# Patient Record
Sex: MALE | Race: BLACK OR AFRICAN AMERICAN | NOT HISPANIC OR LATINO | Employment: FULL TIME | URBAN - METROPOLITAN AREA
[De-identification: names, ages, dates, MRNs, and addresses within clinical notes are randomized per-mention and may not be internally consistent; named-entity substitution may affect disease eponyms.]

---

## 2017-03-13 ENCOUNTER — ALLSCRIPTS OFFICE VISIT (OUTPATIENT)
Dept: OTHER | Facility: OTHER | Age: 52
End: 2017-03-13

## 2017-05-22 ENCOUNTER — ALLSCRIPTS OFFICE VISIT (OUTPATIENT)
Dept: OTHER | Facility: OTHER | Age: 52
End: 2017-05-22

## 2017-05-23 ENCOUNTER — LAB CONVERSION - ENCOUNTER (OUTPATIENT)
Dept: OTHER | Facility: OTHER | Age: 52
End: 2017-05-23

## 2017-05-23 ENCOUNTER — GENERIC CONVERSION - ENCOUNTER (OUTPATIENT)
Dept: OTHER | Facility: OTHER | Age: 52
End: 2017-05-23

## 2017-05-23 LAB
A/G RATIO (HISTORICAL): 1.2 (CALC) (ref 1–2.5)
ALBUMIN SERPL BCP-MCNC: 4.1 G/DL (ref 3.6–5.1)
ALP SERPL-CCNC: 108 U/L (ref 40–115)
ALT SERPL W P-5'-P-CCNC: 23 U/L (ref 9–46)
AST SERPL W P-5'-P-CCNC: 20 U/L (ref 10–35)
BILIRUB SERPL-MCNC: 0.4 MG/DL (ref 0.2–1.2)
BUN SERPL-MCNC: 10 MG/DL (ref 7–25)
BUN/CREA RATIO (HISTORICAL): ABNORMAL (CALC) (ref 6–22)
CALCIUM SERPL-MCNC: 9.4 MG/DL (ref 8.6–10.3)
CHLORIDE SERPL-SCNC: 102 MMOL/L (ref 98–110)
CHOLEST SERPL-MCNC: 206 MG/DL (ref 125–200)
CHOLEST/HDLC SERPL: 5 (CALC)
CO2 SERPL-SCNC: 24 MMOL/L (ref 20–31)
CREAT SERPL-MCNC: 0.91 MG/DL (ref 0.7–1.33)
EGFR AFRICAN AMERICAN (HISTORICAL): 111 ML/MIN/1.73M2
EGFR-AMERICAN CALC (HISTORICAL): 96 ML/MIN/1.73M2
GAMMA GLOBULIN (HISTORICAL): 3.3 G/DL (CALC) (ref 1.9–3.7)
GLUCOSE (HISTORICAL): 261 MG/DL (ref 65–99)
HBA1C MFR BLD HPLC: 12.4 % OF TOTAL HGB
HDLC SERPL-MCNC: 41 MG/DL
LDL CHOLESTEROL (HISTORICAL): 124 MG/DL (CALC)
NON-HDL-CHOL (CHOL-HDL) (HISTORICAL): 165 MG/DL (CALC)
POTASSIUM SERPL-SCNC: 4.2 MMOL/L (ref 3.5–5.3)
PROSTATE SPECIFIC ANTIGEN TOTAL (HISTORICAL): 1 NG/ML
SODIUM SERPL-SCNC: 136 MMOL/L (ref 135–146)
TOTAL PROTEIN (HISTORICAL): 7.4 G/DL (ref 6.1–8.1)
TRIGL SERPL-MCNC: 207 MG/DL
TSH SERPL DL<=0.05 MIU/L-ACNC: 1.36 MIU/L (ref 0.4–4.5)

## 2017-06-12 ENCOUNTER — ALLSCRIPTS OFFICE VISIT (OUTPATIENT)
Dept: OTHER | Facility: OTHER | Age: 52
End: 2017-06-12

## 2017-06-13 LAB — MICROALBUM.,U,RANDOM (HISTORICAL): 10.3 UG/ML

## 2017-08-21 ENCOUNTER — ALLSCRIPTS OFFICE VISIT (OUTPATIENT)
Dept: OTHER | Facility: OTHER | Age: 52
End: 2017-08-21

## 2017-08-21 LAB
BILIRUB UR QL STRIP: NEGATIVE
CLARITY UR: NORMAL
COLOR UR: YELLOW
GLUCOSE (HISTORICAL): >1000
HBA1C MFR BLD HPLC: 9.7 %
HGB UR QL STRIP.AUTO: NEGATIVE
KETONES UR STRIP-MCNC: NEGATIVE MG/DL
LEUKOCYTE ESTERASE UR QL STRIP: NEGATIVE
NITRITE UR QL STRIP: NEGATIVE
PH UR STRIP.AUTO: 7 [PH]
PROT UR STRIP-MCNC: NEGATIVE MG/DL
SP GR UR STRIP.AUTO: 1.01
UROBILINOGEN UR QL STRIP.AUTO: NORMAL

## 2017-08-24 ENCOUNTER — LAB CONVERSION - ENCOUNTER (OUTPATIENT)
Dept: OTHER | Facility: OTHER | Age: 52
End: 2017-08-24

## 2017-08-24 LAB
CREATININE, RANDOM URINE (HISTORICAL): 74 MG/DL (ref 20–370)
MAGNESIUM, UR (HISTORICAL): 1.3 MG/DL
MICROALBUMIN/CREATININE RATIO (HISTORICAL): 18 MCG/MG CREAT

## 2017-09-27 ENCOUNTER — GENERIC CONVERSION - ENCOUNTER (OUTPATIENT)
Dept: OTHER | Facility: OTHER | Age: 52
End: 2017-09-27

## 2017-10-12 ENCOUNTER — GENERIC CONVERSION - ENCOUNTER (OUTPATIENT)
Dept: OTHER | Facility: OTHER | Age: 52
End: 2017-10-12

## 2017-11-17 ENCOUNTER — ALLSCRIPTS OFFICE VISIT (OUTPATIENT)
Dept: OTHER | Facility: OTHER | Age: 52
End: 2017-11-17

## 2017-11-18 NOTE — PROGRESS NOTES
Assessment    1  Type 2 diabetes mellitus without complication, without long-term current use of insulin (250 00) (E11 9)   2  Hypertension (401 9) (I10)   3  Mild intermittent asthma without complication (156 47) (L01 78)    Plan  Hypertension    · A diet low in sodium and high in potassium, magnesium, and calcium can help yourblood pressure ; Status:Complete;   Done: [id number]   · Eat a low fat and low cholesterol diet ; Status:Complete;   Done: 40SZB2864   · Call [phone] if: You become dizzy or lightheaded, especially when you stand upafter sitting for a while ; Status:Complete;   Done: 55VWN8956  Type 2 diabetes mellitus without complication, without long-term current use of insulin    · Follow-up visit in 3 months Evaluation and Treatment  Follow-up  Status: Hold For -Scheduling  Requested for: 11BPW1066   · Begin a limited exercise program ; Status:Complete;   Done: 70XRO1609   · Continue with our present treatment plan ; Status:Complete;   Done: 50MKS4081   · Eat a normal well-balanced diet ; Status:Complete;   Done: 79EMN8440   · Have your eyes examined by an eye doctor every year ; Status:Complete;   Done:17Nov2017   · Inspect your feet daily ; Status:Complete;   Done: 24VEX6843   · It is important to take good care of your feet if you have diabetes ; Status:Complete;  Done: 55DWZ1160   · Restrict the salt in your diet by avoiding highly salted foods ; Status:Complete;   Done:17Nov2017   · We recommend that you bring your body mass index down to 26 ; Status:Complete;  Done: 22IES9531   · You need to quit smoking ; Status:Complete;   Done: 92ECF7785   · *VB - Eye Exam; Status:Active; Requested for:17Nov2017;     Discussion/Summary    CONTINUE CURRENT TREATMENT PLANTO MONITOR CARBOHYDRATE INTAKE AND CONTINUE WEIGHT LOSS - BE MORE AGGRESSIVEMETFORMIN TO 1000 MG DAILY3 M  The treatment plan was reviewed with the patient/guardian   The patient/guardian understands and agrees with the treatment plan Chief Complaint  pt present to follow up on DM  af/rma      History of Present Illness  PATIENT RETURNS FOR RE EVALUATION OF MEDICAL ISSUES  INTAKEINCREASED ACTIVITYA LOT WITH GENA NUMBNESS, TINGLING OR WEAKNESS  [address] [facility]  USE OF INHALER      Review of Systems   Constitutional: no fever,-- no chills-- and-- not feeling tired  Eyes: no eyesight problems  ENT: no nasal discharge  Cardiovascular: no chest pain-- and-- no extremity edema  Respiratory: no shortness of breath,-- no cough,-- no wheezing-- and-- no shortness of breath during exertion  Gastrointestinal: no abdominal pain,-- no nausea,-- no vomiting-- and-- no diarrhea  Genitourinary: no dysuria  Musculoskeletal: no arthralgias-- and-- no joint stiffness  Integumentary: no rashes  Neurological: no headache,-- no numbness,-- no tingling-- and-- no dizziness  Psychiatric: no anxiety  Endocrine: no muscle weakness  Hematologic/Lymphatic: no swollen glands  ROS reviewed  Active Problems    1  Hypertension (401 9) (I10)   2  Mild intermittent asthma without complication (620 85) (O16 61)   3  Trigger ring finger of right hand (727 03) (M65 341)   4  Type 2 diabetes mellitus without complication, without long-term current use of insulin (250 00) (E11 9)    Past Medical History  1  Flu vaccine need (V04 81) (Z23)    The active problems and past medical history were reviewed and updated today  Surgical History    The surgical history was reviewed and updated today  Family History  Mother    1  Family history of malignant neoplasm of breast (V16 3) (Z80 3)    The family history was reviewed and updated today  Social History     · Current some day smoker (305 1) (F17 200)   · Former smoker (V15 82) (B34 543)   · No alcohol use  The social history was reviewed and updated today  Current Meds   1   Albuterol 90 MCG/ACT AERS; FOR RESCUE WHEN OTHER MEDS NOT WORKING,USE 2-3 PUFFS 3-4 TIMES DAILY MAX EVERY 4 HOURS; Therapy: (Recorded:12Jun2017) to Recorded   2  AmLODIPine Besylate 10 MG Oral Tablet; TAKE 1 TABLET DAILY AS DIRECTED  Requested for: 47YXW9489; Last Rx:09Nov2017 Ordered   3  Asmanex  MCG/ACT Inhalation Aerosol; inhale one puff in the morning and one puff at night; Therapy: (Recorded:12Jun2017) to Recorded   4  Farxiga 10 MG Oral Tablet; Take 1 tablet daily; Therapy: 62FLJ6960 to (Last Rx:12Jun2017) Ordered   5  MetFORMIN HCl  MG Oral Tablet Extended Release 24 Hour; Therapy: (Recorded:13Mar2017) to Recorded   6  Vitamin D-3 5000 UNIT Oral Tablet; Therapy: (Recorded:13Mar2017) to Recorded    The medication list was reviewed and updated today  Allergies    1  No Known Drug Allergies    Physical Exam   Constitutional  General appearance: No acute distress, well appearing and well nourished  Eyes  Conjunctiva and lids: No swelling, erythema, or discharge  Pupils and irises: Equal, round and reactive to light  Ears, Nose, Mouth, and Throat  External inspection of ears and nose: Normal    Pulmonary  Respiratory effort: No increased work of breathing or signs of respiratory distress  Auscultation of lungs: Clear to auscultation, equal breath sounds bilaterally, no wheezes, no rales, no rhonci  Cardiovascular  Auscultation of heart: Normal rate and rhythm, normal S1 and S2, without murmurs  Examination of extremities for edema and/or varicosities: Normal    Abdomen  Abdomen: Abnormal  -- MILDLY OBESE  Liver and spleen: No hepatomegaly or splenomegaly  Lymphatic  Palpation of lymph nodes in neck: No lymphadenopathy  Musculoskeletal  Gait and station: Normal    Digits and nails: Normal without clubbing or cyanosis  Inspection/palpation of joints, bones, and muscles: Normal    Skin  Skin and subcutaneous tissue: Normal without rashes or lesions  Neurologic  Reflexes: 2+ and symmetric  Sensation: No sensory loss     Psychiatric  Orientation to person, place and time: Normal  Mood and affect: Normal          Signatures   Electronically signed by : Ashley Benjamin MD; Nov 17 2017  1:09PM EST                       (Author)

## 2017-12-08 ENCOUNTER — GENERIC CONVERSION - ENCOUNTER (OUTPATIENT)
Dept: OTHER | Facility: OTHER | Age: 52
End: 2017-12-08

## 2018-01-13 VITALS
HEART RATE: 82 BPM | WEIGHT: 222 LBS | TEMPERATURE: 98.6 F | RESPIRATION RATE: 20 BRPM | DIASTOLIC BLOOD PRESSURE: 80 MMHG | OXYGEN SATURATION: 95 % | SYSTOLIC BLOOD PRESSURE: 120 MMHG | BODY MASS INDEX: 34.84 KG/M2 | HEIGHT: 67 IN

## 2018-01-13 VITALS
TEMPERATURE: 97.3 F | BODY MASS INDEX: 34.37 KG/M2 | RESPIRATION RATE: 18 BRPM | HEIGHT: 67 IN | HEART RATE: 80 BPM | WEIGHT: 219 LBS | DIASTOLIC BLOOD PRESSURE: 82 MMHG | SYSTOLIC BLOOD PRESSURE: 124 MMHG

## 2018-01-13 VITALS
TEMPERATURE: 98.8 F | WEIGHT: 220 LBS | HEIGHT: 67 IN | HEART RATE: 82 BPM | OXYGEN SATURATION: 96 % | DIASTOLIC BLOOD PRESSURE: 70 MMHG | RESPIRATION RATE: 20 BRPM | SYSTOLIC BLOOD PRESSURE: 124 MMHG | BODY MASS INDEX: 34.53 KG/M2

## 2018-01-14 VITALS
DIASTOLIC BLOOD PRESSURE: 64 MMHG | OXYGEN SATURATION: 98 % | RESPIRATION RATE: 20 BRPM | HEART RATE: 85 BPM | TEMPERATURE: 99 F | SYSTOLIC BLOOD PRESSURE: 128 MMHG

## 2018-01-14 VITALS
TEMPERATURE: 98.1 F | OXYGEN SATURATION: 97 % | SYSTOLIC BLOOD PRESSURE: 132 MMHG | DIASTOLIC BLOOD PRESSURE: 90 MMHG | WEIGHT: 223 LBS | HEART RATE: 80 BPM | BODY MASS INDEX: 35 KG/M2 | HEIGHT: 67 IN

## 2018-01-15 NOTE — RESULT NOTES
Verified Results  (1) COMPREHENSIVE METABOLIC PANEL [id number] 95:28YX Roxine Oscar     Test Name Result Flag Reference   GLUCOSE 261 mg/dL H 65-99   Fasting reference interval     For someone without known diabetes, a glucose  value >125 mg/dL indicates that they may have  diabetes and this should be confirmed with a  follow-up test    UREA NITROGEN (BUN) 10 mg/dL  7-25   CREATININE 0 91 mg/dL  0 70-1 33   For patients >52years of age, the reference limit  for Creatinine is approximately 13% higher for people  identified as -American  eGFR NON-AFR  AMERICAN 96 mL/min/1 73m2  > OR = 60   eGFR AFRICAN AMERICAN 111 mL/min/1 73m2  > OR = 60   BUN/CREATININE RATIO   0-40   NOT APPLICABLE (calc)   SODIUM 136 mmol/L  135-146   POTASSIUM 4 2 mmol/L  3 5-5 3   CHLORIDE 102 mmol/L     CARBON DIOXIDE 24 mmol/L  20-31   CALCIUM 9 4 mg/dL  8 6-10 3   PROTEIN, TOTAL 7 4 g/dL  6 1-8 1   ALBUMIN 4 1 g/dL  3 6-5 1   GLOBULIN 3 3 g/dL (calc)  1 9-3 7   ALBUMIN/GLOBULIN RATIO 1 2 (calc)  1 0-2 5   BILIRUBIN, TOTAL 0 4 mg/dL  0 2-1 2   ALKALINE PHOSPHATASE 108 U/L     AST 20 U/L  10-35   ALT 23 U/L  9-46     (1) LIPID PANEL, FASTING [id number] 12:00AM Roxine Oscar     Test Name Result Flag Reference   CHOLESTEROL, TOTAL 206 mg/dL H 125-200   HDL CHOLESTEROL 41 mg/dL  > OR = 40   TRIGLICERIDES 755 mg/dL H <150   LDL-CHOLESTEROL 124 mg/dL (calc)  <130   Desirable range <100 mg/dL for patients with CHD or  diabetes and <70 mg/dL for diabetic patients with  known heart disease  CHOL/HDLC RATIO 5 0 (calc)  < OR = 5 0   NON HDL CHOLESTEROL 165 mg/dL (calc) H    Target for non-HDL cholesterol is 30 mg/dL higher than   LDL cholesterol target  (1) PSA (SCREEN) (Dx V76 44 Screen for Prostate Cancer) [id number] 12:00AM Roxine Oscar     Test Name Result Flag Reference   PSA, TOTAL 1 0 ng/mL  < OR = 4 0   This test was performed using the Siemens  chemiluminescent method   Values obtained from  different assay methods cannot be used  interchangeably  PSA levels, regardless of  value, should not be interpreted as absolute  evidence of the presence or absence of disease  (Q) CBC (H/H, RBC, INDICES, WBC, PLT) [id number] 12:00AM [employer]     Test Name Result Flag Reference   WHITE BLOOD CELL COUNT 5 1 Thousand/uL  3 8-10 8   RED BLOOD CELL COUNT 6 21 Million/uL H 4 20-5 80   HEMOGLOBIN 15 0 g/dL  13 2-17 1   HEMATOCRIT 46 6 %  38 5-50 0   MCV 75 1 fL L 80 0-100 0   MCH 24 1 pg L 27 0-33 0   MCHC 32 1 g/dL  32 0-36 0   RDW 18 4 % H 11 0-15 0   PLATELET COUNT 655 Thousand/uL  140-400   WE RECEIVED YOUR HANDWRITTEN TEST ORDER AND  PERFORMED A HEMOGRAM WITH A PLATELET WITHOUT  A DIFFERENTIAL  IF THIS IS NOT WHAT YOU INTENDED  TO ORDER, PLEASE CONTACT YOUR LOCAL CLIENT SERVICE  REPRESENTATIVE IMMEDIATELY SO THAT WE CAN   ADJUST OUR BILLING APPROPRIATELY  YOU MAY ALSO   INQUIRE ABOUT ALTERNATIVE OR ADDITIONAL TESTING  MPV 8 7 fL  7 5-12 5           (Q) TSH, 3RD GENERATION [id number] 12:00AM [facility] [facility]     Test Name Result Flag Reference   TSH 1 36 mIU/L  0 40-4 50     (Q) HEMOGLOBIN A1c [id number] 12:00AM [facility] [facility]     Test Name Result Flag Reference   HEMOGLOBIN A1c 12 4 % of total Hgb H <5 7   For someone without known diabetes, a hemoglobin A1c  value of 6 5% or greater indicates that they may have   diabetes and this should be confirmed with a follow-up   test      For someone with known diabetes, a value <7% indicates   that their diabetes is well controlled and a value   greater than or equal to 7% indicates suboptimal   control  A1c targets should be individualized based on   duration of diabetes, age, comorbid conditions, and   other considerations  Currently, no consensus exists regarding use of  hemoglobin A1c for diagnosis of diabetes for children

## 2018-01-22 VITALS
SYSTOLIC BLOOD PRESSURE: 128 MMHG | OXYGEN SATURATION: 96 % | BODY MASS INDEX: 35 KG/M2 | RESPIRATION RATE: 16 BRPM | DIASTOLIC BLOOD PRESSURE: 80 MMHG | TEMPERATURE: 97.9 F | WEIGHT: 223 LBS | HEART RATE: 78 BPM | HEIGHT: 67 IN

## 2018-01-24 VITALS
HEART RATE: 88 BPM | SYSTOLIC BLOOD PRESSURE: 130 MMHG | RESPIRATION RATE: 20 BRPM | BODY MASS INDEX: 35 KG/M2 | WEIGHT: 223 LBS | DIASTOLIC BLOOD PRESSURE: 100 MMHG | OXYGEN SATURATION: 93 % | HEIGHT: 67 IN | TEMPERATURE: 99 F

## 2018-02-23 ENCOUNTER — OFFICE VISIT (OUTPATIENT)
Dept: FAMILY MEDICINE CLINIC | Facility: CLINIC | Age: 53
End: 2018-02-23
Payer: COMMERCIAL

## 2018-02-23 VITALS
BODY MASS INDEX: 34.25 KG/M2 | RESPIRATION RATE: 18 BRPM | WEIGHT: 218.2 LBS | HEIGHT: 67 IN | DIASTOLIC BLOOD PRESSURE: 98 MMHG | TEMPERATURE: 98.5 F | SYSTOLIC BLOOD PRESSURE: 148 MMHG | OXYGEN SATURATION: 95 % | HEART RATE: 97 BPM

## 2018-02-23 DIAGNOSIS — R13.10 DYSPHAGIA, UNSPECIFIED TYPE: ICD-10-CM

## 2018-02-23 DIAGNOSIS — J06.9 UPPER RESPIRATORY TRACT INFECTION, UNSPECIFIED TYPE: Primary | ICD-10-CM

## 2018-02-23 DIAGNOSIS — J02.9 SORE THROAT: ICD-10-CM

## 2018-02-23 PROBLEM — M65.341 TRIGGER RING FINGER OF RIGHT HAND: Status: ACTIVE | Noted: 2017-09-27

## 2018-02-23 PROBLEM — E11.9 TYPE 2 DIABETES MELLITUS WITHOUT COMPLICATION, WITHOUT LONG-TERM CURRENT USE OF INSULIN (HCC): Status: ACTIVE | Noted: 2017-03-13

## 2018-02-23 PROBLEM — J45.20 MILD INTERMITTENT ASTHMA WITHOUT COMPLICATION: Status: ACTIVE | Noted: 2017-03-13

## 2018-02-23 PROBLEM — I10 HYPERTENSION: Status: ACTIVE | Noted: 2017-03-13

## 2018-02-23 LAB — S PYO AG THROAT QL: NEGATIVE

## 2018-02-23 PROCEDURE — 99213 OFFICE O/P EST LOW 20 MIN: CPT | Performed by: NURSE PRACTITIONER

## 2018-02-23 PROCEDURE — 87880 STREP A ASSAY W/OPTIC: CPT | Performed by: NURSE PRACTITIONER

## 2018-02-23 RX ORDER — PANTOPRAZOLE SODIUM 40 MG/1
1 TABLET, DELAYED RELEASE ORAL DAILY
COMMUNITY
Start: 2018-01-03

## 2018-02-23 RX ORDER — ALBUTEROL SULFATE 90 UG/1
3 AEROSOL, METERED RESPIRATORY (INHALATION) 4 TIMES DAILY
COMMUNITY
End: 2019-03-30 | Stop reason: SDUPTHER

## 2018-02-23 RX ORDER — AMLODIPINE BESYLATE 10 MG/1
1 TABLET ORAL DAILY
COMMUNITY
End: 2018-11-19 | Stop reason: SDUPTHER

## 2018-02-23 RX ORDER — METFORMIN HYDROCHLORIDE 500 MG/1
1 TABLET, EXTENDED RELEASE ORAL 2 TIMES DAILY
COMMUNITY
End: 2018-05-15 | Stop reason: SDUPTHER

## 2018-02-23 NOTE — PATIENT INSTRUCTIONS
Fluids  Rest  Symptomatic care for sore throat symptoms   Return to office in one week if symptoms persist or worsen

## 2018-02-23 NOTE — PROGRESS NOTES
Assessment/Plan:    Upper respiratory tract infection  - pt states his daughter was diagnosed with strep throat   - symptoms began this morning      Diagnoses and all orders for this visit:    Upper respiratory tract infection, unspecified type  -     POCT rapid strepA - NEGATIVE  -  REST, FLUIDS AND SYMPTOMATIC CARE    Dysphagia, unspecified type    Other orders  -     albuterol (PROVENTIL HFA,VENTOLIN HFA) 90 mcg/act inhaler; Inhale 3 puffs 4 times daily  -     amLODIPine (NORVASC) 10 mg tablet; Take 1 tablet by mouth daily  -     Mometasone Furoate (ASMANEX HFA) 100 MCG/ACT AERO; Inhale 1 puff 2 (two) times a day  -     Dapagliflozin Propanediol (FARXIGA) 10 MG TABS; Take 1 tablet by mouth daily  -     metFORMIN (GLUCOPHAGE-XR) 500 mg 24 hr tablet; Take 1 tablet by mouth 2 (two) times a day  -     Cholecalciferol (VITAMIN D-3) 5000 units TABS; Take 1 tablet by mouth daily  -     pantoprazole (PROTONIX) 40 mg tablet; Take 1 tablet by mouth daily    Subjective:      Patient ID: Mitul Lane is a 46 y o  male  Sore Throat    This is a new problem  The current episode started today  The problem has been unchanged  There has been no fever  The pain is mild  Pertinent negatives include no abdominal pain, congestion, coughing, diarrhea, ear discharge, ear pain, headaches, shortness of breath or vomiting  He has had exposure to strep  The following portions of the patient's history were reviewed and updated as appropriate: allergies, current medications, past family history, past medical history, past social history, past surgical history and problem list     Review of Systems   Constitutional: Negative for fatigue and fever  HENT: Positive for sore throat  Negative for congestion, ear discharge, ear pain, postnasal drip, rhinorrhea and sinus pressure  Eyes: Negative for discharge  Respiratory: Negative for cough, chest tightness, shortness of breath and wheezing      Cardiovascular: Negative for chest pain  Gastrointestinal: Negative for abdominal pain, diarrhea, nausea and vomiting  Genitourinary: Negative for dysuria  Skin: Negative for rash  Neurological: Negative for headaches  Objective:    /98 (BP Location: Left arm, Patient Position: Sitting, Cuff Size: Standard)   Pulse 97   Temp 98 5 °F (36 9 °C) (Temporal)   Resp 18   Ht 5' 7" (1 702 m)   Wt 99 kg (218 lb 3 2 oz)   SpO2 95%   BMI 34 17 kg/m²      Physical Exam   Constitutional: He is oriented to person, place, and time  He appears well-developed and well-nourished  HENT:   Head: Normocephalic and atraumatic  Right Ear: Hearing normal  No swelling or tenderness  No middle ear effusion  Left Ear: Hearing normal  No swelling or tenderness  No middle ear effusion  Nose: No mucosal edema or rhinorrhea  Mouth/Throat: Posterior oropharyngeal erythema present  No oropharyngeal exudate or posterior oropharyngeal edema  Eyes: Conjunctivae are normal  Pupils are equal, round, and reactive to light  Neck: Normal range of motion  Neck supple  No thyromegaly present  Cardiovascular: Normal rate, regular rhythm and normal heart sounds  Pulmonary/Chest: Effort normal and breath sounds normal  No respiratory distress  He has no wheezes  Abdominal: Soft  Bowel sounds are normal  He exhibits no distension  There is no tenderness  There is no rebound  Lymphadenopathy:     He has no cervical adenopathy  Neurological: He is alert and oriented to person, place, and time  Skin: Skin is warm and dry  No rash noted  Psychiatric: He has a normal mood and affect   His behavior is normal  Thought content normal

## 2018-03-15 ENCOUNTER — OFFICE VISIT (OUTPATIENT)
Dept: FAMILY MEDICINE CLINIC | Facility: CLINIC | Age: 53
End: 2018-03-15
Payer: COMMERCIAL

## 2018-03-15 VITALS
HEIGHT: 67 IN | DIASTOLIC BLOOD PRESSURE: 70 MMHG | SYSTOLIC BLOOD PRESSURE: 122 MMHG | WEIGHT: 216 LBS | BODY MASS INDEX: 33.9 KG/M2 | HEART RATE: 94 BPM | TEMPERATURE: 98.6 F | RESPIRATION RATE: 16 BRPM | OXYGEN SATURATION: 94 %

## 2018-03-15 DIAGNOSIS — I10 ESSENTIAL HYPERTENSION: ICD-10-CM

## 2018-03-15 DIAGNOSIS — J45.20 MILD INTERMITTENT ASTHMA WITHOUT COMPLICATION: ICD-10-CM

## 2018-03-15 DIAGNOSIS — E11.9 TYPE 2 DIABETES MELLITUS WITHOUT COMPLICATION, WITHOUT LONG-TERM CURRENT USE OF INSULIN (HCC): Primary | ICD-10-CM

## 2018-03-15 PROBLEM — J06.9 UPPER RESPIRATORY TRACT INFECTION: Status: RESOLVED | Noted: 2018-02-23 | Resolved: 2018-03-15

## 2018-03-15 LAB
SL AMB  POCT GLUCOSE, UA: >1000
SL AMB LEUKOCYTE ESTERASE,UA: 0
SL AMB POCT BILIRUBIN,UA: 0
SL AMB POCT BLOOD,UA: 0
SL AMB POCT CLARITY,UA: CLEAR
SL AMB POCT COLOR,UA: YELLOW
SL AMB POCT HEMOGLOBIN AIC: 11.9
SL AMB POCT KETONES,UA: 0
SL AMB POCT NITRITE,UA: 0
SL AMB POCT PH,UA: 5
SL AMB POCT SPECIFIC GRAVITY,UA: 1.01
SL AMB POCT URINE PROTEIN: 0
SL AMB POCT UROBILINOGEN: ABNORMAL

## 2018-03-15 PROCEDURE — 83036 HEMOGLOBIN GLYCOSYLATED A1C: CPT | Performed by: FAMILY MEDICINE

## 2018-03-15 PROCEDURE — 99214 OFFICE O/P EST MOD 30 MIN: CPT | Performed by: FAMILY MEDICINE

## 2018-03-15 PROCEDURE — 81003 URINALYSIS AUTO W/O SCOPE: CPT | Performed by: FAMILY MEDICINE

## 2018-03-15 NOTE — PROGRESS NOTES
Patient's shoes and socks removed  Right Foot/Ankle   Right Foot Inspection  Skin Exam: skin normal and skin intact no dry skin, no warmth, no callus, no erythema, no maceration, no abnormal color, no pre-ulcer, no ulcer and no callus                          Toe Exam: ROM and strength within normal limits  Sensory       Monofilament testing: intact  Vascular    The right DP pulse is 2+  The right PT pulse is 2+  Left Foot/Ankle  Left Foot Inspection  Skin Exam: skin normal and skin intactno dry skin, no warmth, no erythema, no maceration, normal color, no pre-ulcer, no ulcer and no callus                         Toe Exam: ROM and strength within normal limits                   Sensory       Monofilament: intact  Vascular    The left DP pulse is 2+  The left PT pulse is 2+  Assign Risk Category:  No deformity present; No loss of protective sensation; No weak pulses       Risk: 0  Assessment/Plan:    Type 2 diabetes mellitus without complication, without long-term current use of insulin (HCC)  DOING WELL  NO CONCERNS   COMPLIANT WITH MEDICATIONS  FOOT CARE    Mild intermittent asthma without complication  STABLE  NO WHEEZING   OCC COUGH      Hypertension  STABLE  DENIES ANY Cp, SOB, PALPITATIONS  COMPLIANT WITH MEDICATIONS  NO SIGNIFICANT EDEMA       Diagnoses and all orders for this visit:    Type 2 diabetes mellitus without complication, without long-term current use of insulin (HCC)  -     POCT hemoglobin A1c  -     POCT urine dip auto non-scope  -     Microalbumin, Random Urine (W/Creatinine)    Essential hypertension  -     POCT urine dip auto non-scope    Mild intermittent asthma without complication          Subjective:      Patient ID: Arabella Rios is a 46 y o  male      HPI    The following portions of the patient's history were reviewed and updated as appropriate: allergies, current medications, past family history, past medical history, past social history, past surgical history and problem list     Review of Systems   Constitutional: Negative for chills, fatigue and fever  HENT: Negative for congestion, ear discharge, ear pain, mouth sores, postnasal drip, sore throat and trouble swallowing  Eyes: Negative for pain, discharge and visual disturbance  Respiratory: Negative for cough, shortness of breath and wheezing  Cardiovascular: Negative for chest pain, palpitations and leg swelling  Gastrointestinal: Negative for abdominal distention, abdominal pain, blood in stool, diarrhea and nausea  Endocrine: Negative for polydipsia, polyphagia and polyuria  Genitourinary: Negative for dysuria, frequency, hematuria and urgency  Musculoskeletal: Negative for arthralgias, gait problem and joint swelling  Skin: Negative for pallor and rash  Neurological: Negative for dizziness, syncope, speech difficulty, weakness, light-headedness, numbness and headaches  Hematological: Negative for adenopathy  Psychiatric/Behavioral: Negative for behavioral problems, confusion and sleep disturbance  The patient is not nervous/anxious  Objective:      /70 (BP Location: Left arm, Patient Position: Sitting, Cuff Size: Standard)   Pulse 94   Temp 98 6 °F (37 °C) (Temporal)   Resp 16   Ht 5' 7" (1 702 m)   Wt 98 kg (216 lb)   SpO2 94%   BMI 33 83 kg/m²          Physical Exam   Constitutional: He is oriented to person, place, and time  He appears well-developed and well-nourished  HENT:   Head: Normocephalic and atraumatic  Eyes: Conjunctivae and EOM are normal  Pupils are equal, round, and reactive to light  Right eye exhibits no discharge  Left eye exhibits no discharge  Neck: Neck supple  No JVD present  No thyromegaly present  Cardiovascular: Normal rate, regular rhythm and normal heart sounds  Pulses are no weak pulses  No murmur heard  Pulses:       Dorsalis pedis pulses are 2+ on the right side, and 2+ on the left side          Posterior tibial pulses are 2+ on the right side, and 2+ on the left side  Pulmonary/Chest: Effort normal and breath sounds normal  He has no wheezes  He has no rales  Abdominal: Soft  Bowel sounds are normal  He exhibits no mass  There is no hepatosplenomegaly  There is no tenderness  There is no rebound, no guarding and no CVA tenderness  MILDLY OBESE   Musculoskeletal: Normal range of motion  He exhibits no edema, tenderness or deformity  Feet:   Right Foot:   Skin Integrity: Negative for ulcer, skin breakdown, erythema, warmth, callus or dry skin  Left Foot:   Skin Integrity: Negative for ulcer, skin breakdown, erythema, warmth, callus or dry skin  Lymphadenopathy:     He has no cervical adenopathy  He has no axillary adenopathy  Neurological: He is alert and oriented to person, place, and time  Skin: Skin is warm and dry  No rash noted  No erythema  Psychiatric: He has a normal mood and affect   His behavior is normal  Judgment and thought content normal

## 2018-03-15 NOTE — PATIENT INSTRUCTIONS
CONTINUE CURRENT TREATMENT PLAN  MONITOR CARBOHYDRATE AND SODIUM INTAKE  MEDICATION AS DIRECTED  FOOT CARE  RV 3 M

## 2018-03-19 LAB
ALBUMIN/CREAT UR: 55 MCG/MG CREAT
CREAT UR-MCNC: 40 MG/DL (ref 20–370)
MICROALBUMIN UR-MCNC: 2.2 MG/DL

## 2018-05-15 DIAGNOSIS — E11.9 TYPE 2 DIABETES MELLITUS WITHOUT COMPLICATION, WITHOUT LONG-TERM CURRENT USE OF INSULIN (HCC): Primary | ICD-10-CM

## 2018-05-15 RX ORDER — METFORMIN HYDROCHLORIDE 500 MG/1
500 TABLET, EXTENDED RELEASE ORAL 2 TIMES DAILY
Qty: 180 TABLET | Refills: 3 | Status: SHIPPED | OUTPATIENT
Start: 2018-05-15 | End: 2019-11-15 | Stop reason: HOSPADM

## 2018-06-08 DIAGNOSIS — E11.9 TYPE 2 DIABETES MELLITUS WITHOUT COMPLICATION, WITHOUT LONG-TERM CURRENT USE OF INSULIN (HCC): Primary | ICD-10-CM

## 2018-06-08 RX ORDER — DAPAGLIFLOZIN 10 MG/1
TABLET, FILM COATED ORAL
Qty: 90 TABLET | Refills: 3 | Status: SHIPPED | OUTPATIENT
Start: 2018-06-08 | End: 2019-06-07 | Stop reason: SDUPTHER

## 2018-06-14 ENCOUNTER — OFFICE VISIT (OUTPATIENT)
Dept: FAMILY MEDICINE CLINIC | Facility: CLINIC | Age: 53
End: 2018-06-14
Payer: COMMERCIAL

## 2018-06-14 VITALS
WEIGHT: 213 LBS | RESPIRATION RATE: 18 BRPM | HEIGHT: 67 IN | DIASTOLIC BLOOD PRESSURE: 92 MMHG | OXYGEN SATURATION: 98 % | TEMPERATURE: 98.2 F | BODY MASS INDEX: 33.43 KG/M2 | SYSTOLIC BLOOD PRESSURE: 140 MMHG | HEART RATE: 105 BPM

## 2018-06-14 DIAGNOSIS — I10 ESSENTIAL HYPERTENSION: ICD-10-CM

## 2018-06-14 DIAGNOSIS — E11.9 TYPE 2 DIABETES MELLITUS WITHOUT COMPLICATION, WITHOUT LONG-TERM CURRENT USE OF INSULIN (HCC): Primary | ICD-10-CM

## 2018-06-14 DIAGNOSIS — J45.20 MILD INTERMITTENT ASTHMA WITHOUT COMPLICATION: ICD-10-CM

## 2018-06-14 LAB — SL AMB POCT HEMOGLOBIN AIC: 13.5

## 2018-06-14 PROCEDURE — 99214 OFFICE O/P EST MOD 30 MIN: CPT | Performed by: FAMILY MEDICINE

## 2018-06-14 PROCEDURE — 83036 HEMOGLOBIN GLYCOSYLATED A1C: CPT | Performed by: FAMILY MEDICINE

## 2018-06-14 RX ORDER — GLIPIZIDE 10 MG/1
10 TABLET, FILM COATED, EXTENDED RELEASE ORAL DAILY
Qty: 30 TABLET | Refills: 1 | Status: SHIPPED | OUTPATIENT
Start: 2018-06-14 | End: 2018-08-10 | Stop reason: SDUPTHER

## 2018-06-14 NOTE — ASSESSMENT & PLAN NOTE
DOING WELL  NO CONCERNS   COULD NOT TOLERATE BID METFORMIN  FOOT CARE    REVIEWED DIET AND MEDICATIONS

## 2018-06-14 NOTE — PROGRESS NOTES
Assessment/Plan:    Problem List Items Addressed This Visit     Hypertension    Mild intermittent asthma without complication    Type 2 diabetes mellitus without complication, without long-term current use of insulin ([facility] [address] ) - Primary     Lab Results   Component Value Date    HGBA1C 11 9 03/15/2018       No results for input(s): POCGLU in the last 72 hours  Blood Sugar Average: Last 72 hrs:           Relevant Orders    POCT hemoglobin A1c          Patient Instructions   CONTINUE CURRENT TREATMENT PLAN  MONITOR CARBOHYDRATE AND SODIUM INTAKE  MEDICATION AS DIRECTED  FOOT CARE  RV 3 M      Return in about 3 months (around 9/14/2018) for Recheck  Subjective:      Patient ID: Gabriele Haley is a 46 y o  male  No chief complaint on file  HPI    The following portions of the patient's history were reviewed and updated as appropriate: allergies, current medications, past family history, past medical history, past social history, past surgical history and problem list     Review of Systems   Constitutional: Negative for chills, fatigue and fever  HENT: Negative for congestion, ear discharge, ear pain, mouth sores, postnasal drip, sore throat and trouble swallowing  Eyes: Negative for pain, discharge and visual disturbance  Respiratory: Negative for cough, shortness of breath and wheezing  Cardiovascular: Negative for chest pain, palpitations and leg swelling  Gastrointestinal: Negative for abdominal distention, abdominal pain, blood in stool, diarrhea and nausea  Endocrine: Negative for polydipsia, polyphagia and polyuria  Genitourinary: Negative for dysuria, frequency, hematuria and urgency  Musculoskeletal: Negative for arthralgias, gait problem and joint swelling  Skin: Negative for pallor and rash  Neurological: Negative for dizziness, syncope, speech difficulty, weakness, light-headedness, numbness and headaches  Hematological: Negative for adenopathy     Psychiatric/Behavioral: Negative for behavioral problems, confusion and sleep disturbance  The patient is not nervous/anxious  Current Outpatient Prescriptions   Medication Sig Dispense Refill    albuterol (PROVENTIL HFA,VENTOLIN HFA) 90 mcg/act inhaler Inhale 3 puffs 4 times daily      amLODIPine (NORVASC) 10 mg tablet Take 1 tablet by mouth daily      Cholecalciferol (VITAMIN D-3) 5000 units TABS Take 1 tablet by mouth daily      FARXIGA 10 MG TABS TAKE 1 TABLET BY MOUTH EVERY DAY 90 tablet 3    metFORMIN (GLUCOPHAGE-XR) 500 mg 24 hr tablet Take 1 tablet (500 mg total) by mouth 2 (two) times a day 180 tablet 3    Mometasone Furoate (ASMANEX HFA) 100 MCG/ACT AERO Inhale 1 puff 2 (two) times a day      pantoprazole (PROTONIX) 40 mg tablet Take 1 tablet by mouth daily       No current facility-administered medications for this visit  Objective: There were no vitals taken for this visit  Physical Exam   Constitutional: He is oriented to person, place, and time  He appears well-developed and well-nourished  HENT:   Head: Normocephalic and atraumatic  Eyes: Conjunctivae and EOM are normal  Pupils are equal, round, and reactive to light  Right eye exhibits no discharge  Left eye exhibits no discharge  Neck: Neck supple  No JVD present  No thyromegaly present  Cardiovascular: Normal rate, regular rhythm and normal heart sounds  No murmur heard  Pulmonary/Chest: Effort normal and breath sounds normal  He has no wheezes  He has no rales  Abdominal: Soft  Bowel sounds are normal  He exhibits no mass  There is no hepatosplenomegaly  There is no tenderness  There is no rebound, no guarding and no CVA tenderness  Musculoskeletal: Normal range of motion  He exhibits no edema, tenderness or deformity  Lymphadenopathy:     He has no cervical adenopathy  He has no axillary adenopathy  Neurological: He is alert and oriented to person, place, and time  Skin: Skin is warm and dry  No rash noted   No erythema  Psychiatric: He has a normal mood and affect   His behavior is normal  Judgment and thought content normal               Wilberto Phan MD

## 2018-06-14 NOTE — PATIENT INSTRUCTIONS
CONTINUE CURRENT TREATMENT PLAN  MONITOR CARBOHYDRATE AND SODIUM INTAKE  ADD GLIPIZIDE  IF HGB A1C CONTINUES TO BE ELEVATED  MEDICATION AS DIRECTED  FOOT CARE  RV 3 M

## 2018-06-20 ENCOUNTER — TELEPHONE (OUTPATIENT)
Dept: FAMILY MEDICINE CLINIC | Facility: CLINIC | Age: 53
End: 2018-06-20

## 2018-07-26 ENCOUNTER — OFFICE VISIT (OUTPATIENT)
Dept: FAMILY MEDICINE CLINIC | Facility: CLINIC | Age: 53
End: 2018-07-26
Payer: COMMERCIAL

## 2018-07-26 VITALS
OXYGEN SATURATION: 97 % | DIASTOLIC BLOOD PRESSURE: 80 MMHG | HEIGHT: 67 IN | TEMPERATURE: 98.4 F | RESPIRATION RATE: 18 BRPM | SYSTOLIC BLOOD PRESSURE: 110 MMHG | HEART RATE: 86 BPM | BODY MASS INDEX: 34.44 KG/M2 | WEIGHT: 219.4 LBS

## 2018-07-26 DIAGNOSIS — I10 ESSENTIAL HYPERTENSION: Primary | ICD-10-CM

## 2018-07-26 DIAGNOSIS — E11.9 TYPE 2 DIABETES MELLITUS WITHOUT COMPLICATION, WITHOUT LONG-TERM CURRENT USE OF INSULIN (HCC): ICD-10-CM

## 2018-07-26 DIAGNOSIS — J45.20 MILD INTERMITTENT ASTHMA WITHOUT COMPLICATION: ICD-10-CM

## 2018-07-26 LAB
SL AMB  POCT GLUCOSE, UA: >1000
SL AMB LEUKOCYTE ESTERASE,UA: ABNORMAL
SL AMB POCT BILIRUBIN,UA: ABNORMAL
SL AMB POCT BLOOD,UA: ABNORMAL
SL AMB POCT CLARITY,UA: CLEAR
SL AMB POCT COLOR,UA: YELLOW
SL AMB POCT HEMOGLOBIN AIC: 9.8
SL AMB POCT KETONES,UA: ABNORMAL
SL AMB POCT NITRITE,UA: ABNORMAL
SL AMB POCT PH,UA: 6
SL AMB POCT SPECIFIC GRAVITY,UA: 1.01
SL AMB POCT URINE PROTEIN: ABNORMAL
SL AMB POCT UROBILINOGEN: ABNORMAL

## 2018-07-26 PROCEDURE — 99214 OFFICE O/P EST MOD 30 MIN: CPT | Performed by: FAMILY MEDICINE

## 2018-07-26 PROCEDURE — 83036 HEMOGLOBIN GLYCOSYLATED A1C: CPT | Performed by: FAMILY MEDICINE

## 2018-07-26 PROCEDURE — 3074F SYST BP LT 130 MM HG: CPT | Performed by: FAMILY MEDICINE

## 2018-07-26 PROCEDURE — 81003 URINALYSIS AUTO W/O SCOPE: CPT | Performed by: FAMILY MEDICINE

## 2018-07-26 PROCEDURE — 3008F BODY MASS INDEX DOCD: CPT | Performed by: FAMILY MEDICINE

## 2018-07-26 PROCEDURE — 3079F DIAST BP 80-89 MM HG: CPT | Performed by: FAMILY MEDICINE

## 2018-07-26 NOTE — ASSESSMENT & PLAN NOTE
STABLE  DENIES ANY CP, SOB, PALPITATIONS, OR HEADACHE  NOTES NO WATER RETENTION  COMPLIANT WITH MEDICATION  NO CONCERNS    - CONTINUE CURRENT TREATMENT PLAN  - MONITOR DIETARY SODIUM INTAKE  - ENCOURAGE PHYSICAL ACTIVITY  - RV 3 MONTHS

## 2018-07-26 NOTE — PATIENT INSTRUCTIONS
CONTINUE CURRENT TREATMENT PLAN  MONITOR DIETARY SODIUM, CHOL, AND CARBOHYDRATE INTAKE  ENCOURAGE PHYSICAL ACTIVITY  FOOT CARE    RV 3 M, SOONER PRN

## 2018-07-26 NOTE — ASSESSMENT & PLAN NOTE
Lab Results   Component Value Date    HGBA1C 13 5 06/14/2018       No results for input(s): POCGLU in the last 72 hours      Blood Sugar Average: Last 72 hrs:       COMPLIANT WITH MEDICATION  DENIES ANY NUMBNESS, TINGLING IN FEET    - DISCUSSED DIETARY MODIFICATION OF CARBOHYDRATES  - HGB A1C AND URINE STUDIES DUE TODAY  - FOOT CARE  - RV 3 MONTHS

## 2018-07-26 NOTE — PROGRESS NOTES
Assessment/Plan:    Problem List Items Addressed This Visit        Endocrine    Type 2 diabetes mellitus without complication, without long-term current use of insulin ([facility] [address] )     Lab Results   Component Value Date    HGBA1C 13 5 06/14/2018       No results for input(s): POCGLU in the last 72 hours  Blood Sugar Average: Last 72 hrs:       COMPLIANT WITH MEDICATION  DENIES ANY NUMBNESS, TINGLING IN FEET    - DISCUSSED DIETARY MODIFICATION OF CARBOHYDRATES  - HGB A1C AND URINE STUDIES DUE TODAY  - FOOT CARE  - RV 3 MONTHS             Relevant Orders    POCT hemoglobin A1c (Completed)    POCT urine dip auto non-scope (Completed)       Respiratory    Mild intermittent asthma without complication     STABLE  DOING WELL  MINIMAL INHALER USE            Cardiovascular and Mediastinum    Hypertension - Primary     STABLE  DENIES ANY CP, SOB, PALPITATIONS, OR HEADACHE  NOTES NO WATER RETENTION  COMPLIANT WITH MEDICATION  NO CONCERNS    - CONTINUE CURRENT TREATMENT PLAN  - MONITOR DIETARY SODIUM INTAKE  - ENCOURAGE PHYSICAL ACTIVITY  - RV 3 MONTHS                 Patient Instructions   CONTINUE CURRENT TREATMENT PLAN  MONITOR DIETARY SODIUM, CHOL, AND CARBOHYDRATE INTAKE  ENCOURAGE PHYSICAL ACTIVITY  FOOT CARE    RV 3 M, SOONER PRN        Return in about 3 months (around 10/26/2018) for Recheck  Subjective:      Patient ID: Jluis Johnson is a 46 y o  male  Chief Complaint   Patient presents with    Diabetes       PATIENT RETURNS FOR ROUTINE EVALUATION OF PATIENT'S MEDICAL ISSUES    INDIVIDUAL MEDICAL ISSUES WITH THEIR CURRENT STATUS, ASSESSMENT AND PLANS ARE LISTED ABOVE            The following portions of the patient's history were reviewed and updated as appropriate: allergies, current medications, past family history, past medical history, past social history, past surgical history and problem list     Review of Systems   Constitutional: Negative for chills, fatigue and fever     HENT: Negative for congestion, ear discharge, ear pain, mouth sores, postnasal drip, sore throat and trouble swallowing  Eyes: Negative for pain, discharge and visual disturbance  Respiratory: Negative for cough, shortness of breath and wheezing  Cardiovascular: Negative for chest pain, palpitations and leg swelling  Gastrointestinal: Negative for abdominal distention, abdominal pain, blood in stool, diarrhea and nausea  Endocrine: Negative for polydipsia, polyphagia and polyuria  Genitourinary: Negative for dysuria, frequency, hematuria and urgency  Musculoskeletal: Negative for arthralgias, gait problem and joint swelling  Skin: Negative for pallor and rash  Neurological: Negative for dizziness, syncope, speech difficulty, weakness, light-headedness, numbness and headaches  Hematological: Negative for adenopathy  Psychiatric/Behavioral: Negative for behavioral problems, confusion and sleep disturbance  The patient is not nervous/anxious  Current Outpatient Prescriptions   Medication Sig Dispense Refill    albuterol (PROVENTIL HFA,VENTOLIN HFA) 90 mcg/act inhaler Inhale 3 puffs 4 times daily      amLODIPine (NORVASC) 10 mg tablet Take 1 tablet by mouth daily      Cholecalciferol (VITAMIN D-3) 5000 units TABS Take 1 tablet by mouth daily      FARXIGA 10 MG TABS TAKE 1 TABLET BY MOUTH EVERY DAY 90 tablet 3    glipiZIDE (GLUCOTROL XL) 10 mg 24 hr tablet Take 1 tablet (10 mg total) by mouth daily 30 tablet 1    metFORMIN (GLUCOPHAGE-XR) 500 mg 24 hr tablet Take 1 tablet (500 mg total) by mouth 2 (two) times a day 180 tablet 3    Mometasone Furoate (ASMANEX HFA) 100 MCG/ACT AERO Inhale 1 puff 2 (two) times a day      pantoprazole (PROTONIX) 40 mg tablet Take 1 tablet by mouth daily       No current facility-administered medications for this visit          Objective:    /80 (BP Location: Left arm, Patient Position: Sitting, Cuff Size: Extra-Large)   Pulse 86   Temp 98 4 °F (36 9 °C) (Temporal)   Resp 18  5' 7" (1 702 m)   Wt 99 5 kg (219 lb 6 4 oz)   SpO2 97%   BMI 34 36 kg/m²        Physical Exam   Constitutional: He is oriented to person, place, and time  He appears well-developed and well-nourished  HENT:   Head: Normocephalic and atraumatic  Eyes: Conjunctivae and EOM are normal  Pupils are equal, round, and reactive to light  Right eye exhibits no discharge  Left eye exhibits no discharge  Neck: Neck supple  No JVD present  No thyromegaly present  Cardiovascular: Normal rate, regular rhythm and normal heart sounds  No murmur heard  Pulmonary/Chest: Effort normal and breath sounds normal  He has no wheezes  He has no rales  Abdominal: Soft  Bowel sounds are normal  He exhibits no mass  There is no hepatosplenomegaly  There is no tenderness  There is no rebound, no guarding and no CVA tenderness  MILDLY OBESE   Musculoskeletal: Normal range of motion  He exhibits no edema, tenderness or deformity  Lymphadenopathy:     He has no cervical adenopathy  He has no axillary adenopathy  Neurological: He is alert and oriented to person, place, and time  Skin: Skin is warm and dry  No rash noted  No erythema  Psychiatric: He has a normal mood and affect  His behavior is normal  Judgment and thought content normal    Vitals reviewed               Long Becerra MD

## 2018-07-30 ENCOUNTER — TELEPHONE (OUTPATIENT)
Dept: FAMILY MEDICINE CLINIC | Facility: CLINIC | Age: 53
End: 2018-07-30

## 2018-07-30 DIAGNOSIS — M62.830 BACK SPASM: Primary | ICD-10-CM

## 2018-07-30 RX ORDER — CYCLOBENZAPRINE HCL 10 MG
10 TABLET ORAL 3 TIMES DAILY PRN
Qty: 45 TABLET | Refills: 1 | Status: SHIPPED | OUTPATIENT
Start: 2018-07-30 | End: 2019-12-16 | Stop reason: SDUPTHER

## 2018-07-30 NOTE — TELEPHONE ENCOUNTER
Patient was here on 7/26 with Dr Guy Blankenship and states that they discussed a muscle relaxor FLEXERIL 10MG   It did not go to the pharmacy, [facility]  Patient requests that " Do Not Substitute" on the refill  States he has had trouble in the past with the other Rx

## 2018-08-10 DIAGNOSIS — E11.9 TYPE 2 DIABETES MELLITUS WITHOUT COMPLICATION, WITHOUT LONG-TERM CURRENT USE OF INSULIN (HCC): ICD-10-CM

## 2018-08-10 RX ORDER — GLIPIZIDE 10 MG/1
TABLET, FILM COATED, EXTENDED RELEASE ORAL
Qty: 30 TABLET | Refills: 1 | Status: SHIPPED | OUTPATIENT
Start: 2018-08-10 | End: 2018-10-10 | Stop reason: SDUPTHER

## 2018-08-22 ENCOUNTER — TELEPHONE (OUTPATIENT)
Dept: FAMILY MEDICINE CLINIC | Facility: CLINIC | Age: 53
End: 2018-08-22

## 2018-10-10 DIAGNOSIS — E11.9 TYPE 2 DIABETES MELLITUS WITHOUT COMPLICATION, WITHOUT LONG-TERM CURRENT USE OF INSULIN (HCC): ICD-10-CM

## 2018-10-10 RX ORDER — GLIPIZIDE 10 MG/1
10 TABLET, FILM COATED, EXTENDED RELEASE ORAL DAILY
Qty: 30 TABLET | Refills: 0 | Status: SHIPPED | OUTPATIENT
Start: 2018-10-10 | End: 2018-11-06 | Stop reason: SDUPTHER

## 2018-10-24 ENCOUNTER — OFFICE VISIT (OUTPATIENT)
Dept: FAMILY MEDICINE CLINIC | Facility: CLINIC | Age: 53
End: 2018-10-24
Payer: COMMERCIAL

## 2018-10-24 VITALS
OXYGEN SATURATION: 95 % | SYSTOLIC BLOOD PRESSURE: 138 MMHG | HEIGHT: 68 IN | WEIGHT: 232 LBS | RESPIRATION RATE: 16 BRPM | DIASTOLIC BLOOD PRESSURE: 80 MMHG | TEMPERATURE: 98.1 F | HEART RATE: 86 BPM | BODY MASS INDEX: 35.16 KG/M2

## 2018-10-24 DIAGNOSIS — Z23 NEED FOR INFLUENZA VACCINATION: ICD-10-CM

## 2018-10-24 DIAGNOSIS — E11.9 TYPE 2 DIABETES MELLITUS WITHOUT COMPLICATION, WITHOUT LONG-TERM CURRENT USE OF INSULIN (HCC): Primary | ICD-10-CM

## 2018-10-24 DIAGNOSIS — I10 ESSENTIAL HYPERTENSION: ICD-10-CM

## 2018-10-24 DIAGNOSIS — M72.2 PLANTAR FIBROMATOSIS: ICD-10-CM

## 2018-10-24 LAB — SL AMB POCT HEMOGLOBIN AIC: 8.5

## 2018-10-24 PROCEDURE — 90471 IMMUNIZATION ADMIN: CPT

## 2018-10-24 PROCEDURE — 90682 RIV4 VACC RECOMBINANT DNA IM: CPT

## 2018-10-24 PROCEDURE — 3045F PR MOST RECENT HEMOGLOBIN A1C LEVEL 7.0-9.0%: CPT | Performed by: FAMILY MEDICINE

## 2018-10-24 PROCEDURE — 3079F DIAST BP 80-89 MM HG: CPT | Performed by: FAMILY MEDICINE

## 2018-10-24 PROCEDURE — 99214 OFFICE O/P EST MOD 30 MIN: CPT | Performed by: FAMILY MEDICINE

## 2018-10-24 PROCEDURE — 3008F BODY MASS INDEX DOCD: CPT | Performed by: FAMILY MEDICINE

## 2018-10-24 PROCEDURE — 83036 HEMOGLOBIN GLYCOSYLATED A1C: CPT | Performed by: FAMILY MEDICINE

## 2018-10-24 PROCEDURE — 3075F SYST BP GE 130 - 139MM HG: CPT | Performed by: FAMILY MEDICINE

## 2018-10-24 NOTE — ASSESSMENT & PLAN NOTE
COMPLIANT WITH MEDICATION  DENIES ANY NUMBNESS, TINGLING IN FEET    - DISCUSSED DIETARY MODIFICATION OF CARBOHYDRATES  - HGB A1C AND URINE STUDIES DUE TODAY  - FOOT CARE  - RV 3 MONTHS

## 2018-10-24 NOTE — PROGRESS NOTES
Assessment/Plan:    Problem List Items Addressed This Visit        Endocrine    Type 2 diabetes mellitus without complication, without long-term current use of insulin ([facility] [address] ) - Primary     Lab Results   Component Value Date    HGBA1C 9 8 07/26/2018       No results for input(s): POCGLU in the last 72 hours  Blood Sugar Average: Last 72 hrs:   STABLE  DENIES ANY CP, SOB, PALPITATIONS, OR HEADACHE  NOTES NO WATER RETENTION  COMPLIANT WITH MEDICATION  NO CONCERNS    - CONTINUE CURRENT TREATMENT PLAN  - MONITOR DIETARY SODIUM INTAKE  - ENCOURAGE PHYSICAL ACTIVITY  - RV 3 MONTHS           Relevant Orders    POCT hemoglobin A1c       Cardiovascular and Mediastinum    Hypertension     COMPLIANT WITH MEDICATION  DENIES ANY NUMBNESS, TINGLING IN FEET    - DISCUSSED DIETARY MODIFICATION OF CARBOHYDRATES  - HGB A1C AND URINE STUDIES DUE TODAY  - FOOT CARE  - RV 3 MONTHS                Musculoskeletal and Integument    Plantar fibromatosis       Other    Need for influenza vaccination    Relevant Orders    influenza vaccine, [id number], quadrivalent, recombinant, PF, 0 5 mL, for patients 18 yr+ ([facility]OK)          Patient Instructions   CONTINUE CURRENT TREATMENT PLAN  MONITOR DIETARY SODIUM, CHOL, AND CARBOHYDRATE INTAKE  ENCOURAGE PHYSICAL ACTIVITY  FOOT CARE    RV 3 M, SOONER PRN        Return in about 3 months (around 1/24/2019) for Recheck  Subjective:      Patient ID: Arabella Rios is a 46 y o  male      Chief Complaint   Patient presents with    Diabetes    Flu Vaccine       PATIENT RETURNS FOR ROUTINE EVALUATION OF PATIENT'S MEDICAL ISSUES    INDIVIDUAL MEDICAL ISSUES WITH THEIR CURRENT STATUS, ASSESSMENT AND PLANS ARE LISTED ABOVE        C/O PAIN R FOOT REGION  HAD BEEN WALKING  A LOT        The following portions of the patient's history were reviewed and updated as appropriate: allergies, current medications, past family history, past medical history, past social history, past surgical history and problem list     Review of Systems   Constitutional: Negative for chills, fatigue and fever  HENT: Negative for congestion, ear discharge, ear pain, mouth sores, postnasal drip, sore throat and trouble swallowing  Eyes: Negative for pain, discharge and visual disturbance  Respiratory: Negative for cough, shortness of breath and wheezing  Cardiovascular: Negative for chest pain, palpitations and leg swelling  Gastrointestinal: Negative for abdominal distention, abdominal pain, blood in stool, diarrhea and nausea  Endocrine: Negative for polydipsia, polyphagia and polyuria  Genitourinary: Negative for dysuria, frequency, hematuria and urgency  Musculoskeletal: Positive for arthralgias  Negative for gait problem and joint swelling  Skin: Negative for pallor and rash  Neurological: Negative for dizziness, syncope, speech difficulty, weakness, light-headedness, numbness and headaches  Hematological: Negative for adenopathy  Psychiatric/Behavioral: Negative for behavioral problems, confusion and sleep disturbance  The patient is not nervous/anxious            Current Outpatient Prescriptions   Medication Sig Dispense Refill    albuterol (PROVENTIL HFA,VENTOLIN HFA) 90 mcg/act inhaler Inhale 3 puffs 4 times daily      amLODIPine (NORVASC) 10 mg tablet Take 1 tablet by mouth daily      Cholecalciferol (VITAMIN D-3) 5000 units TABS Take 1 tablet by mouth daily      cyclobenzaprine (FLEXERIL) 10 mg tablet Take 1 tablet (10 mg total) by mouth 3 (three) times a day as needed for muscle spasms BRAND ONLY PLEASE 45 tablet 1    FARXIGA 10 MG TABS TAKE 1 TABLET BY MOUTH EVERY DAY 90 tablet 3    glipiZIDE (GLUCOTROL XL) 10 mg 24 hr tablet Take 1 tablet (10 mg total) by mouth daily 30 tablet 0    metFORMIN (GLUCOPHAGE-XR) 500 mg 24 hr tablet Take 1 tablet (500 mg total) by mouth 2 (two) times a day 180 tablet 3    Mometasone Furoate (ASMANEX HFA) 100 MCG/ACT AERO Inhale 1 puff 2 (two) times a day      pantoprazole (PROTONIX) 40 mg tablet Take 1 tablet by mouth daily       No current facility-administered medications for this visit  Objective:    /80   Pulse 86   Temp 98 1 °F (36 7 °C) (Temporal)   Resp 16   Ht 5' 7 5" (1 715 m)   Wt 105 kg (232 lb)   SpO2 95%   BMI 35 80 kg/m²        Physical Exam   Constitutional: He is oriented to person, place, and time  He appears well-developed and well-nourished  HENT:   Head: Normocephalic and atraumatic  Eyes: Pupils are equal, round, and reactive to light  Conjunctivae and EOM are normal  Right eye exhibits no discharge  Left eye exhibits no discharge  Neck: Neck supple  No JVD present  No thyromegaly present  Cardiovascular: Normal rate, regular rhythm and normal heart sounds  No murmur heard  Pulmonary/Chest: Effort normal and breath sounds normal  He has no wheezes  He has no rales  Abdominal: Soft  Bowel sounds are normal  He exhibits no mass  There is no hepatosplenomegaly  There is no tenderness  There is no rebound, no guarding and no CVA tenderness  OBESE   Musculoskeletal: Normal range of motion  He exhibits no edema, tenderness or deformity  PLANTAR FASCIA TENDERNESS R HEEL   Lymphadenopathy:     He has no cervical adenopathy  He has no axillary adenopathy  Neurological: He is alert and oriented to person, place, and time  Skin: Skin is warm and dry  No rash noted  No erythema  Psychiatric: He has a normal mood and affect   His behavior is normal  Judgment and thought content normal               Rakeldraby Simon MD

## 2018-10-24 NOTE — ASSESSMENT & PLAN NOTE
Lab Results   Component Value Date    HGBA1C 9 8 07/26/2018       No results for input(s): POCGLU in the last 72 hours      Blood Sugar Average: Last 72 hrs:   STABLE  DENIES ANY CP, SOB, PALPITATIONS, OR HEADACHE  NOTES NO WATER RETENTION  COMPLIANT WITH MEDICATION  NO CONCERNS    - CONTINUE CURRENT TREATMENT PLAN  - MONITOR DIETARY SODIUM INTAKE  - ENCOURAGE PHYSICAL ACTIVITY  - RV 3 MONTHS

## 2018-11-06 DIAGNOSIS — E11.9 TYPE 2 DIABETES MELLITUS WITHOUT COMPLICATION, WITHOUT LONG-TERM CURRENT USE OF INSULIN (HCC): ICD-10-CM

## 2018-11-07 RX ORDER — GLIPIZIDE 10 MG/1
TABLET, FILM COATED, EXTENDED RELEASE ORAL
Qty: 30 TABLET | Refills: 0 | Status: SHIPPED | OUTPATIENT
Start: 2018-11-07 | End: 2018-11-12 | Stop reason: SDUPTHER

## 2018-11-12 DIAGNOSIS — E11.9 TYPE 2 DIABETES MELLITUS WITHOUT COMPLICATION, WITHOUT LONG-TERM CURRENT USE OF INSULIN (HCC): ICD-10-CM

## 2018-11-12 RX ORDER — GLIPIZIDE 10 MG/1
10 TABLET, FILM COATED, EXTENDED RELEASE ORAL DAILY
Qty: 90 TABLET | Refills: 3 | Status: SHIPPED | OUTPATIENT
Start: 2018-11-12 | End: 2019-05-29 | Stop reason: SDUPTHER

## 2018-11-19 DIAGNOSIS — I10 ESSENTIAL HYPERTENSION: Primary | ICD-10-CM

## 2018-11-19 RX ORDER — AMLODIPINE BESYLATE 10 MG/1
10 TABLET ORAL DAILY
Qty: 90 TABLET | Refills: 3 | Status: SHIPPED | OUTPATIENT
Start: 2018-11-19 | End: 2019-11-22 | Stop reason: SDUPTHER

## 2019-01-17 LAB
LEFT EYE DIABETIC RETINOPATHY: NORMAL
RIGHT EYE DIABETIC RETINOPATHY: NORMAL
SEVERITY (EYE EXAM): NORMAL

## 2019-02-14 ENCOUNTER — OFFICE VISIT (OUTPATIENT)
Dept: FAMILY MEDICINE CLINIC | Facility: CLINIC | Age: 54
End: 2019-02-14
Payer: COMMERCIAL

## 2019-02-14 VITALS
DIASTOLIC BLOOD PRESSURE: 82 MMHG | RESPIRATION RATE: 12 BRPM | BODY MASS INDEX: 35.28 KG/M2 | SYSTOLIC BLOOD PRESSURE: 138 MMHG | WEIGHT: 232.8 LBS | TEMPERATURE: 98 F | HEIGHT: 68 IN | HEART RATE: 101 BPM | OXYGEN SATURATION: 98 %

## 2019-02-14 DIAGNOSIS — J45.20 MILD INTERMITTENT ASTHMA WITHOUT COMPLICATION: ICD-10-CM

## 2019-02-14 DIAGNOSIS — Z13.220 SCREENING FOR HYPERLIPIDEMIA: ICD-10-CM

## 2019-02-14 DIAGNOSIS — I10 ESSENTIAL HYPERTENSION: ICD-10-CM

## 2019-02-14 DIAGNOSIS — Z12.5 ENCOUNTER FOR PROSTATE CANCER SCREENING: ICD-10-CM

## 2019-02-14 DIAGNOSIS — E11.9 TYPE 2 DIABETES MELLITUS WITHOUT COMPLICATION, WITHOUT LONG-TERM CURRENT USE OF INSULIN (HCC): Primary | ICD-10-CM

## 2019-02-14 DIAGNOSIS — Z13.29 SCREENING FOR HYPOTHYROIDISM: ICD-10-CM

## 2019-02-14 LAB
SL AMB  POCT GLUCOSE, UA: 1000
SL AMB LEUKOCYTE ESTERASE,UA: 0
SL AMB POCT BILIRUBIN,UA: 0
SL AMB POCT BLOOD,UA: 0
SL AMB POCT CLARITY,UA: CLEAR
SL AMB POCT COLOR,UA: YELLOW
SL AMB POCT KETONES,UA: 0
SL AMB POCT NITRITE,UA: 0
SL AMB POCT PH,UA: 5
SL AMB POCT SPECIFIC GRAVITY,UA: 1.01
SL AMB POCT URINE PROTEIN: 0
SL AMB POCT UROBILINOGEN: 0

## 2019-02-14 PROCEDURE — 36415 COLL VENOUS BLD VENIPUNCTURE: CPT | Performed by: FAMILY MEDICINE

## 2019-02-14 PROCEDURE — 81003 URINALYSIS AUTO W/O SCOPE: CPT | Performed by: FAMILY MEDICINE

## 2019-02-14 PROCEDURE — 93000 ELECTROCARDIOGRAM COMPLETE: CPT | Performed by: FAMILY MEDICINE

## 2019-02-14 PROCEDURE — 99396 PREV VISIT EST AGE 40-64: CPT | Performed by: FAMILY MEDICINE

## 2019-02-14 NOTE — PROGRESS NOTES
FAMILY PRACTICE HEALTH MAINTENANCE OFFICE VISIT  [facility] - [facility]    NAME: Stoney Nieto  AGE: 53 y o  SEX: male  : 1965     DATE: 2019    Assessment and Plan     Problem List Items Addressed This Visit        Endocrine    Type 2 diabetes mellitus without complication, without long-term current use of insulin ([address] ) - Primary    Relevant Orders    POCT urine dip auto non-scope (Completed)    Lipid panel    Hemoglobin A1c (w/out EAG)    Microalbumin,Urine       Respiratory    Mild intermittent asthma without complication       Cardiovascular and Mediastinum    Hypertension    Relevant Orders    POCT ECG (Completed)    Comprehensive metabolic panel    Uric acid       Other    Encounter for prostate cancer screening    Relevant Orders    PSA, total and free    Screening for hypothyroidism    Relevant Orders    TSH, 3rd generation    Screening for hyperlipidemia    Relevant Orders    CBC and differential    Lipid panel               Return in about 3 months (around 2019) for Recheck  Chief Complaint     Chief Complaint   Patient presents with    Annual Exam       History of Present Illness     [address] [place] [address] Rd RECORD  NO CONCERNS AT THIS TIME        Well Adult Physical   Patient here for a comprehensive physical exam       Diet and Physical Activity  Diet: well balanced diet  Weight concerns: Patient has class 2 obesity (BMI 35 0-39  9)  Exercise: frequently      Depression Screen  PHQ-9 Depression Screening    PHQ-9:    Frequency of the following problems over the past two weeks:               General Health  Hearing: Normal:  bilateral  Vision: no vision problems and wears glasses  Dental: regular dental visits    Reproductive Health          The following portions of the patient's history were reviewed and updated as appropriate: allergies, current medications, past family history, past medical history, past social history, past surgical history and problem list     Review of Systems     Review of Systems   Constitutional: Negative for chills, fatigue and fever  HENT: Negative for congestion, ear discharge, ear pain, mouth sores, postnasal drip, sore throat and trouble swallowing  Eyes: Negative for pain, discharge and visual disturbance  Respiratory: Negative for cough, shortness of breath and wheezing  Cardiovascular: Negative for chest pain, palpitations and leg swelling  Gastrointestinal: Negative for abdominal distention, abdominal pain, blood in stool, diarrhea and nausea  Endocrine: Negative for polydipsia, polyphagia and polyuria  Genitourinary: Negative for dysuria, frequency, hematuria and urgency  Musculoskeletal: Negative for arthralgias, gait problem and joint swelling  Skin: Negative for pallor and rash  Neurological: Negative for dizziness, syncope, speech difficulty, weakness, light-headedness, numbness and headaches  Hematological: Negative for adenopathy  Psychiatric/Behavioral: Negative for behavioral problems, confusion and sleep disturbance  The patient is not nervous/anxious  Past Medical History     Past Medical History:   Diagnosis Date    Asthma     Diabetes mellitus ([facility] [address] )     Hypertension        Past Surgical History     History reviewed  No pertinent surgical history      Social History     Social History     Socioeconomic History    Marital status: /Civil Union     Spouse name: None    Number of children: None    Years of education: None    Highest education level: None   Occupational History    None   Social Needs    Financial resource strain: None    Food insecurity:     Worry: None     Inability: None    Transportation needs:     Medical: None     Non-medical: None   Tobacco Use    Smoking status: Former Smoker     Packs/day: 0 25    Smokeless tobacco: Never Used    Tobacco comment: current some day smoker and former smoker as per [employer] Substance and Sexual Activity    Alcohol use: Yes     Frequency: Monthly or less     Drinks per session: 1 or 2     Binge frequency: Never    Drug use: No    Sexual activity: None   Lifestyle    Physical activity:     Days per week: None     Minutes per session: None    Stress: None   Relationships    Social connections:     Talks on phone: None     Gets together: None     Attends Restorationist service: None     Active member of club or organization: None     Attends meetings of clubs or organizations: None     Relationship status: None    Intimate partner violence:     Fear of current or ex partner: None     Emotionally abused: None     Physically abused: None     Forced sexual activity: None   Other Topics Concern    None   Social History Narrative    None       Family History     Family History   Problem Relation Age of Onset    Breast cancer Mother     Mental illness Neg Hx     Substance Abuse Neg Hx        Current Medications       Current Outpatient Medications:     albuterol (PROVENTIL HFA,VENTOLIN HFA) 90 mcg/act inhaler, Inhale 3 puffs 4 times daily, Disp: , Rfl:     amLODIPine (NORVASC) 10 mg tablet, Take 1 tablet (10 mg total) by mouth daily, Disp: 90 tablet, Rfl: 3    Cholecalciferol (VITAMIN D-3) 5000 units TABS, Take 1 tablet by mouth daily, Disp: , Rfl:     cyclobenzaprine (FLEXERIL) 10 mg tablet, Take 1 tablet (10 mg total) by mouth 3 (three) times a day as needed for muscle spasms BRAND ONLY PLEASE, Disp: 45 tablet, Rfl: 1    FARXIGA 10 MG TABS, TAKE 1 TABLET BY MOUTH EVERY DAY, Disp: 90 tablet, Rfl: 3    glipiZIDE (GLUCOTROL XL) 10 mg 24 hr tablet, Take 1 tablet (10 mg total) by mouth daily, Disp: 90 tablet, Rfl: 3    metFORMIN (GLUCOPHAGE-XR) 500 mg 24 hr tablet, Take 1 tablet (500 mg total) by mouth 2 (two) times a day (Patient taking differently: Take 500 mg by mouth daily with breakfast ), Disp: 180 tablet, Rfl: 3    Mometasone Furoate (ASMANEX HFA) 100 MCG/ACT AERO, Inhale 1 puff 2 (two) times a day, Disp: , Rfl:     pantoprazole (PROTONIX) 40 mg tablet, Take 1 tablet by mouth daily, Disp: , Rfl:      Allergies     No Known Allergies    Objective     /82 (BP Location: Left arm, Patient Position: Sitting, Cuff Size: Large)   Pulse 101   Temp 98 °F (36 7 °C) (Temporal)   Resp 12   Ht 5' 7 5" (1 715 m)   Wt 106 kg (232 lb 12 8 oz)   SpO2 98%   BMI 35 92 kg/m²      Physical Exam   Constitutional: He is oriented to person, place, and time  He appears well-developed and well-nourished  HENT:   Head: Normocephalic and atraumatic  Right Ear: External ear normal    Left Ear: External ear normal    Nose: Nose normal    Mouth/Throat: Oropharynx is clear and moist    Eyes: Pupils are equal, round, and reactive to light  Conjunctivae and EOM are normal  Right eye exhibits no discharge  Left eye exhibits no discharge  Neck: Neck supple  No JVD present  No thyromegaly present  Cardiovascular: Normal rate, regular rhythm, normal heart sounds and intact distal pulses  Pulses are no weak pulses  No murmur heard  Pulses:       Dorsalis pedis pulses are 2+ on the right side, and 2+ on the left side  Posterior tibial pulses are 2+ on the right side, and 2+ on the left side  Pulmonary/Chest: Effort normal and breath sounds normal  He has no wheezes  He has no rales  Abdominal: Soft  Bowel sounds are normal  He exhibits no mass  There is no hepatosplenomegaly  There is no tenderness  There is no rebound, no guarding and no CVA tenderness  Musculoskeletal: Normal range of motion  He exhibits no edema, tenderness or deformity  Feet:   Right Foot:   Skin Integrity: Negative for ulcer, skin breakdown, erythema, warmth, callus or dry skin  Left Foot:   Skin Integrity: Negative for ulcer, skin breakdown, erythema, warmth, callus or dry skin  Lymphadenopathy:     He has no cervical adenopathy  He has no axillary adenopathy     Neurological: He is alert and oriented to person, place, and time  He has normal reflexes  No cranial nerve deficit  He exhibits normal muscle tone  Coordination normal    Skin: Skin is warm and dry  No rash noted  No erythema  Psychiatric: He has a normal mood and affect  His behavior is normal  Judgment and thought content normal          Patient's shoes and socks removed  Right Foot/Ankle   Right Foot Inspection  Skin Exam: skin normal and skin intact no dry skin, no warmth, no callus, no erythema, no maceration, no abnormal color, no pre-ulcer, no ulcer and no callus                            Sensory       Monofilament testing: intact  Vascular    The right DP pulse is 2+  The right PT pulse is 2+  Left Foot/Ankle  Left Foot Inspection  Skin Exam: skin normal and skin intactno dry skin, no warmth, no erythema, no maceration, normal color, no pre-ulcer, no ulcer and no callus                                         Sensory       Monofilament: intact  Vascular    The left DP pulse is 2+  The left PT pulse is 2+  Assign Risk Category:  No deformity present; No loss of protective sensation;  No weak pulses       Risk: 0      No exam data present    Health Maintenance     Health Maintenance   Topic Date Due    Hepatitis C Screening  1965    CRC Screening: Colonoscopy  1965    DM Eye Exam  01/12/1974    BMI: Followup Plan  01/12/1982    HEPATITIS B VACCINES (1 of 3 - Risk 3-dose series) 01/12/1983    Pneumococcal PPSV23 Medium Risk Adult (1 of 1 - PPSV23) 01/12/1983    DTaP,Tdap,and Td Vaccines (1 - Tdap) 01/12/1985    Diabetic Foot Exam  03/15/2019    URINE MICROALBUMIN  03/15/2019    HEMOGLOBIN A1C  04/24/2019    Depression Screening PHQ  06/14/2019    BMI: Adult  10/24/2019    INFLUENZA VACCINE  Completed     Immunization History   Administered Date(s) Administered    Influenza Quadrivalent, 6-35 Months IM 09/27/2017    Influenza, recombinant, quadrivalent,injectable, preservative free 10/24/2018       Laura Victor, MD Wolfgang Patel

## 2019-02-15 LAB
ALBUMIN SERPL-MCNC: 4.2 G/DL (ref 3.6–5.1)
ALBUMIN/GLOB SERPL: 1.4 (CALC) (ref 1–2.5)
ALP SERPL-CCNC: 104 U/L (ref 40–115)
ALT SERPL-CCNC: 25 U/L (ref 9–46)
AST SERPL-CCNC: 18 U/L (ref 10–35)
BASOPHILS # BLD AUTO: 29 CELLS/UL (ref 0–200)
BASOPHILS NFR BLD AUTO: 0.6 %
BILIRUB SERPL-MCNC: 0.4 MG/DL (ref 0.2–1.2)
BUN SERPL-MCNC: 13 MG/DL (ref 7–25)
BUN/CREAT SERPL: ABNORMAL (CALC) (ref 6–22)
CALCIUM SERPL-MCNC: 9.6 MG/DL (ref 8.6–10.3)
CHLORIDE SERPL-SCNC: 103 MMOL/L (ref 98–110)
CHOLEST SERPL-MCNC: 239 MG/DL
CHOLEST/HDLC SERPL: 5.8 (CALC)
CO2 SERPL-SCNC: 25 MMOL/L (ref 20–32)
CREAT SERPL-MCNC: 1.02 MG/DL (ref 0.7–1.33)
EOSINOPHIL # BLD AUTO: 101 CELLS/UL (ref 15–500)
EOSINOPHIL NFR BLD AUTO: 2.1 %
ERYTHROCYTE [DISTWIDTH] IN BLOOD BY AUTOMATED COUNT: 16.4 % (ref 11–15)
GLOBULIN SER CALC-MCNC: 3 G/DL (CALC) (ref 1.9–3.7)
GLUCOSE SERPL-MCNC: 354 MG/DL (ref 65–99)
HBA1C MFR BLD: 11.7 % OF TOTAL HGB
HCT VFR BLD AUTO: 53.3 % (ref 38.5–50)
HDLC SERPL-MCNC: 41 MG/DL
HGB BLD-MCNC: 17.7 G/DL (ref 13.2–17.1)
LYMPHOCYTES # BLD AUTO: 1502 CELLS/UL (ref 850–3900)
LYMPHOCYTES NFR BLD AUTO: 31.3 %
MCH RBC QN AUTO: 26.9 PG (ref 27–33)
MCHC RBC AUTO-ENTMCNC: 33.2 G/DL (ref 32–36)
MCV RBC AUTO: 80.9 FL (ref 80–100)
MICROALBUMIN UR-MCNC: 1.7 MG/DL
MONOCYTES # BLD AUTO: 408 CELLS/UL (ref 200–950)
MONOCYTES NFR BLD AUTO: 8.5 %
NEUTROPHILS # BLD AUTO: 2760 CELLS/UL (ref 1500–7800)
NEUTROPHILS NFR BLD AUTO: 57.5 %
NONHDLC SERPL-MCNC: 198 MG/DL (CALC)
PLATELET # BLD AUTO: 308 THOUSAND/UL (ref 140–400)
PMV BLD REES-ECKER: 9.5 FL (ref 7.5–12.5)
POTASSIUM SERPL-SCNC: 3.9 MMOL/L (ref 3.5–5.3)
PROT SERPL-MCNC: 7.2 G/DL (ref 6.1–8.1)
PSA FREE MFR SERPL: 27 % (CALC)
PSA FREE SERPL-MCNC: 0.3 NG/ML
PSA SERPL-MCNC: 1.1 NG/ML
RBC # BLD AUTO: 6.59 MILLION/UL (ref 4.2–5.8)
SL AMB EGFR AFRICAN AMERICAN: 95 ML/MIN/1.73M2
SL AMB EGFR NON AFRICAN AMERICAN: 82 ML/MIN/1.73M2
SODIUM SERPL-SCNC: 136 MMOL/L (ref 135–146)
TRIGL SERPL-MCNC: 412 MG/DL
TSH SERPL-ACNC: 1.4 MIU/L (ref 0.4–4.5)
URATE SERPL-MCNC: 2.9 MG/DL (ref 4–8)
WBC # BLD AUTO: 4.8 THOUSAND/UL (ref 3.8–10.8)

## 2019-03-30 ENCOUNTER — DOCUMENTATION (OUTPATIENT)
Dept: FAMILY MEDICINE CLINIC | Facility: CLINIC | Age: 54
End: 2019-03-30

## 2019-03-30 DIAGNOSIS — J45.20 MILD INTERMITTENT ASTHMA WITHOUT COMPLICATION: Primary | ICD-10-CM

## 2019-03-30 RX ORDER — ALBUTEROL SULFATE 90 UG/1
2 AEROSOL, METERED RESPIRATORY (INHALATION) EVERY 4 HOURS PRN
Qty: 1 INHALER | Refills: 3 | Status: SHIPPED | OUTPATIENT
Start: 2019-03-30 | End: 2021-06-22 | Stop reason: SDUPTHER

## 2019-05-16 ENCOUNTER — OFFICE VISIT (OUTPATIENT)
Dept: FAMILY MEDICINE CLINIC | Facility: CLINIC | Age: 54
End: 2019-05-16
Payer: COMMERCIAL

## 2019-05-16 VITALS
OXYGEN SATURATION: 95 % | TEMPERATURE: 97.6 F | BODY MASS INDEX: 35.01 KG/M2 | DIASTOLIC BLOOD PRESSURE: 78 MMHG | WEIGHT: 231 LBS | SYSTOLIC BLOOD PRESSURE: 128 MMHG | HEIGHT: 68 IN | RESPIRATION RATE: 12 BRPM | HEART RATE: 98 BPM

## 2019-05-16 DIAGNOSIS — E11.9 TYPE 2 DIABETES MELLITUS WITHOUT COMPLICATION, WITHOUT LONG-TERM CURRENT USE OF INSULIN (HCC): Primary | ICD-10-CM

## 2019-05-16 DIAGNOSIS — I10 ESSENTIAL HYPERTENSION: ICD-10-CM

## 2019-05-16 DIAGNOSIS — J45.20 MILD INTERMITTENT ASTHMA WITHOUT COMPLICATION: ICD-10-CM

## 2019-05-16 PROBLEM — Z13.29 SCREENING FOR HYPOTHYROIDISM: Status: RESOLVED | Noted: 2019-02-14 | Resolved: 2019-05-16

## 2019-05-16 PROBLEM — Z23 NEED FOR INFLUENZA VACCINATION: Status: RESOLVED | Noted: 2018-10-24 | Resolved: 2019-05-16

## 2019-05-16 PROBLEM — Z13.220 SCREENING FOR HYPERLIPIDEMIA: Status: RESOLVED | Noted: 2019-02-14 | Resolved: 2019-05-16

## 2019-05-16 PROBLEM — Z12.5 ENCOUNTER FOR PROSTATE CANCER SCREENING: Status: RESOLVED | Noted: 2019-02-14 | Resolved: 2019-05-16

## 2019-05-16 LAB — SL AMB POCT HEMOGLOBIN AIC: 10.3 (ref ?–6.5)

## 2019-05-16 PROCEDURE — 3078F DIAST BP <80 MM HG: CPT | Performed by: FAMILY MEDICINE

## 2019-05-16 PROCEDURE — 99214 OFFICE O/P EST MOD 30 MIN: CPT | Performed by: FAMILY MEDICINE

## 2019-05-16 PROCEDURE — 3074F SYST BP LT 130 MM HG: CPT | Performed by: FAMILY MEDICINE

## 2019-05-16 PROCEDURE — 3046F HEMOGLOBIN A1C LEVEL >9.0%: CPT | Performed by: FAMILY MEDICINE

## 2019-05-16 PROCEDURE — 83036 HEMOGLOBIN GLYCOSYLATED A1C: CPT | Performed by: FAMILY MEDICINE

## 2019-05-16 PROCEDURE — 3008F BODY MASS INDEX DOCD: CPT | Performed by: FAMILY MEDICINE

## 2019-05-29 DIAGNOSIS — E11.9 TYPE 2 DIABETES MELLITUS WITHOUT COMPLICATION, WITHOUT LONG-TERM CURRENT USE OF INSULIN (HCC): ICD-10-CM

## 2019-05-29 RX ORDER — GLIPIZIDE 10 MG/1
10 TABLET, FILM COATED, EXTENDED RELEASE ORAL 2 TIMES DAILY
Qty: 90 TABLET | Refills: 3 | Status: SHIPPED | OUTPATIENT
Start: 2019-05-29 | End: 2020-01-01

## 2019-06-07 DIAGNOSIS — E11.9 TYPE 2 DIABETES MELLITUS WITHOUT COMPLICATION, WITHOUT LONG-TERM CURRENT USE OF INSULIN (HCC): ICD-10-CM

## 2019-06-07 RX ORDER — DAPAGLIFLOZIN 10 MG/1
TABLET, FILM COATED ORAL
Qty: 90 TABLET | Refills: 3 | Status: SHIPPED | OUTPATIENT
Start: 2019-06-07 | End: 2020-05-18

## 2019-09-13 RX ORDER — LANCETS
EACH MISCELLANEOUS
COMMUNITY

## 2019-09-13 RX ORDER — CLINDAMYCIN HYDROCHLORIDE 300 MG/1
CAPSULE ORAL
COMMUNITY
End: 2019-10-11 | Stop reason: ALTCHOICE

## 2019-09-16 ENCOUNTER — TELEPHONE (OUTPATIENT)
Dept: FAMILY MEDICINE CLINIC | Facility: CLINIC | Age: 54
End: 2019-09-16

## 2019-09-16 NOTE — TELEPHONE ENCOUNTER
PLEASE CALL JEANIE    LET HIM KNOW THAT THE LOCAL PHARMS DO NOT SEEM TO CARRY THEM      IF HE CAN FIND A PHARMACY THAT HAS ONE I WILL CALL IT IN    THE [facility]

## 2019-09-16 NOTE — TELEPHONE ENCOUNTER
Yomi Mojica called back  I informed him of Dr Paredes's response  No further action required at this time

## 2019-09-16 NOTE — TELEPHONE ENCOUNTER
Dr Andrey Fischer   I called the pharmacy and they do not have large leg air splints in stock  They do not submit through insurance either  What they have in stock, is what they sell OTC    Celeste Valenzuela

## 2019-10-11 ENCOUNTER — OFFICE VISIT (OUTPATIENT)
Dept: FAMILY MEDICINE CLINIC | Facility: CLINIC | Age: 54
End: 2019-10-11
Payer: COMMERCIAL

## 2019-10-11 VITALS
HEART RATE: 100 BPM | DIASTOLIC BLOOD PRESSURE: 80 MMHG | TEMPERATURE: 97.9 F | RESPIRATION RATE: 20 BRPM | SYSTOLIC BLOOD PRESSURE: 130 MMHG | BODY MASS INDEX: 35.46 KG/M2 | WEIGHT: 234 LBS | OXYGEN SATURATION: 94 % | HEIGHT: 68 IN

## 2019-10-11 DIAGNOSIS — I10 ESSENTIAL HYPERTENSION: ICD-10-CM

## 2019-10-11 DIAGNOSIS — K21.00 GASTRO-ESOPHAGEAL REFLUX DISEASE WITH ESOPHAGITIS: ICD-10-CM

## 2019-10-11 DIAGNOSIS — E11.9 TYPE 2 DIABETES MELLITUS WITHOUT COMPLICATION, WITHOUT LONG-TERM CURRENT USE OF INSULIN (HCC): Primary | ICD-10-CM

## 2019-10-11 DIAGNOSIS — Z23 NEED FOR INFLUENZA VACCINATION: ICD-10-CM

## 2019-10-11 DIAGNOSIS — M25.579 ACUTE ANKLE PAIN, UNSPECIFIED LATERALITY: ICD-10-CM

## 2019-10-11 PROBLEM — K44.9 HIATAL HERNIA: Status: ACTIVE | Noted: 2019-10-11

## 2019-10-11 LAB — SL AMB POCT HEMOGLOBIN AIC: 11.4 (ref ?–6.5)

## 2019-10-11 PROCEDURE — 3075F SYST BP GE 130 - 139MM HG: CPT | Performed by: FAMILY MEDICINE

## 2019-10-11 PROCEDURE — 90471 IMMUNIZATION ADMIN: CPT

## 2019-10-11 PROCEDURE — 90682 RIV4 VACC RECOMBINANT DNA IM: CPT

## 2019-10-11 PROCEDURE — 83036 HEMOGLOBIN GLYCOSYLATED A1C: CPT | Performed by: FAMILY MEDICINE

## 2019-10-11 PROCEDURE — 99213 OFFICE O/P EST LOW 20 MIN: CPT | Performed by: FAMILY MEDICINE

## 2019-10-11 PROCEDURE — 3008F BODY MASS INDEX DOCD: CPT | Performed by: FAMILY MEDICINE

## 2019-10-11 PROCEDURE — 3079F DIAST BP 80-89 MM HG: CPT | Performed by: FAMILY MEDICINE

## 2019-10-11 NOTE — LETTER
October 11, 2019     Liset Vincent, [address] [facility]    Patient: Judah Patel   YOB: 1965   Date of Visit: 10/11/2019       Dear Dr Baker Service: Thank you for referring Judah Patel to me for evaluation  Below are my notes for this consultation  If you have questions, please do not hesitate to call me  I look forward to following your patient along with you  Sincerely,        Eduardo Chiu MD        CC: No Recipients  Eduardo Chiu MD  10/11/2019 12:52 PM  Incomplete  Assessment/Plan:    1  Type 2 diabetes mellitus without complication, without long-term current use of insulin (HCC)  Assessment & Plan:    Lab Results   Component Value Date    HGBA1C 11 4 (A) 10/11/2019       COMPLIANT WITH MEDICATION  NOT VERY COMPLIANT WITH DIET  LAST HGB A1C WAS 10 3  DENIES ANY NUMBNESS, TINGLING IN FEET    - DISCUSSED DIETARY MODIFICATION OF CARBOHYDRATES  - HGB A1C AND URINE STUDIES DUE TODAY  - FOOT CARE  - ENDOCRINE CONSULT  - RV 3 MONTHS        Orders:  -     POCT hemoglobin A1c  -     Ambulatory referral to Endocrinology; Future    2  Essential hypertension  Assessment & Plan:  STABLE  DENIES ANY CP, SOB, PALPITATIONS, OR HEADACHE  NOTES NO WATER RETENTION  COMPLIANT WITH MEDICATION  NO CONCERNS    - CONTINUE CURRENT TREATMENT PLAN  - MONITOR DIETARY SODIUM INTAKE  - ENCOURAGE PHYSICAL ACTIVITY  - RV 3 MONTHS        3  Gastro-esophageal reflux disease with esophagitis  Assessment & Plan:  STABLE  DENIES ANY CP, INDIGESTION, OR COUGH  NOTES NO MELENA OR HEMATOCHEZIA  NO HEMATEMESIS  COMPLIANT WITH MEDICATION  NO CONCERNS    - CONTINUE CURRENT TREATMENT PLAN  - MEDICATION AS PRESCRIBED  - AVOID CAFFEINE, ALCOHOL OR SMOKING        4  Need for influenza vaccination  -     influenza vaccine, [id number], quadrivalent, recombinant, PF, 0 5 mL, for patients 18 yr+ (FLUBLOK)        BMI Counseling: Body mass index is 36 11 kg/m²   Discussed the patient's BMI with him  The BMI is above normal  Nutrition recommendations include decreasing overall calorie intake and moderation in carbohydrate intake  Exercise recommendations include exercising 3-5 times per week  Patient Instructions   CONTINUE CURRENT TREATMENT PLAN  MONITOR DIETARY SODIUM, CHOL, AND CARBOHYDRATE INTAKE  ENCOURAGE PHYSICAL ACTIVITY  FOOT CARE    ENDOCRINE CONSULT    RV 3 M, SOONER PRN        Return in about 3 months (around 1/11/2020) for Recheck  Subjective:      Patient ID: Thalia Avelar is a 53 y o  male  Chief Complaint   Patient presents with    Diabetes       PATIENT RETURNS FOR ROUTINE EVALUATION OF PATIENT'S MEDICAL ISSUES    INDIVIDUAL MEDICAL ISSUES WITH THEIR CURRENT STATUS, ASSESSMENT AND PLANS ARE LISTED ABOVE          The following portions of the patient's history were reviewed and updated as appropriate: allergies, current medications, past family history, past medical history, past social history, past surgical history and problem list     Review of Systems   Constitutional: Negative for chills, fatigue and fever  HENT: Negative for congestion, ear discharge, ear pain, mouth sores, postnasal drip, sore throat and trouble swallowing  Eyes: Negative for pain, discharge and visual disturbance  Respiratory: Negative for cough, shortness of breath and wheezing  Cardiovascular: Negative for chest pain, palpitations and leg swelling  Gastrointestinal: Negative for abdominal distention, abdominal pain, blood in stool, diarrhea and nausea  Endocrine: Negative for polydipsia, polyphagia and polyuria  Genitourinary: Negative for dysuria, frequency, hematuria and urgency  Musculoskeletal: Negative for arthralgias, gait problem and joint swelling  Skin: Negative for pallor and rash  Neurological: Negative for dizziness, syncope, speech difficulty, weakness, light-headedness, numbness and headaches  Hematological: Negative for adenopathy  Psychiatric/Behavioral: Negative for behavioral problems, confusion and sleep disturbance  The patient is not nervous/anxious  Current Outpatient Medications   Medication Sig Dispense Refill    albuterol (PROVENTIL HFA,VENTOLIN HFA) 90 mcg/act inhaler Inhale 2 puffs every 4 (four) hours as needed for wheezing 1 Inhaler 3    amLODIPine (NORVASC) 10 mg tablet Take 1 tablet (10 mg total) by mouth daily 90 tablet 3    Cholecalciferol (VITAMIN D-3) 5000 units TABS Take 1 tablet by mouth daily      cyclobenzaprine (FLEXERIL) 10 mg tablet Take 1 tablet (10 mg total) by mouth 3 (three) times a day as needed for muscle spasms BRAND ONLY PLEASE 45 tablet 1    FARXIGA 10 MG TABS TAKE 1 TABLET BY MOUTH EVERY DAY 90 tablet 3    glipiZIDE (GLUCOTROL XL) 10 mg 24 hr tablet Take 1 tablet (10 mg total) by mouth 2 (two) times a day 90 tablet 3    glucose blood test strip Contour Meter      glucose blood test strip Contour Test Strips      MICROLET LANCETS MISC Microlet Lancet      Mometasone Furoate (ASMANEX HFA) 100 MCG/ACT AERO Inhale 1 puff 2 (two) times a day      Mometasone Furoate (ASMANEX HFA) 100 MCG/ACT AERO Asmanex  mcg/actuation aerosol inhaler   daily      pantoprazole (PROTONIX) 40 mg tablet Take 1 tablet by mouth daily      metFORMIN (GLUCOPHAGE-XR) 500 mg 24 hr tablet Take 1 tablet (500 mg total) by mouth 2 (two) times a day (Patient not taking: Reported on 5/16/2019) 180 tablet 3     No current facility-administered medications for this visit  Objective:    /80   Pulse 100   Temp 97 9 °F (36 6 °C) (Temporal)   Resp 20   Ht 5' 7 5" (1 715 m)   Wt 106 kg (234 lb)   SpO2 94%   BMI 36 11 kg/m²         Physical Exam   Constitutional: He is oriented to person, place, and time  He appears well-developed and well-nourished  HENT:   Head: Normocephalic and atraumatic  Eyes: Pupils are equal, round, and reactive to light   Conjunctivae and EOM are normal  Right eye exhibits no discharge  Left eye exhibits no discharge  Neck: Neck supple  No JVD present  No thyromegaly present  Cardiovascular: Normal rate, regular rhythm and normal heart sounds  No murmur heard  Pulmonary/Chest: Effort normal and breath sounds normal  He has no wheezes  He has no rales  Abdominal: Soft  Bowel sounds are normal  He exhibits no mass  There is no hepatosplenomegaly  There is no tenderness  There is no rebound, no guarding and no CVA tenderness  OBESE   Musculoskeletal: Normal range of motion  He exhibits no edema, tenderness or deformity  Lymphadenopathy:     He has no cervical adenopathy  He has no axillary adenopathy  Neurological: He is alert and oriented to person, place, and time  Skin: Skin is warm and dry  No rash noted  No erythema  Psychiatric: He has a normal mood and affect   His behavior is normal  Judgment and thought content normal               Arin Lipscomb MD

## 2019-10-11 NOTE — PROGRESS NOTES
Assessment/Plan:    1  Type 2 diabetes mellitus without complication, without long-term current use of insulin (HCC)  Assessment & Plan:    Lab Results   Component Value Date    HGBA1C 11 4 (A) 10/11/2019       COMPLIANT WITH MEDICATION  NOT VERY COMPLIANT WITH DIET  LAST HGB A1C WAS 10 3  DENIES ANY NUMBNESS, TINGLING IN FEET    - DISCUSSED DIETARY MODIFICATION OF CARBOHYDRATES  - HGB A1C AND URINE STUDIES DUE TODAY  - FOOT CARE  - ENDOCRINE CONSULT  - RV 3 MONTHS        Orders:  -     POCT hemoglobin A1c  -     Ambulatory referral to Endocrinology; Future    2  Essential hypertension  Assessment & Plan:  STABLE  DENIES ANY CP, SOB, PALPITATIONS, OR HEADACHE  NOTES NO WATER RETENTION  COMPLIANT WITH MEDICATION  NO CONCERNS    - CONTINUE CURRENT TREATMENT PLAN  - MONITOR DIETARY SODIUM INTAKE  - ENCOURAGE PHYSICAL ACTIVITY  - RV 3 MONTHS        3  Gastro-esophageal reflux disease with esophagitis  Assessment & Plan:  STABLE  DENIES ANY CP, INDIGESTION, OR COUGH  NOTES NO MELENA OR HEMATOCHEZIA  NO HEMATEMESIS  COMPLIANT WITH MEDICATION  NO CONCERNS    - CONTINUE CURRENT TREATMENT PLAN  - MEDICATION AS PRESCRIBED  - AVOID CAFFEINE, ALCOHOL OR SMOKING        4  Need for influenza vaccination  -     influenza vaccine, [id number], quadrivalent, recombinant, PF, 0 5 mL, for patients 18 yr+ (FLUBLOK)        BMI Counseling: Body mass index is 36 11 kg/m²  Discussed the patient's BMI with him  The BMI is above normal  Nutrition recommendations include decreasing overall calorie intake and moderation in carbohydrate intake  Exercise recommendations include exercising 3-5 times per week            Patient Instructions     CONTINUE CURRENT TREATMENT PLAN  MONITOR DIETARY SODIUM, CHOL, AND CARBOHYDRATE INTAKE  ENCOURAGE PHYSICAL ACTIVITY  FOOT CARE    ENDOCRINE CONSULT    RV 3 M, SOONER PRN      Recent Results (from the past 672 hour(s))   POCT hemoglobin A1c    Collection Time: 10/11/19 11:35 AM   Result Value Ref Range Hemoglobin A1C 11 4 (A) 6 5           Return in about 3 months (around 1/11/2020) for Recheck  Subjective:      Patient ID: Stephenie Rodrigues is a 53 y o  male  Chief Complaint   Patient presents with    Diabetes       PATIENT RETURNS FOR ROUTINE EVALUATION OF PATIENT'S MEDICAL ISSUES    INDIVIDUAL MEDICAL ISSUES WITH THEIR CURRENT STATUS, ASSESSMENT AND PLANS ARE LISTED ABOVE          The following portions of the patient's history were reviewed and updated as appropriate: allergies, current medications, past family history, past medical history, past social history, past surgical history and problem list     Review of Systems   Constitutional: Negative for chills, fatigue and fever  HENT: Negative for congestion, ear discharge, ear pain, mouth sores, postnasal drip, sore throat and trouble swallowing  Eyes: Negative for pain, discharge and visual disturbance  Respiratory: Negative for cough, shortness of breath and wheezing  Cardiovascular: Negative for chest pain, palpitations and leg swelling  Gastrointestinal: Negative for abdominal distention, abdominal pain, blood in stool, diarrhea and nausea  Endocrine: Negative for polydipsia, polyphagia and polyuria  Genitourinary: Negative for dysuria, frequency, hematuria and urgency  Musculoskeletal: Negative for arthralgias, gait problem and joint swelling  Skin: Negative for pallor and rash  Neurological: Negative for dizziness, syncope, speech difficulty, weakness, light-headedness, numbness and headaches  Hematological: Negative for adenopathy  Psychiatric/Behavioral: Negative for behavioral problems, confusion and sleep disturbance  The patient is not nervous/anxious            Current Outpatient Medications   Medication Sig Dispense Refill    albuterol (PROVENTIL HFA,VENTOLIN HFA) 90 mcg/act inhaler Inhale 2 puffs every 4 (four) hours as needed for wheezing 1 Inhaler 3    amLODIPine (NORVASC) 10 mg tablet Take 1 tablet (10 mg total) by mouth daily 90 tablet 3    Cholecalciferol (VITAMIN D-3) 5000 units TABS Take 1 tablet by mouth daily      cyclobenzaprine (FLEXERIL) 10 mg tablet Take 1 tablet (10 mg total) by mouth 3 (three) times a day as needed for muscle spasms BRAND ONLY PLEASE 45 tablet 1    FARXIGA 10 MG TABS TAKE 1 TABLET BY MOUTH EVERY DAY 90 tablet 3    glipiZIDE (GLUCOTROL XL) 10 mg 24 hr tablet Take 1 tablet (10 mg total) by mouth 2 (two) times a day 90 tablet 3    glucose blood test strip Contour Meter      glucose blood test strip Contour Test Strips      MICROLET LANCETS MISC Microlet Lancet      Mometasone Furoate (ASMANEX HFA) 100 MCG/ACT AERO Inhale 1 puff 2 (two) times a day      Mometasone Furoate (ASMANEX HFA) 100 MCG/ACT AERO Asmanex  mcg/actuation aerosol inhaler   daily      pantoprazole (PROTONIX) 40 mg tablet Take 1 tablet by mouth daily      metFORMIN (GLUCOPHAGE-XR) 500 mg 24 hr tablet Take 1 tablet (500 mg total) by mouth 2 (two) times a day (Patient not taking: Reported on 5/16/2019) 180 tablet 3     No current facility-administered medications for this visit  Objective:    /80   Pulse 100   Temp 97 9 °F (36 6 °C) (Temporal)   Resp 20   Ht 5' 7 5" (1 715 m)   Wt 106 kg (234 lb)   SpO2 94%   BMI 36 11 kg/m²        Physical Exam   Constitutional: He is oriented to person, place, and time  He appears well-developed and well-nourished  HENT:   Head: Normocephalic and atraumatic  Eyes: Pupils are equal, round, and reactive to light  Conjunctivae and EOM are normal  Right eye exhibits no discharge  Left eye exhibits no discharge  Neck: Neck supple  No JVD present  No thyromegaly present  Cardiovascular: Normal rate, regular rhythm and normal heart sounds  No murmur heard  Pulmonary/Chest: Effort normal and breath sounds normal  He has no wheezes  He has no rales  Abdominal: Soft  Bowel sounds are normal  He exhibits no mass  There is no hepatosplenomegaly   There is no tenderness  There is no rebound, no guarding and no CVA tenderness  OBESE   Musculoskeletal: Normal range of motion  He exhibits no edema, tenderness or deformity  Lymphadenopathy:     He has no cervical adenopathy  He has no axillary adenopathy  Neurological: He is alert and oriented to person, place, and time  Skin: Skin is warm and dry  No rash noted  No erythema  Psychiatric: He has a normal mood and affect   His behavior is normal  Judgment and thought content normal               Binh Dawn MD

## 2019-10-11 NOTE — PATIENT INSTRUCTIONS
CONTINUE CURRENT TREATMENT PLAN  MONITOR DIETARY SODIUM, CHOL, AND CARBOHYDRATE INTAKE  ENCOURAGE PHYSICAL ACTIVITY  FOOT CARE    ENDOCRINE CONSULT    RV 3 M, SOONER PRN      Recent Results (from the past 672 hour(s))   POCT hemoglobin A1c    Collection Time: 10/11/19 11:35 AM   Result Value Ref Range    Hemoglobin A1C 11 4 (A) 6 5

## 2019-10-11 NOTE — ASSESSMENT & PLAN NOTE
Lab Results   Component Value Date    HGBA1C 11 4 (A) 10/11/2019       COMPLIANT WITH MEDICATION  NOT VERY COMPLIANT WITH DIET  LAST HGB A1C WAS 10 3  DENIES ANY NUMBNESS, TINGLING IN FEET    - DISCUSSED DIETARY MODIFICATION OF CARBOHYDRATES  - HGB A1C AND URINE STUDIES DUE TODAY  - FOOT CARE  - ENDOCRINE CONSULT  - RV 3 MONTHS

## 2019-11-15 ENCOUNTER — OFFICE VISIT (OUTPATIENT)
Dept: FAMILY MEDICINE CLINIC | Facility: CLINIC | Age: 54
End: 2019-11-15
Payer: COMMERCIAL

## 2019-11-15 VITALS
RESPIRATION RATE: 18 BRPM | DIASTOLIC BLOOD PRESSURE: 90 MMHG | OXYGEN SATURATION: 95 % | SYSTOLIC BLOOD PRESSURE: 120 MMHG | TEMPERATURE: 97.7 F | HEART RATE: 101 BPM | WEIGHT: 237 LBS | HEIGHT: 68 IN | BODY MASS INDEX: 35.92 KG/M2

## 2019-11-15 DIAGNOSIS — R06.2 WHEEZING: ICD-10-CM

## 2019-11-15 DIAGNOSIS — J40 BRONCHITIS: Primary | ICD-10-CM

## 2019-11-15 DIAGNOSIS — R06.02 SHORTNESS OF BREATH: ICD-10-CM

## 2019-11-15 PROCEDURE — 99213 OFFICE O/P EST LOW 20 MIN: CPT | Performed by: NURSE PRACTITIONER

## 2019-11-15 RX ORDER — ALBUTEROL SULFATE 2.5 MG/3ML
2.5 SOLUTION RESPIRATORY (INHALATION) EVERY 6 HOURS PRN
Qty: 60 VIAL | Refills: 3 | Status: SHIPPED | OUTPATIENT
Start: 2019-11-15 | End: 2021-06-22 | Stop reason: SDUPTHER

## 2019-11-15 RX ORDER — PREDNISONE 20 MG/1
TABLET ORAL
Qty: 11 TABLET | Refills: 0 | Status: SHIPPED | OUTPATIENT
Start: 2019-11-15 | End: 2020-01-28 | Stop reason: ALTCHOICE

## 2019-11-15 RX ORDER — ALBUTEROL SULFATE 2.5 MG/3ML
2.5 SOLUTION RESPIRATORY (INHALATION) EVERY 6 HOURS PRN
Status: CANCELLED | OUTPATIENT
Start: 2019-11-15

## 2019-11-15 RX ORDER — AZITHROMYCIN 250 MG/1
TABLET, FILM COATED ORAL
Qty: 6 TABLET | Refills: 0 | Status: SHIPPED | OUTPATIENT
Start: 2019-11-15 | End: 2019-11-19

## 2019-11-15 NOTE — PROGRESS NOTES
Assessment/Plan:    1  Bronchitis  -     predniSONE 20 mg tablet; Take 2 tablets PO daily x's 3 days, then take 1 tablet daily x's 3 days, then take 1/2 tablet daily x's 3 days  -     azithromycin (ZITHROMAX) 250 mg tablet; Take 2 tablets PO on day one, then take 1 tablet PO daily x's 4 days  -     albuterol (2 5 mg/3 mL) 0 083 % nebulizer solution; Take 1 vial (2 5 mg total) by nebulization every 6 (six) hours as needed for wheezing or shortness of breath    2  Wheezing  -     albuterol (2 5 mg/3 mL) 0 083 % nebulizer solution; Take 1 vial (2 5 mg total) by nebulization every 6 (six) hours as needed for wheezing or shortness of breath    3  Shortness of breath  -     albuterol (2 5 mg/3 mL) 0 083 % nebulizer solution; Take 1 vial (2 5 mg total) by nebulization every 6 (six) hours as needed for wheezing or shortness of breath      Patient Instructions   Increase fluid intake as tolerated  Rest and humidification   Continue medications as directed   - antibiotic for full course  - pro-biotic to protect stomach while on medication   - Flonase OTC 1-2 sprays each nostril daily PRN post nasal drip   - Mucinex OTC to loosen secretions   Return to office in one week if symptoms persist or worsen        Return in about 1 week (around 11/22/2019), or if symptoms worsen or fail to improve  Subjective:      Patient ID: Judah Patel is a 53 y o  male  Chief Complaint   Patient presents with    URI     cough, mucous        Alexus Gotti is a 53year old male who presents to the office for evaluation of productive cough and chest congestion x's 1 week  Associated symptoms include fatigue, shortness of breath and wheezing  Additionally has ear fullness and mild sinus congestion that is intermittent  Deneis fever or chills  He has been using nebulizer treatment with improvement in wheezing and shortness of breath symtpoms         The following portions of the patient's history were reviewed and updated as appropriate: allergies, current medications, past family history, past medical history, past social history, past surgical history and problem list     Review of Systems   Constitutional: Positive for fatigue  Negative for chills, diaphoresis and fever  HENT: Positive for congestion and sinus pressure  Negative for ear discharge, ear pain, postnasal drip, rhinorrhea, sinus pain and sore throat  Eyes: Negative for pain and discharge  Respiratory: Positive for cough, shortness of breath and wheezing  Negative for chest tightness  Cardiovascular: Negative for chest pain  Gastrointestinal: Negative for diarrhea, nausea and vomiting  Neurological: Negative for dizziness and headaches           Current Outpatient Medications   Medication Sig Dispense Refill    albuterol (PROVENTIL HFA,VENTOLIN HFA) 90 mcg/act inhaler Inhale 2 puffs every 4 (four) hours as needed for wheezing 1 Inhaler 3    amLODIPine (NORVASC) 10 mg tablet Take 1 tablet (10 mg total) by mouth daily 90 tablet 3    Cholecalciferol (VITAMIN D-3) 5000 units TABS Take 1 tablet by mouth daily      cyclobenzaprine (FLEXERIL) 10 mg tablet Take 1 tablet (10 mg total) by mouth 3 (three) times a day as needed for muscle spasms BRAND ONLY PLEASE 45 tablet 1    FARXIGA 10 MG TABS TAKE 1 TABLET BY MOUTH EVERY DAY 90 tablet 3    glipiZIDE (GLUCOTROL XL) 10 mg 24 hr tablet Take 1 tablet (10 mg total) by mouth 2 (two) times a day 90 tablet 3    glucose blood test strip Contour Test Strips      MICROLET LANCETS MIS Microlet Lancet      Mometasone Furoate (ASMANEX HFA) 100 MCG/ACT AERO Inhale 1 puff 2 (two) times a day      pantoprazole (PROTONIX) 40 mg tablet Take 1 tablet by mouth daily      albuterol (2 5 mg/3 mL) 0 083 % nebulizer solution Take 1 vial (2 5 mg total) by nebulization every 6 (six) hours as needed for wheezing or shortness of breath 60 vial 3    azithromycin (ZITHROMAX) 250 mg tablet Take 2 tablets PO on day one, then take 1 tablet PO daily x's 4 days 6 tablet 0    glucose blood test strip Contour Meter      predniSONE 20 mg tablet Take 2 tablets PO daily x's 3 days, then take 1 tablet daily x's 3 days, then take 1/2 tablet daily x's 3 days 11 tablet 0     No current facility-administered medications for this visit  Objective:    /90 (BP Location: Left arm, Patient Position: Sitting, Cuff Size: Extra-Large)   Pulse 101   Temp 97 7 °F (36 5 °C)   Resp 18   Ht 5' 7 5" (1 715 m)   Wt 108 kg (237 lb)   SpO2 95%   BMI 36 57 kg/m²        Physical Exam   Constitutional: He is oriented to person, place, and time  He appears well-developed and well-nourished  No distress  HENT:   Head: Normocephalic and atraumatic  Right Ear: External ear and ear canal normal  No drainage, swelling or tenderness  Tympanic membrane is erythematous  No middle ear effusion  Left Ear: External ear and ear canal normal  No drainage, swelling or tenderness  Tympanic membrane is erythematous  No middle ear effusion  Nose: Mucosal edema and rhinorrhea present  No sinus tenderness  Right sinus exhibits no maxillary sinus tenderness and no frontal sinus tenderness  Left sinus exhibits no maxillary sinus tenderness and no frontal sinus tenderness  Mouth/Throat: Uvula is midline and mucous membranes are normal  No oropharyngeal exudate, posterior oropharyngeal edema or posterior oropharyngeal erythema  Eyes: Conjunctivae are normal  Right eye exhibits no discharge  Left eye exhibits no discharge  Neck: Normal range of motion  Neck supple  No thyromegaly present  Cardiovascular: Normal rate, regular rhythm and normal heart sounds  Pulmonary/Chest: Effort normal  No respiratory distress  He has no decreased breath sounds  He has wheezes in the right upper field  He has no rhonchi  He has no rales  Abdominal: Soft  Bowel sounds are normal  He exhibits no distension  There is no tenderness  There is no rebound     Lymphadenopathy:     He has no cervical adenopathy  Neurological: He is alert and oriented to person, place, and time  Skin: Skin is warm and dry  No rash noted  He is not diaphoretic  Psychiatric: He has a normal mood and affect   His behavior is normal  Thought content normal          ERIC Song

## 2019-11-20 ENCOUNTER — TRANSCRIBE ORDERS (OUTPATIENT)
Dept: ADMINISTRATIVE | Facility: HOSPITAL | Age: 54
End: 2019-11-20

## 2019-11-20 DIAGNOSIS — E04.1 THYROID NODULE: Primary | ICD-10-CM

## 2019-11-21 ENCOUNTER — HOSPITAL ENCOUNTER (OUTPATIENT)
Dept: RADIOLOGY | Facility: CLINIC | Age: 54
Discharge: HOME | End: 2019-11-21
Payer: COMMERCIAL

## 2019-11-21 DIAGNOSIS — E04.1 THYROID NODULE: ICD-10-CM

## 2019-11-21 PROCEDURE — 76536 US EXAM OF HEAD AND NECK: CPT

## 2019-11-22 DIAGNOSIS — I10 ESSENTIAL HYPERTENSION: ICD-10-CM

## 2019-11-22 RX ORDER — AMLODIPINE BESYLATE 10 MG/1
TABLET ORAL
Qty: 90 TABLET | Refills: 3 | Status: SHIPPED | OUTPATIENT
Start: 2019-11-22 | End: 2020-12-13

## 2019-12-16 DIAGNOSIS — M62.830 BACK SPASM: ICD-10-CM

## 2019-12-16 RX ORDER — CYCLOBENZAPRINE HCL 10 MG
10 TABLET ORAL 3 TIMES DAILY PRN
Qty: 45 TABLET | Refills: 1 | Status: SHIPPED | OUTPATIENT
Start: 2019-12-16

## 2020-01-01 DIAGNOSIS — E11.9 TYPE 2 DIABETES MELLITUS WITHOUT COMPLICATION, WITHOUT LONG-TERM CURRENT USE OF INSULIN (HCC): ICD-10-CM

## 2020-01-01 RX ORDER — GLIPIZIDE 10 MG/1
TABLET, FILM COATED, EXTENDED RELEASE ORAL
Qty: 90 TABLET | Refills: 0 | Status: SHIPPED | OUTPATIENT
Start: 2020-01-01 | End: 2020-03-24

## 2020-01-22 LAB
LEFT EYE DIABETIC RETINOPATHY: NORMAL
RIGHT EYE DIABETIC RETINOPATHY: NORMAL

## 2020-01-22 PROCEDURE — 2023F DILAT RTA XM W/O RTNOPTHY: CPT | Performed by: FAMILY MEDICINE

## 2020-01-28 ENCOUNTER — OFFICE VISIT (OUTPATIENT)
Dept: FAMILY MEDICINE CLINIC | Facility: CLINIC | Age: 55
End: 2020-01-28
Payer: COMMERCIAL

## 2020-01-28 VITALS
DIASTOLIC BLOOD PRESSURE: 84 MMHG | WEIGHT: 227.2 LBS | OXYGEN SATURATION: 96 % | TEMPERATURE: 98.2 F | BODY MASS INDEX: 34.43 KG/M2 | HEART RATE: 101 BPM | HEIGHT: 68 IN | RESPIRATION RATE: 16 BRPM | SYSTOLIC BLOOD PRESSURE: 116 MMHG

## 2020-01-28 DIAGNOSIS — J45.20 MILD INTERMITTENT ASTHMA WITHOUT COMPLICATION: ICD-10-CM

## 2020-01-28 DIAGNOSIS — K21.00 GASTRO-ESOPHAGEAL REFLUX DISEASE WITH ESOPHAGITIS: ICD-10-CM

## 2020-01-28 DIAGNOSIS — I10 ESSENTIAL HYPERTENSION: ICD-10-CM

## 2020-01-28 DIAGNOSIS — E11.9 TYPE 2 DIABETES MELLITUS WITHOUT COMPLICATION, WITHOUT LONG-TERM CURRENT USE OF INSULIN (HCC): Primary | ICD-10-CM

## 2020-01-28 LAB — SL AMB POCT HEMOGLOBIN AIC: 9.2 (ref ?–6.5)

## 2020-01-28 PROCEDURE — 83036 HEMOGLOBIN GLYCOSYLATED A1C: CPT | Performed by: FAMILY MEDICINE

## 2020-01-28 PROCEDURE — 3074F SYST BP LT 130 MM HG: CPT | Performed by: FAMILY MEDICINE

## 2020-01-28 PROCEDURE — 3046F HEMOGLOBIN A1C LEVEL >9.0%: CPT | Performed by: FAMILY MEDICINE

## 2020-01-28 PROCEDURE — 99214 OFFICE O/P EST MOD 30 MIN: CPT | Performed by: FAMILY MEDICINE

## 2020-01-28 PROCEDURE — 3079F DIAST BP 80-89 MM HG: CPT | Performed by: FAMILY MEDICINE

## 2020-01-28 PROCEDURE — 3008F BODY MASS INDEX DOCD: CPT | Performed by: FAMILY MEDICINE

## 2020-01-28 RX ORDER — DULAGLUTIDE 1.5 MG/.5ML
INJECTION, SOLUTION SUBCUTANEOUS
COMMUNITY
Start: 2020-01-11 | End: 2021-09-30 | Stop reason: HOSPADM

## 2020-01-28 NOTE — PATIENT INSTRUCTIONS
CONTINUE CURRENT TREATMENT PLAN  MONITOR DIETARY SODIUM, CHOL, AND CARBOHYDRATE INTAKE  ENCOURAGE PHYSICAL ACTIVITY  FOOT CARE    RV 6 M, SOONER PRN

## 2020-01-28 NOTE — PROGRESS NOTES
BMI Counseling: Body mass index is 35 06 kg/m²  The BMI is above normal  Nutrition recommendations include encouraging healthy choices of fruits and vegetables and moderation in carbohydrate intake  Exercise recommendations include exercising 3-5 times per week  No pharmacotherapy was ordered         Assessment/Plan:    Gastro-esophageal reflux disease with esophagitis  STABLE  DENIES ANY CP, INDIGESTION, OR COUGH  NOTES NO MELENA OR HEMATOCHEZIA  NO HEMATEMESIS  COMPLIANT WITH MEDICATION  NO CONCERNS    - CONTINUE CURRENT TREATMENT PLAN  - MEDICATION AS PRESCRIBED  - AVOID CAFFEINE, ALCOHOL OR SMOKING      Type 2 diabetes mellitus without complication, without long-term current use of insulin (Formerly [facility])    Lab Results   Component Value Date    HGBA1C 9 2 (A) 01/28/2020     COMPLIANT WITH MEDICATION  DENIES ANY NUMBNESS, TINGLING IN FEET    - DISCUSSED DIETARY MODIFICATION OF CARBOHYDRATES  - HGB A1C AND URINE STUDIES DUE TODAY  - FOOT CARE  - RV 6 MONTHS        Essential hypertension  STABLE  DENIES ANY CP, SOB, PALPITATIONS, OR HEADACHE  NOTES NO WATER RETENTION  COMPLIANT WITH MEDICATION  NO CONCERNS    - CONTINUE CURRENT TREATMENT PLAN  - MONITOR DIETARY SODIUM INTAKE  - ENCOURAGE PHYSICAL ACTIVITY  - RV 6 MONTHS      Mild intermittent asthma without complication  STABLE       Diagnoses and all orders for this visit:    Type 2 diabetes mellitus without complication, without long-term current use of insulin (Formerly [facility])  -     POCT hemoglobin A1c  -     Microalbumin,Urine    Gastro-esophageal reflux disease with esophagitis    Mild intermittent asthma without complication    Essential hypertension    Other orders  -     TRULICITY 1 5 /9 2KW SOPN          Patient Instructions   CONTINUE CURRENT TREATMENT PLAN  MONITOR DIETARY SODIUM, CHOL, AND CARBOHYDRATE INTAKE  ENCOURAGE PHYSICAL ACTIVITY  FOOT CARE    RV 6 M, SOONER PRN        Return in about 6 months (around 7/28/2020) for Annual physical     Subjective:      Patient ID: Dudley Baker is a 63 y o  male  Chief Complaint   Patient presents with    Diabetes       PATIENT RETURNS FOR ROUTINE EVALUATION OF PATIENT'S MEDICAL ISSUES    INDIVIDUAL MEDICAL ISSUES WITH THEIR CURRENT STATUS, ASSESSMENT AND PLANS ARE LISTED ABOVE          The following portions of the patient's history were reviewed and updated as appropriate: allergies, current medications, past family history, past medical history, past social history, past surgical history and problem list     Review of Systems   Constitutional: Negative for chills, fatigue and fever  HENT: Negative for congestion, ear discharge, ear pain, mouth sores, postnasal drip, sore throat and trouble swallowing  Eyes: Negative for pain, discharge and visual disturbance  Respiratory: Negative for cough, chest tightness, shortness of breath and wheezing  Cardiovascular: Negative for chest pain, palpitations and leg swelling  Gastrointestinal: Negative for abdominal distention, abdominal pain, blood in stool, diarrhea, nausea and vomiting  Endocrine: Negative for polydipsia, polyphagia and polyuria  Genitourinary: Negative for dysuria, frequency, hematuria and urgency  Musculoskeletal: Negative for arthralgias, gait problem and joint swelling  Skin: Negative for pallor and rash  Neurological: Negative for dizziness, syncope, speech difficulty, weakness, light-headedness, numbness and headaches  Hematological: Negative for adenopathy  Psychiatric/Behavioral: Negative for behavioral problems, confusion and sleep disturbance  The patient is not nervous/anxious            Current Outpatient Medications   Medication Sig Dispense Refill    albuterol (2 5 mg/3 mL) 0 083 % nebulizer solution Take 1 vial (2 5 mg total) by nebulization every 6 (six) hours as needed for wheezing or shortness of breath 60 vial 3    albuterol (PROVENTIL HFA,VENTOLIN HFA) 90 mcg/act inhaler Inhale 2 puffs every 4 (four) hours as needed for wheezing 1 Inhaler 3    amLODIPine (NORVASC) 10 mg tablet TAKE 1 TABLET BY MOUTH EVERY DAY 90 tablet 3    Cholecalciferol (VITAMIN D-3) 5000 units TABS Take 1 tablet by mouth daily      cyclobenzaprine (FLEXERIL) 10 mg tablet Take 1 tablet (10 mg total) by mouth 3 (three) times a day as needed for muscle spasms BRAND ONLY PLEASE 45 tablet 1    FARXIGA 10 MG TABS TAKE 1 TABLET BY MOUTH EVERY DAY 90 tablet 3    glucose blood test strip Contour Meter      glucose blood test strip Contour Test Strips      MICROLET LANCETS MISC Microlet Lancet      Mometasone Furoate (ASMANEX HFA) 100 MCG/ACT AERO Inhale 1 puff 2 (two) times a day      pantoprazole (PROTONIX) 40 mg tablet Take 1 tablet by mouth daily      TRULICITY 1 5 NG/2 3DP SOPN       glipiZIDE (GLUCOTROL XL) 10 mg 24 hr tablet TAKE 1 TABLET BY MOUTH EVERY DAY 90 tablet 0     No current facility-administered medications for this visit  Objective:    /84   Pulse 101   Temp 98 2 °F (36 8 °C) (Temporal)   Resp 16   Ht 5' 7 5" (1 715 m)   Wt 103 kg (227 lb 3 2 oz)   SpO2 96%   BMI 35 06 kg/m²        Physical Exam   Constitutional: He is oriented to person, place, and time  He appears well-developed and well-nourished  HENT:   Head: Normocephalic and atraumatic  Eyes: Pupils are equal, round, and reactive to light  Conjunctivae and EOM are normal  Right eye exhibits no discharge  Left eye exhibits no discharge  Neck: Neck supple  No JVD present  No thyromegaly present  Cardiovascular: Normal rate, regular rhythm and normal heart sounds  Pulses are no weak pulses  No murmur heard  Pulses:       Dorsalis pedis pulses are 2+ on the right side, and 2+ on the left side  Posterior tibial pulses are 2+ on the right side, and 2+ on the left side  Pulmonary/Chest: Effort normal and breath sounds normal  He has no wheezes  He has no rales  Abdominal: Soft  Bowel sounds are normal  He exhibits no mass   There is no hepatosplenomegaly  There is no tenderness  There is no rebound, no guarding and no CVA tenderness  MILDLY OBESE   Musculoskeletal: Normal range of motion  He exhibits no edema, tenderness or deformity  Feet:   Right Foot:   Skin Integrity: Negative for ulcer, skin breakdown, erythema, warmth, callus or dry skin  Left Foot:   Skin Integrity: Negative for ulcer, skin breakdown, erythema, warmth, callus or dry skin  Lymphadenopathy:     He has no cervical adenopathy  He has no axillary adenopathy  Neurological: He is alert and oriented to person, place, and time  Skin: Skin is warm and dry  No rash noted  No erythema  Psychiatric: He has a normal mood and affect  His behavior is normal  Judgment and thought content normal           Patient's shoes and socks removed  Right Foot/Ankle   Right Foot Inspection  Skin Exam: skin normal and skin intact no dry skin, no warmth, no callus, no erythema, no maceration, no abnormal color, no pre-ulcer, no ulcer and no callus                          Toe Exam: ROM and strength within normal limits  Sensory       Monofilament testing: intact  Vascular    The right DP pulse is 2+  The right PT pulse is 2+  Left Foot/Ankle  Left Foot Inspection  Skin Exam: skin normal and skin intactno dry skin, no warmth, no erythema, no maceration, normal color, no pre-ulcer, no ulcer and no callus                         Toe Exam: ROM and strength within normal limits                   Sensory       Monofilament: intact  Vascular    The left DP pulse is 2+  The left PT pulse is 2+  Assign Risk Category:  No deformity present; No loss of protective sensation;  No weak pulses       Risk: 0      Sue Keith MD

## 2020-01-29 LAB — MICROALBUMIN UR-MCNC: 11.1 MG/DL

## 2020-03-16 ENCOUNTER — TELEPHONE (OUTPATIENT)
Dept: FAMILY MEDICINE CLINIC | Facility: CLINIC | Age: 55
End: 2020-03-16

## 2020-03-23 DIAGNOSIS — E11.9 TYPE 2 DIABETES MELLITUS WITHOUT COMPLICATION, WITHOUT LONG-TERM CURRENT USE OF INSULIN (HCC): ICD-10-CM

## 2020-03-24 RX ORDER — GLIPIZIDE 10 MG/1
TABLET, FILM COATED, EXTENDED RELEASE ORAL
Qty: 90 TABLET | Refills: 0 | Status: SHIPPED | OUTPATIENT
Start: 2020-03-24 | End: 2020-06-15

## 2020-05-18 DIAGNOSIS — E11.9 TYPE 2 DIABETES MELLITUS WITHOUT COMPLICATION, WITHOUT LONG-TERM CURRENT USE OF INSULIN (HCC): ICD-10-CM

## 2020-05-18 RX ORDER — DAPAGLIFLOZIN 10 MG/1
TABLET, FILM COATED ORAL
Qty: 90 TABLET | Refills: 3 | Status: SHIPPED | OUTPATIENT
Start: 2020-05-18 | End: 2021-06-03

## 2020-06-15 DIAGNOSIS — E11.9 TYPE 2 DIABETES MELLITUS WITHOUT COMPLICATION, WITHOUT LONG-TERM CURRENT USE OF INSULIN (HCC): ICD-10-CM

## 2020-06-15 RX ORDER — GLIPIZIDE 10 MG/1
TABLET, FILM COATED, EXTENDED RELEASE ORAL
Qty: 90 TABLET | Refills: 0 | Status: SHIPPED | OUTPATIENT
Start: 2020-06-15 | End: 2020-09-11

## 2020-08-05 ENCOUNTER — TELEPHONE (OUTPATIENT)
Dept: FAMILY MEDICINE CLINIC | Facility: CLINIC | Age: 55
End: 2020-08-05

## 2020-08-05 ENCOUNTER — APPOINTMENT (OUTPATIENT)
Dept: RADIOLOGY | Facility: CLINIC | Age: 55
End: 2020-08-05
Payer: COMMERCIAL

## 2020-08-05 DIAGNOSIS — M54.2 NECK PAIN: ICD-10-CM

## 2020-08-05 DIAGNOSIS — M54.2 NECK PAIN: Primary | ICD-10-CM

## 2020-08-05 PROCEDURE — 72050 X-RAY EXAM NECK SPINE 4/5VWS: CPT

## 2020-08-05 NOTE — TELEPHONE ENCOUNTER
Radha Adams states he saw you over the weekend and asked for a xray of his neck  He wants to use [facility]  Please place order  Thanks  Call when ready    [phone]

## 2020-08-08 ENCOUNTER — DOCUMENTATION (OUTPATIENT)
Dept: FAMILY MEDICINE CLINIC | Facility: CLINIC | Age: 55
End: 2020-08-08

## 2020-08-08 DIAGNOSIS — M54.12 CERVICAL RADICULOPATHY: Primary | ICD-10-CM

## 2020-08-08 RX ORDER — PREDNISONE 20 MG/1
TABLET ORAL
Qty: 14 TABLET | Refills: 0 | Status: SHIPPED | OUTPATIENT
Start: 2020-08-08 | End: 2020-09-15 | Stop reason: ALTCHOICE

## 2020-08-11 DIAGNOSIS — M54.2 NECK PAIN: Primary | ICD-10-CM

## 2020-08-17 ENCOUNTER — EVALUATION (OUTPATIENT)
Dept: PHYSICAL THERAPY | Facility: CLINIC | Age: 55
End: 2020-08-17
Payer: COMMERCIAL

## 2020-08-17 DIAGNOSIS — M54.2 NECK PAIN: ICD-10-CM

## 2020-08-17 PROCEDURE — 97110 THERAPEUTIC EXERCISES: CPT

## 2020-08-17 PROCEDURE — 97161 PT EVAL LOW COMPLEX 20 MIN: CPT

## 2020-08-17 NOTE — PROGRESS NOTES
PT Evaluation     Today's date: 2020  Patient name: Poonam Oglesby  : 1965  MRN: [id number]  Referring provider: ERIC Dorantes  Dx:   Encounter Diagnosis     ICD-10-CM    1  Neck pain  M54 2 Ambulatory referral to Physical Therapy       Start Time: 1300  Stop Time: 1345  Total time in clinic (min): 45 minutes    Assessment  Assessment details: Poonam Oglesby is a 63 y o  male who presents with pain, decreased strength, decreased ROM, decreased joint mobility, ambulatory dysfunction and postural  dysfunction  Due to these impairments, patient has difficulty performing a/iadls, recreational activities, work-related activities and engaging in social activities  Patient's clinical presentation is consistent with their referring diagnosis of Neck pain  Patient has been educated in home exercise program and plan of care  Patient would benefit from skilled physical therapy services to address their aforementioned functional limitations and progress towards prior level of function and independence with home exercise program    Impairments: abnormal muscle firing, abnormal or restricted ROM, activity intolerance, impaired physical strength, lacks appropriate home exercise program and pain with function  Understanding of Dx/Px/POC: good   Prognosis: good    Goals  Short Term Goals: Target Date 10/17/20  1  Pt will initiate and advance HEP  2  Pt will demonstrate improved seated posture  3  Pt will demonstrate directional preference  4  Pt will report centralized symptoms  Long Term Goals: Target Date 20  1  Pt will demonstrate independence in HEP  2  Pt will demonstrate full AROM in c/s  3  Pt will report abolished symptoms  4  Pt will report return to sleep without limitations         Plan  Patient would benefit from: skilled PT  Planned modality interventions: cryotherapy, electrical stimulation/Russian stimulation and thermotherapy: hydrocollator packs  Planned therapy interventions: joint mobilization, manual therapy, patient education, postural training, activity modification, abdominal trunk stabilization, body mechanics training, flexibility, functional ROM exercises, graded exercise, home exercise program, neuromuscular re-education, strengthening, stretching, therapeutic activities, therapeutic exercise, motor coordination training, muscle pump exercises, gait training, balance/weight bearing training, ADL training and breathing training  Frequency: 2x week  Duration in weeks: 8  Treatment plan discussed with: patient        Subjective Evaluation    History of Present Illness  Mechanism of injury: Pt reports initial c/s pain in high school from playing football  Pt reports he has been managing pain off and on over the years with flexoral throughout the years  Pt reports several months ago he was prescribed medication, without relieif  Pt reports he went back to [facility], and has currently been prescribed prednisone  Pt also received a radiograph and referred to [facility]  Pt reports pain increases with sitting at desk and sleeping  Pt reports lamont relief with steroid and decompression pillow             Recurrent probem    Quality of life: excellent    Pain  Current pain ratin  At best pain ratin  At worst pain ratin  Location: c/s to R hand  Quality: radiating and pressure (tingling )  Relieving factors: change in position, medications and relaxation  Aggravating factors: sitting and keyboarding  Progression: no change    Social Support    Employment status: working (clinical  )  Hand dominance: right  Exercise history: pool / swim      Diagnostic Tests  X-ray: abnormal  Patient Goals  Patient goals for therapy: decreased pain and return to work  Patient goal: get comfortable         Objective     Concurrent Complaints  Negative for headaches    Postural Observations  Seated posture: poor  Standing posture: poor  Correction of posture: makes symptoms better        Palpation     Right   Hypertonic in the cervical paraspinals, levator scapulae, lower trapezius, pectoralis minor, scalenes, suboccipitals and upper trapezius  Tenderness   Cervical Spine   No tenderness in the spinous process  Right Shoulder  No tenderness in the clavicle  Neurological Testing     Sensation   Cervical/Thoracic   Left   Intact: light touch    Right   Intact: light touch    Joint Play     Hypomobile: C3, C4, C5, C6, C7, T1 and 1st rib   Mechanical Assessment    Cervical      Thoracic    Seated extension: repeated movements  Pain location: peripheralized  Lying extension: repeated movements  Pain location: centralized    Lumbar      Strength/Myotome Testing   Cervical Spine     Left   Normal strength    Right   Normal strength    Left Wrist/Hand      (2nd hand position)     Trial 1: 85    Trial 2: 80    Trial 3: 80    Right Wrist/Hand      (2nd hand position)     Trial 1: 105    Trial 2: 100    Trial 3: 100    Tests   Cervical   Positive repeated extension and neck flexor muscle endurance test   Negative alar ligament test, transverse ligament test and VBI  Right   Positive Spurling's Test A and Spurling's Test B       General Comments:    Upper quarter screen   Shoulder: unremarkable  Elbow: unremarkable  Hand/wrist: unremarkable  Neuro Exam:     Headaches   Patient reports headaches: No        Flowsheet Rows      Most Recent Value   PT/OT G-Codes   Current Score  60   Projected Score  68             Precautions: DM      Manuals 8/17                                                                Neuro Re-Ed             Posture re-ed                                                                                           Ther Ex             C/s retraction 10x            Scap Retraction 10x5"                                                                                          Ther Activity                                       Gait Training Modalities

## 2020-08-20 ENCOUNTER — OFFICE VISIT (OUTPATIENT)
Dept: PHYSICAL THERAPY | Facility: CLINIC | Age: 55
End: 2020-08-20
Payer: COMMERCIAL

## 2020-08-20 DIAGNOSIS — M54.2 NECK PAIN: Primary | ICD-10-CM

## 2020-08-20 PROCEDURE — 97110 THERAPEUTIC EXERCISES: CPT

## 2020-08-20 PROCEDURE — 97140 MANUAL THERAPY 1/> REGIONS: CPT

## 2020-08-20 NOTE — PROGRESS NOTES
Daily Note     Today's date: 2020  Patient name: Poonam Oglesby  : 1965  MRN: [id number]  Referring provider: ERIC Dorantes  Dx:   Encounter Diagnosis     ICD-10-CM    1  Neck pain  M54 2        Start Time: 1345  Stop Time: 1430  Total time in clinic (min): 45 minutes    Subjective: Pt reports symptoms have significantly improved, reports used his traction pillow x2, performed posture correction and HEP which has helped manage symptoms  Pt reports today was the last day of his steroid  Objective: See treatment diary below      Assessment: Pt re-educated on HEP, reports no provoked symptoms throughout treatment session  Significant soft tissue restriction at scalene on R  Plan: Continue per plan of care  Progress treatment as tolerated         Precautions: DM      Manuals            Mob  1st rib, T/S           Suboccipital release  2 min           C/s traction  2 min                        Neuro Re-Ed             Posture re-ed                                                                                           Ther Ex             C/s retraction 10x 10x           Scap Retraction 10x5" Prone 20x5"           Supine chin tuck  10x5"           Chin tuck head lift  5x10"           No Money  10x3"           Scalene Stretch   3x:30           Corner Stretch  3x:30           Rows  2x10           Sh Ext  2x10                        Ther Activity             Gait Training                                       Modalities                                           [id number]

## 2020-09-01 ENCOUNTER — TELEPHONE (OUTPATIENT)
Dept: FAMILY MEDICINE CLINIC | Facility: CLINIC | Age: 55
End: 2020-09-01

## 2020-09-01 DIAGNOSIS — E11.9 TYPE 2 DIABETES MELLITUS WITHOUT COMPLICATION, WITHOUT LONG-TERM CURRENT USE OF INSULIN (HCC): ICD-10-CM

## 2020-09-01 DIAGNOSIS — Z11.59 ENCOUNTER FOR HEPATITIS C SCREENING TEST FOR LOW RISK PATIENT: ICD-10-CM

## 2020-09-01 DIAGNOSIS — I10 ESSENTIAL HYPERTENSION: ICD-10-CM

## 2020-09-01 DIAGNOSIS — Z13.29 SCREENING FOR HYPOTHYROIDISM: ICD-10-CM

## 2020-09-01 DIAGNOSIS — Z00.00 PREVENTATIVE HEALTH CARE: Primary | ICD-10-CM

## 2020-09-01 DIAGNOSIS — Z13.220 SCREENING FOR HYPERLIPIDEMIA: ICD-10-CM

## 2020-09-01 DIAGNOSIS — Z11.4 SCREENING FOR HIV (HUMAN IMMUNODEFICIENCY VIRUS): ICD-10-CM

## 2020-09-01 DIAGNOSIS — Z12.5 SCREENING FOR PROSTATE CANCER: ICD-10-CM

## 2020-09-01 DIAGNOSIS — Z12.5 ENCOUNTER FOR PROSTATE CANCER SCREENING: ICD-10-CM

## 2020-09-03 ENCOUNTER — APPOINTMENT (OUTPATIENT)
Dept: PHYSICAL THERAPY | Facility: CLINIC | Age: 55
End: 2020-09-03
Payer: COMMERCIAL

## 2020-09-11 DIAGNOSIS — E11.9 TYPE 2 DIABETES MELLITUS WITHOUT COMPLICATION, WITHOUT LONG-TERM CURRENT USE OF INSULIN (HCC): ICD-10-CM

## 2020-09-11 RX ORDER — GLIPIZIDE 10 MG/1
TABLET, FILM COATED, EXTENDED RELEASE ORAL
Qty: 90 TABLET | Refills: 0 | Status: SHIPPED | OUTPATIENT
Start: 2020-09-11 | End: 2020-12-13

## 2020-09-12 LAB
ALBUMIN SERPL-MCNC: 4.1 G/DL (ref 3.6–5.1)
ALBUMIN/GLOB SERPL: 1.3 (CALC) (ref 1–2.5)
ALP SERPL-CCNC: 103 U/L (ref 35–144)
ALT SERPL-CCNC: 22 U/L (ref 9–46)
AST SERPL-CCNC: 16 U/L (ref 10–35)
BASOPHILS # BLD AUTO: 31 CELLS/UL (ref 0–200)
BASOPHILS NFR BLD AUTO: 0.7 %
BILIRUB SERPL-MCNC: 0.4 MG/DL (ref 0.2–1.2)
BUN SERPL-MCNC: 13 MG/DL (ref 7–25)
BUN/CREAT SERPL: ABNORMAL (CALC) (ref 6–22)
CALCIUM SERPL-MCNC: 9.4 MG/DL (ref 8.6–10.3)
CHLORIDE SERPL-SCNC: 103 MMOL/L (ref 98–110)
CHOLEST SERPL-MCNC: 268 MG/DL
CHOLEST/HDLC SERPL: 5.5 (CALC)
CO2 SERPL-SCNC: 26 MMOL/L (ref 20–32)
CREAT SERPL-MCNC: 0.81 MG/DL (ref 0.7–1.33)
EOSINOPHIL # BLD AUTO: 172 CELLS/UL (ref 15–500)
EOSINOPHIL NFR BLD AUTO: 3.9 %
ERYTHROCYTE [DISTWIDTH] IN BLOOD BY AUTOMATED COUNT: 14.4 % (ref 11–15)
GLOBULIN SER CALC-MCNC: 3.2 G/DL (CALC) (ref 1.9–3.7)
GLUCOSE SERPL-MCNC: 200 MG/DL (ref 65–99)
HCT VFR BLD AUTO: 54 % (ref 38.5–50)
HCV AB S/CO SERPL IA: 0.01
HCV AB SERPL QL IA: NORMAL
HDLC SERPL-MCNC: 49 MG/DL
HGB BLD-MCNC: 17.6 G/DL (ref 13.2–17.1)
HIV 1+2 AB+HIV1 P24 AG SERPL QL IA: NORMAL
LDLC SERPL CALC-MCNC: 163 MG/DL (CALC)
LYMPHOCYTES # BLD AUTO: 1747 CELLS/UL (ref 850–3900)
LYMPHOCYTES NFR BLD AUTO: 39.7 %
MCH RBC QN AUTO: 26.4 PG (ref 27–33)
MCHC RBC AUTO-ENTMCNC: 32.6 G/DL (ref 32–36)
MCV RBC AUTO: 81 FL (ref 80–100)
MONOCYTES # BLD AUTO: 392 CELLS/UL (ref 200–950)
MONOCYTES NFR BLD AUTO: 8.9 %
NEUTROPHILS # BLD AUTO: 2059 CELLS/UL (ref 1500–7800)
NEUTROPHILS NFR BLD AUTO: 46.8 %
NONHDLC SERPL-MCNC: 219 MG/DL (CALC)
PLATELET # BLD AUTO: 325 THOUSAND/UL (ref 140–400)
PMV BLD REES-ECKER: 9.2 FL (ref 7.5–12.5)
POTASSIUM SERPL-SCNC: 4.4 MMOL/L (ref 3.5–5.3)
PROT SERPL-MCNC: 7.3 G/DL (ref 6.1–8.1)
PSA SERPL-MCNC: 1 NG/ML
RBC # BLD AUTO: 6.67 MILLION/UL (ref 4.2–5.8)
SL AMB EGFR AFRICAN AMERICAN: 115 ML/MIN/1.73M2
SL AMB EGFR NON AFRICAN AMERICAN: 99 ML/MIN/1.73M2
SODIUM SERPL-SCNC: 139 MMOL/L (ref 135–146)
TRIGL SERPL-MCNC: 362 MG/DL
TSH SERPL-ACNC: 1.64 MIU/L (ref 0.4–4.5)
WBC # BLD AUTO: 4.4 THOUSAND/UL (ref 3.8–10.8)

## 2020-09-15 ENCOUNTER — OFFICE VISIT (OUTPATIENT)
Dept: FAMILY MEDICINE CLINIC | Facility: CLINIC | Age: 55
End: 2020-09-15
Payer: COMMERCIAL

## 2020-09-15 VITALS
WEIGHT: 224 LBS | RESPIRATION RATE: 18 BRPM | OXYGEN SATURATION: 96 % | HEIGHT: 67 IN | BODY MASS INDEX: 35.16 KG/M2 | DIASTOLIC BLOOD PRESSURE: 70 MMHG | SYSTOLIC BLOOD PRESSURE: 138 MMHG | TEMPERATURE: 98.6 F | HEART RATE: 93 BPM

## 2020-09-15 DIAGNOSIS — Z23 NEED FOR INFLUENZA VACCINATION: ICD-10-CM

## 2020-09-15 DIAGNOSIS — I10 ESSENTIAL HYPERTENSION: ICD-10-CM

## 2020-09-15 DIAGNOSIS — E11.9 TYPE 2 DIABETES MELLITUS WITHOUT COMPLICATION, WITHOUT LONG-TERM CURRENT USE OF INSULIN (HCC): ICD-10-CM

## 2020-09-15 DIAGNOSIS — Z00.00 ENCOUNTER FOR ANNUAL GENERAL MEDICAL EXAMINATION WITHOUT ABNORMAL FINDINGS IN ADULT: Primary | ICD-10-CM

## 2020-09-15 DIAGNOSIS — E78.2 MIXED HYPERLIPIDEMIA: ICD-10-CM

## 2020-09-15 LAB
ECG INTERP DURING EX: NORMAL MS
SL AMB  POCT GLUCOSE, UA: >1000
SL AMB LEUKOCYTE ESTERASE,UA: 0
SL AMB POCT BILIRUBIN,UA: 0
SL AMB POCT BLOOD,UA: 0
SL AMB POCT CLARITY,UA: CLEAR
SL AMB POCT COLOR,UA: YELLOW
SL AMB POCT HEMOGLOBIN AIC: 11.7 (ref ?–6.5)
SL AMB POCT KETONES,UA: 0
SL AMB POCT NITRITE,UA: 0
SL AMB POCT PH,UA: 6
SL AMB POCT SPECIFIC GRAVITY,UA: 1.01
SL AMB POCT URINE PROTEIN: 0
SL AMB POCT UROBILINOGEN: 0

## 2020-09-15 PROCEDURE — 93000 ELECTROCARDIOGRAM COMPLETE: CPT | Performed by: NURSE PRACTITIONER

## 2020-09-15 PROCEDURE — 90471 IMMUNIZATION ADMIN: CPT

## 2020-09-15 PROCEDURE — 81003 URINALYSIS AUTO W/O SCOPE: CPT | Performed by: NURSE PRACTITIONER

## 2020-09-15 PROCEDURE — 90682 RIV4 VACC RECOMBINANT DNA IM: CPT

## 2020-09-15 PROCEDURE — 83036 HEMOGLOBIN GLYCOSYLATED A1C: CPT | Performed by: NURSE PRACTITIONER

## 2020-09-15 PROCEDURE — 99396 PREV VISIT EST AGE 40-64: CPT | Performed by: NURSE PRACTITIONER

## 2020-09-15 RX ORDER — ATORVASTATIN CALCIUM 40 MG/1
40 TABLET, FILM COATED ORAL DAILY
Qty: 90 TABLET | Refills: 3 | Status: SHIPPED | OUTPATIENT
Start: 2020-09-15 | End: 2021-04-13 | Stop reason: SDUPTHER

## 2020-09-15 NOTE — PROGRESS NOTES
FAMILY [facility] HEALTH MAINTENANCE OFFICE VISIT  [facility] - [facility] PHYSICIANS    NAME: Rashawn Vásquez  AGE: 63 y o  SEX: male  : 1965     DATE: 2020    Assessment and Plan     1  Encounter for annual general medical examination without abnormal findings in adult  Comments:  Age appropriate screenings and recommendations discussed  Fasting lab work reviewed  Orders:  -     POCT urine dip auto non-scope  -     influenza vaccine, quadrivalent, recombinant, PF, 0 5 mL, for patients 18 yr+ (FLUBLOK)    2  Type 2 diabetes mellitus without complication, without long-term current use of insulin (HCC)  Assessment & Plan:  Previous A1c 9 1  Pt reports A1c goal is 7  Discussed carbohydrate and sugar moderation with strict control of blood sugars moving forward  We discussed his cardiac risk in detail  Pt is following up with Dr Tylre Sepulveda - will fax results  Tolerating medications without issues  Recommend regular follow up with endocrinology as scheduled  Lab Results   Component Value Date    HGBA1C 11 7 (A) 09/15/2020       Orders:  -     atorvastatin (LIPITOR) 40 mg tablet; Take 1 tablet (40 mg total) by mouth daily  -     POCT ECG  -     POCT hemoglobin A1c    3  Mixed hyperlipidemia  Assessment & Plan:  Recommend heart healthy/mediteranean style eating  Discussed nutrition and exercise in detail  Handout provided  Discussed cardiac risk and comorbidity r/t DM II  Pt verbalized understanding  Recheck in 6 months  Orders:  -     atorvastatin (LIPITOR) 40 mg tablet; Take 1 tablet (40 mg total) by mouth daily  -     POCT ECG    4  Essential hypertension  Assessment & Plan:  BP wnl in office today  Sodium reduction, heart healthy nutrition  Medications as directed  Stable  Orders:  -     POCT ECG    5   Need for influenza vaccination  -     influenza vaccine, quadrivalent, recombinant, PF, 0 5 mL, for patients 18 yr+ (FLUBLOK)      · Patient Counseling: · Nutrition: Stressed importance of a well balanced diet, moderation of sodium/saturated fat, caloric balance and sufficient intake of fiber  · Exercise: Stressed the importance of regular exercise with a goal of 150 minutes per week  · Dental Health: Discussed daily flossing and brushing and regular dental visits   · Alcohol Use:  Recommended moderation of alcohol intake  · Injury Prevention: Discussed Safety Belts, Safety Helmets, and Smoke Detectors    · Immunizations reviewed: See Orders and Risks and Benefits discussed  · Discussed benefits of:  Colon Cancer Screening, Prostate Cancer Screening  and Screening labs   BMI Counseling: Body mass index is 35 61 kg/m²  Discussed with patient's BMI with him  The BMI is above normal  Nutrition recommendations include reducing portion sizes, decreasing overall calorie intake, 3-5 servings of fruits/vegetables daily, reducing fast food intake, consuming healthier snacks, decreasing soda and/or juice intake, moderation in carbohydrate intake, increasing intake of lean protein, reducing intake of saturated fat and trans fat and reducing intake of cholesterol  Exercise recommendations include moderate aerobic physical activity for 150 minutes/week and exercising 3-5 times per week  No follow-ups on file  Chief Complaint     Chief Complaint   Patient presents with    Physical Exam       History of Present Illness     HPI    Well Adult Physical   Patient here for a comprehensive physical exam       Diet and Physical Activity  Diet: poor diet  Exercise: never       General Health  Hearing: Normal:  bilateral  Vision: goes for regular eye exams and wears contacts  Dental: regular dental visits    Reproductive Health  No issues FOLLOWS UP WITH UROLOGY, PER PT        The following portions of the patient's history were reviewed and updated as appropriate: allergies, current medications, past family history, past medical history, past social history, past surgical history and problem list     Review of Systems     Review of Systems   Constitutional: Negative for diaphoresis, fatigue and fever  HENT: Negative for ear pain and hearing loss  Eyes: Negative for pain and visual disturbance  Respiratory: Negative for chest tightness and shortness of breath  Cardiovascular: Negative for chest pain, palpitations and leg swelling  Gastrointestinal: Negative for abdominal pain, constipation and diarrhea  Genitourinary: Negative for difficulty urinating  Musculoskeletal: Negative for arthralgias and myalgias  Skin: Negative for rash  Neurological: Negative for dizziness, numbness and headaches  Psychiatric/Behavioral: Negative for sleep disturbance  Past Medical History     Past Medical History:   Diagnosis Date    Asthma     Diabetes mellitus ([facility] [address] )     Hypertension        Past Surgical History     History reviewed  No pertinent surgical history      Social History     Social History     Socioeconomic History    Marital status: /Civil Union     Spouse name: None    Number of children: None    Years of education: None    Highest education level: None   Occupational History    None   Social Needs    Financial resource strain: None    Food insecurity     Worry: None     Inability: None    Transportation needs     Medical: None     Non-medical: None   Tobacco Use    Smoking status: Former Smoker     Packs/day: 0 25    Smokeless tobacco: Never Used    Tobacco comment: current some day smoker and former smoker as per [employer]   Substance and Sexual Activity    Alcohol use: Yes     Frequency: Monthly or less     Drinks per session: 1 or 2     Binge frequency: Never    Drug use: No    Sexual activity: None   Lifestyle    Physical activity     Days per week: None     Minutes per session: None    Stress: None   Relationships    Social connections     Talks on phone: None     Gets together: None     Attends Jain service: None     Active member of club or organization: None     Attends meetings of clubs or organizations: None     Relationship status: None    Intimate partner violence     Fear of current or ex partner: None     Emotionally abused: None     Physically abused: None     Forced sexual activity: None   Other Topics Concern    None   Social History Narrative    None       Family History     Family History   Problem Relation Age of Onset    Breast cancer Mother     Mental illness Neg Hx     Substance Abuse Neg Hx        Current Medications       Current Outpatient Medications:     albuterol (2 5 mg/3 mL) 0 083 % nebulizer solution, Take 1 vial (2 5 mg total) by nebulization every 6 (six) hours as needed for wheezing or shortness of breath, Disp: 60 vial, Rfl: 3    albuterol (PROVENTIL HFA,VENTOLIN HFA) 90 mcg/act inhaler, Inhale 2 puffs every 4 (four) hours as needed for wheezing, Disp: 1 Inhaler, Rfl: 3    amLODIPine (NORVASC) 10 mg tablet, TAKE 1 TABLET BY MOUTH EVERY DAY, Disp: 90 tablet, Rfl: 3    Cholecalciferol (VITAMIN D-3) 5000 units TABS, Take 1 tablet by mouth daily, Disp: , Rfl:     cyclobenzaprine (FLEXERIL) 10 mg tablet, Take 1 tablet (10 mg total) by mouth 3 (three) times a day as needed for muscle spasms BRAND ONLY PLEASE, Disp: 45 tablet, Rfl: 1    FARXIGA 10 MG TABS, TAKE 1 TABLET BY MOUTH EVERY DAY, Disp: 90 tablet, Rfl: 3    glipiZIDE (GLUCOTROL XL) 10 mg 24 hr tablet, TAKE 1 TABLET BY MOUTH EVERY DAY, Disp: 90 tablet, Rfl: 0    glucose blood test strip, Contour Meter, Disp: , Rfl:     glucose blood test strip, Contour Test Strips, Disp: , Rfl:     MICROLET LANCETS MISC, Microlet Lancet, Disp: , Rfl:     Mometasone Furoate (ASMANEX HFA) 100 MCG/ACT AERO, Inhale 1 puff 2 (two) times a day, Disp: , Rfl:     pantoprazole (PROTONIX) 40 mg tablet, Take 1 tablet by mouth daily, Disp: , Rfl:     TRULICITY 1 5 CS/4 8GP SOPN, , Disp: , Rfl:     atorvastatin (LIPITOR) 40 mg tablet, Take 1 tablet (40 mg total) by mouth daily, Disp: 90 tablet, Rfl: 3     Allergies     No Known Allergies    Objective     /70   Pulse 93   Temp 98 6 °F (37 °C) (Temporal)   Resp 18   Ht 5' 6 5" (1 689 m)   Wt 102 kg (224 lb)   SpO2 96%   BMI 35 61 kg/m²      Physical Exam  Constitutional:       Appearance: Normal appearance  He is well-developed  He is obese  HENT:      Head: Normocephalic and atraumatic  Right Ear: Tympanic membrane and external ear normal       Left Ear: Tympanic membrane and external ear normal    Eyes:      General: Lids are normal       Extraocular Movements:      Right eye: Normal extraocular motion and no nystagmus  Left eye: Normal extraocular motion and no nystagmus  Conjunctiva/sclera: Conjunctivae normal       Right eye: Right conjunctiva is not injected  Left eye: Left conjunctiva is not injected  Pupils: Pupils are equal, round, and reactive to light  Pupils are equal       Right eye: Pupil is round and reactive  Left eye: Pupil is round and reactive  Neck:      Musculoskeletal: Normal range of motion and neck supple  Thyroid: No thyromegaly  Vascular: No carotid bruit  Trachea: No tracheal deviation  Cardiovascular:      Rate and Rhythm: Normal rate and regular rhythm  Heart sounds: Normal heart sounds  No murmur  No friction rub  No gallop  Pulmonary:      Effort: Pulmonary effort is normal  No respiratory distress  Breath sounds: Normal breath sounds  No wheezing or rales  Abdominal:      General: Bowel sounds are normal  There is distension (obese)  Palpations: Abdomen is soft  There is no hepatomegaly or splenomegaly  Tenderness: There is no abdominal tenderness  There is no rebound  Hernia: A hernia is present  Musculoskeletal: Normal range of motion  General: No tenderness or deformity  Lymphadenopathy:      Cervical: No cervical adenopathy  Skin:     General: Skin is warm and dry        Findings: No erythema or rash  Neurological:      Mental Status: He is alert and oriented to person, place, and time  Cranial Nerves: No cranial nerve deficit  Coordination: Coordination normal       Deep Tendon Reflexes: Reflexes are normal and symmetric  Psychiatric:         Behavior: Behavior normal          Thought Content:  Thought content normal          Judgment: Judgment normal          Angela Sharma, [facility],[address]

## 2020-09-16 PROBLEM — E78.2 MIXED HYPERLIPIDEMIA: Status: ACTIVE | Noted: 2020-09-16

## 2020-09-16 NOTE — ASSESSMENT & PLAN NOTE
Previous A1c 9 1  Pt reports A1c goal is 7  Discussed carbohydrate and sugar moderation with strict control of blood sugars moving forward  We discussed his cardiac risk in detail  Pt is following up with Dr Moody Hilliard - will fax results  Tolerating medications without issues  Recommend regular follow up with endocrinology as scheduled     Lab Results   Component Value Date    HGBA1C 11 7 (A) 09/15/2020

## 2020-09-16 NOTE — ASSESSMENT & PLAN NOTE
Recommend heart healthy/mediteranean style eating  Discussed nutrition and exercise in detail  Handout provided  Discussed cardiac risk and comorbidity r/t DM II  Pt verbalized understanding  Recheck in 6 months

## 2020-09-17 ENCOUNTER — OFFICE VISIT (OUTPATIENT)
Dept: PHYSICAL THERAPY | Facility: CLINIC | Age: 55
End: 2020-09-17
Payer: COMMERCIAL

## 2020-09-17 DIAGNOSIS — M54.2 NECK PAIN: Primary | ICD-10-CM

## 2020-09-17 PROCEDURE — 97140 MANUAL THERAPY 1/> REGIONS: CPT

## 2020-09-17 PROCEDURE — 97110 THERAPEUTIC EXERCISES: CPT

## 2020-09-17 NOTE — PROGRESS NOTES
PT Discharge    Today's date: 2020  Patient name: Wilber Ledezma  : 1965  MRN: [id number]  Referring provider: ERIC Charles  Dx:   Encounter Diagnosis     ICD-10-CM    1  Neck pain  M54 2        Start Time: 1345  Stop Time: 1430  Total time in clinic (min): 45 minutes    Assessment  Assessment details: Wilber Ledezma has received 3 visits of physical therapy and reports 100 % improvement since initial evaluation  Pt demonstrates improved pain, increased ROM, increased joint mobility and improved posture , and reports improvement with ambulate, I/ADLs, sleep and work  Pt will benefit from continued HEP to progress and maintain functional goals/gains  Understanding of Dx/Px/POC: good   Prognosis: good    Goals  Short Term Goals: Target Date 10/17/20 (achieved)   1  Pt will initiate and advance HEP  2  Pt will demonstrate improved seated posture  3  Pt will demonstrate directional preference  4  Pt will report centralized symptoms  Long Term Goals: Target Date 20 (achieved)   1  Pt will demonstrate independence in HEP  2  Pt will demonstrate full AROM in c/s  3  Pt will report abolished symptoms  4  Pt will report return to sleep without limitations  Plan  Plan details: Pt discharged with HEP   Planned therapy interventions: home exercise program  Treatment plan discussed with: patient        Subjective Evaluation    History of Present Illness  Mechanism of injury: Pt reports initial c/s pain in high school from playing football  Pt reports he has been managing pain off and on over the years with flexoral throughout the years  Pt reports several months ago he was prescribed medication, without relieif  Pt reports he went back to [place], and has currently been prescribed prednisone  Pt also received a radiograph and referred to OPPT  Pt reports pain increases with sitting at desk and sleeping  Pt reports lamont relief with steroid and decompression pillow  20  Pt reports 100% improvement since initial evaluation  Pt reports compliance with postural awareness and reports no pain  Pt reports mostly compliant with HEP  Recurrent probem    Quality of life: excellent    Pain  No pain reported  Current pain ratin  At best pain ratin  At worst pain ratin  Progression: resolved    Social Support    Employment status: working (clinical  )  Hand dominance: right  Exercise history: pool / swim      Diagnostic Tests  X-ray: abnormal  Patient Goals  Patient goals for therapy: decreased pain and return to work (achieved )  Patient goal: get comfortable         Objective     Concurrent Complaints  Negative for headaches    Postural Observations  Seated posture: fair  Standing posture: fair  Correction of posture: makes symptoms better        Palpation   Left   Hypertonic in the scalenes  Right   Hypertonic in the levator scapulae, suboccipitals and upper trapezius  Tenderness   Cervical Spine   No tenderness in the spinous process  Right Shoulder  No tenderness in the clavicle  Neurological Testing     Sensation   Cervical/Thoracic   Left   Intact: light touch    Right   Intact: light touch    Joint Play     Hypomobile: C3, C4, C5, C6, C7, T1 and 1st rib     Strength/Myotome Testing   Cervical Spine     Left   Normal strength    Right   Normal strength    Left Wrist/Hand      (2nd hand position)     Trial 1: 85    Trial 2: 80    Trial 3: 80    Right Wrist/Hand      (2nd hand position)     Trial 1: 105    Trial 2: 100    Trial 3: 100    Tests   Cervical   Negative alar ligament test, transverse ligament test, VBI and neck flexor muscle endurance test      Right   Negative Spurling's Test A and Spurling's Test B       General Comments:    Upper quarter screen   Shoulder: unremarkable  Elbow: unremarkable  Hand/wrist: unremarkable  Neuro Exam:     Headaches   Patient reports headaches: No        Flowsheet Rows      Most Recent Value   PT/OT G-Codes   Current Score  96   Projected Score  68             Precautions: DM      Manuals 9/17            STM C/s            Traction  c/s            Stretch  scalenes                         Neuro Re-Ed             Posture re-ed             Scap retraction  20x                                                                             Ther Ex             Sh Ext 20x            Rows 20x            No money  20x            Chin tuck head lift 1 min            Corner stretch 1 min            Scalene str 1 min                                      Ther Activity                                       Gait Training                                       Modalities

## 2020-09-21 DIAGNOSIS — D58.2 ELEVATED HEMOGLOBIN (HCC): Primary | ICD-10-CM

## 2020-09-21 DIAGNOSIS — R71.8 ELEVATED HEMATOCRIT: ICD-10-CM

## 2020-10-03 LAB
ASSAY DETAILS: NORMAL
CLINICAL INFO: NORMAL
JAK2 V617F MUTATION: NOT DETECTED
MICRODELETION SYND BLD/T FISH: NORMAL
SL AMB BLOCK/SAMPLE NUMBER: NORMAL
SPECIMEN SOURCE: NORMAL

## 2020-11-27 ENCOUNTER — PATIENT OUTREACH (OUTPATIENT)
Dept: CASE MANAGEMENT | Facility: OTHER | Age: 55
End: 2020-11-27

## 2020-12-01 ENCOUNTER — PATIENT OUTREACH (OUTPATIENT)
Dept: CASE MANAGEMENT | Facility: OTHER | Age: 55
End: 2020-12-01

## 2020-12-13 DIAGNOSIS — I10 ESSENTIAL HYPERTENSION: ICD-10-CM

## 2020-12-13 DIAGNOSIS — E11.9 TYPE 2 DIABETES MELLITUS WITHOUT COMPLICATION, WITHOUT LONG-TERM CURRENT USE OF INSULIN (HCC): ICD-10-CM

## 2020-12-13 RX ORDER — GLIPIZIDE 10 MG/1
TABLET, FILM COATED, EXTENDED RELEASE ORAL
Qty: 90 TABLET | Refills: 0 | Status: SHIPPED | OUTPATIENT
Start: 2020-12-13 | End: 2021-03-09

## 2020-12-13 RX ORDER — AMLODIPINE BESYLATE 10 MG/1
TABLET ORAL
Qty: 90 TABLET | Refills: 3 | Status: SHIPPED | OUTPATIENT
Start: 2020-12-13 | End: 2021-12-06

## 2020-12-29 ENCOUNTER — PATIENT OUTREACH (OUTPATIENT)
Dept: CASE MANAGEMENT | Facility: OTHER | Age: 55
End: 2020-12-29

## 2020-12-30 ENCOUNTER — PATIENT OUTREACH (OUTPATIENT)
Dept: CASE MANAGEMENT | Facility: OTHER | Age: 55
End: 2020-12-30

## 2021-03-09 DIAGNOSIS — E11.9 TYPE 2 DIABETES MELLITUS WITHOUT COMPLICATION, WITHOUT LONG-TERM CURRENT USE OF INSULIN (HCC): ICD-10-CM

## 2021-03-09 LAB — HBA1C MFR BLD HPLC: 13.1 %

## 2021-03-09 RX ORDER — GLIPIZIDE 10 MG/1
TABLET, FILM COATED, EXTENDED RELEASE ORAL
Qty: 90 TABLET | Refills: 0 | Status: SHIPPED | OUTPATIENT
Start: 2021-03-09 | End: 2021-05-03 | Stop reason: SDUPTHER

## 2021-04-13 DIAGNOSIS — E78.2 MIXED HYPERLIPIDEMIA: ICD-10-CM

## 2021-04-13 DIAGNOSIS — E11.9 TYPE 2 DIABETES MELLITUS WITHOUT COMPLICATION, WITHOUT LONG-TERM CURRENT USE OF INSULIN (HCC): ICD-10-CM

## 2021-04-13 RX ORDER — ATORVASTATIN CALCIUM 40 MG/1
40 TABLET, FILM COATED ORAL DAILY
Qty: 90 TABLET | Refills: 1 | Status: SHIPPED | OUTPATIENT
Start: 2021-04-13 | End: 2021-09-30

## 2021-05-03 DIAGNOSIS — E11.9 TYPE 2 DIABETES MELLITUS WITHOUT COMPLICATION, WITHOUT LONG-TERM CURRENT USE OF INSULIN (HCC): ICD-10-CM

## 2021-05-03 RX ORDER — GLIPIZIDE 10 MG/1
10 TABLET, FILM COATED, EXTENDED RELEASE ORAL DAILY
Qty: 90 TABLET | Refills: 3 | Status: SHIPPED | OUTPATIENT
Start: 2021-05-03 | End: 2022-01-24 | Stop reason: SDUPTHER

## 2021-05-20 ENCOUNTER — PATIENT OUTREACH (OUTPATIENT)
Dept: CASE MANAGEMENT | Facility: OTHER | Age: 56
End: 2021-05-20

## 2021-05-20 NOTE — PROGRESS NOTES
1st Attempt     This CHW called patient this day in regards to Longitudinal Care Management call  No answer, Left voicemail with my name and phone number so patient can contact me

## 2021-06-03 DIAGNOSIS — E11.9 TYPE 2 DIABETES MELLITUS WITHOUT COMPLICATION, WITHOUT LONG-TERM CURRENT USE OF INSULIN (HCC): ICD-10-CM

## 2021-06-03 RX ORDER — DAPAGLIFLOZIN 10 MG/1
TABLET, FILM COATED ORAL
Qty: 90 TABLET | Refills: 3 | Status: SHIPPED | OUTPATIENT
Start: 2021-06-03 | End: 2022-04-15

## 2021-06-09 LAB
CREAT ?TM UR-SCNC: 59 UMOL/L
EXT MICROALBUMIN URINE RANDOM: 1.4
HBA1C MFR BLD HPLC: 10.1 %
MICROALBUMIN/CREAT UR: 24 MG/G{CREAT}

## 2021-06-18 ENCOUNTER — PATIENT OUTREACH (OUTPATIENT)
Dept: CASE MANAGEMENT | Facility: OTHER | Age: 56
End: 2021-06-18

## 2021-06-18 NOTE — PROGRESS NOTES
2nd Attempt    This CHW called patient this day in regards to Longitudinal Care Management call  No answer, Left voicemail with my name and phone number so patient can contact me   This CHW will reach out in three months

## 2021-06-22 ENCOUNTER — OFFICE VISIT (OUTPATIENT)
Dept: FAMILY MEDICINE CLINIC | Facility: CLINIC | Age: 56
End: 2021-06-22
Payer: COMMERCIAL

## 2021-06-22 VITALS
OXYGEN SATURATION: 97 % | HEART RATE: 87 BPM | BODY MASS INDEX: 34.37 KG/M2 | WEIGHT: 219 LBS | SYSTOLIC BLOOD PRESSURE: 118 MMHG | HEIGHT: 67 IN | TEMPERATURE: 98.3 F | DIASTOLIC BLOOD PRESSURE: 80 MMHG | RESPIRATION RATE: 16 BRPM

## 2021-06-22 DIAGNOSIS — E11.9 TYPE 2 DIABETES MELLITUS WITHOUT COMPLICATION, WITHOUT LONG-TERM CURRENT USE OF INSULIN (HCC): Primary | ICD-10-CM

## 2021-06-22 DIAGNOSIS — J45.20 MILD INTERMITTENT ASTHMA WITHOUT COMPLICATION: ICD-10-CM

## 2021-06-22 DIAGNOSIS — I10 ESSENTIAL HYPERTENSION: ICD-10-CM

## 2021-06-22 DIAGNOSIS — R06.02 SHORTNESS OF BREATH: ICD-10-CM

## 2021-06-22 DIAGNOSIS — E78.01 FAMILIAL HYPERCHOLESTEROLEMIA: ICD-10-CM

## 2021-06-22 PROCEDURE — 99214 OFFICE O/P EST MOD 30 MIN: CPT | Performed by: NURSE PRACTITIONER

## 2021-06-22 RX ORDER — ALBUTEROL SULFATE 90 UG/1
2 AEROSOL, METERED RESPIRATORY (INHALATION) EVERY 4 HOURS PRN
Qty: 6.7 G | Refills: 3 | Status: SHIPPED | OUTPATIENT
Start: 2021-06-22

## 2021-06-22 RX ORDER — SEMAGLUTIDE 1.34 MG/ML
1 INJECTION, SOLUTION SUBCUTANEOUS WEEKLY
COMMUNITY
Start: 2021-06-15

## 2021-06-22 RX ORDER — ALBUTEROL SULFATE 2.5 MG/3ML
2.5 SOLUTION RESPIRATORY (INHALATION) EVERY 6 HOURS PRN
Qty: 60 ML | Refills: 0 | Status: SHIPPED | OUTPATIENT
Start: 2021-06-22 | End: 2021-08-10

## 2021-06-23 ENCOUNTER — TELEPHONE (OUTPATIENT)
Dept: ADMINISTRATIVE | Facility: OTHER | Age: 56
End: 2021-06-23

## 2021-06-23 PROBLEM — E78.01 FAMILIAL HYPERCHOLESTEROLEMIA: Status: ACTIVE | Noted: 2021-06-23

## 2021-06-23 NOTE — PROGRESS NOTES
Assessment/Plan:    1  Type 2 diabetes mellitus without complication, without long-term current use of insulin ([facility] [address] )  Assessment & Plan:  Pt reports recent weight loss, nutrition and exercise  Recommend he continue carb and sugar reduction  Reviewed cardiac risks  Following up with endocrinology  Stable  Lab Results   Component Value Date    HGBA1C 13 1 03/09/2021       Orders:  -     Lipid panel; Future; Expected date: 09/22/2021  -     Lipid panel  -     Microalbumin / creatinine urine ratio    2  Mild intermittent asthma without complication  Assessment & Plan:  Symptoms stable  No recent use of rescue inhaler  No changes today  Orders:  -     albuterol (PROVENTIL HFA,VENTOLIN HFA) 90 mcg/act inhaler; Inhale 2 puffs every 4 (four) hours as needed for wheezing    3  Essential hypertension  Assessment & Plan:  BP stable in office  Compliant with medications  Stable, no changes today  4  Familial hypercholesterolemia  Assessment & Plan:  Reviewed lipid panel - wnl  Advise ongoing weight loss and exercise  Pt would like DC atorvastatin  Will recheck lipids in 3 months  Review cardiac risks associated with HTN, DM and lipids  Orders:  -     Lipid panel; Future; Expected date: 09/22/2021  -     Lipid panel    5  Shortness of breath  Comments:  RESOLVED  STABLE  Orders:  -     albuterol (2 5 mg/3 mL) 0 083 % nebulizer solution; Take 3 mL (2 5 mg total) by nebulization every 6 (six) hours as needed for wheezing or shortness of breath    BMI Counseling: Body mass index is 34 3 kg/m²  The BMI is above normal  Nutrition recommendations include decreasing portion sizes, encouraging healthy choices of fruits and vegetables, decreasing fast food intake, consuming healthier snacks, limiting drinks that contain sugar, moderation in carbohydrate intake, increasing intake of lean protein, reducing intake of saturated and trans fat and reducing intake of cholesterol   Exercise recommendations include exercising 3-5 times per week  Return in about 3 months (around 9/22/2021) for Diabetic Follow Up  Subjective:      Patient ID: Arsenio Marx is a 61 y o  male  Chief Complaint   Patient presents with    6 month fu    Medication Management     lipitor    Medication Refill     nebulizer & inhaler    DM Foot exam     shoes & socks off       Bharath Galan is a 61year old diabetic male with hyperlipidemia who presents to the office for discussion of recent lab work  Pt reports significant weight loss, has been exercising regularly and watching his nutrition  Denies chest pain, shortness of breath, n/v/d  Medication Refill  Pertinent negatives include no chest pain, chills, coughing, fatigue or fever  The following portions of the patient's history were reviewed and updated as appropriate: allergies, current medications, past family history, past medical history, past social history, past surgical history and problem list     Review of Systems   Constitutional: Negative for chills, fatigue and fever  Respiratory: Negative for cough, chest tightness and shortness of breath  Cardiovascular: Negative for chest pain, palpitations and leg swelling           Current Outpatient Medications   Medication Sig Dispense Refill    albuterol (2 5 mg/3 mL) 0 083 % nebulizer solution Take 3 mL (2 5 mg total) by nebulization every 6 (six) hours as needed for wheezing or shortness of breath 60 mL 0    albuterol (PROVENTIL HFA,VENTOLIN HFA) 90 mcg/act inhaler Inhale 2 puffs every 4 (four) hours as needed for wheezing 6 7 g 3    amLODIPine (NORVASC) 10 mg tablet TAKE 1 TABLET BY MOUTH EVERY DAY 90 tablet 3    atorvastatin (LIPITOR) 40 mg tablet Take 1 tablet (40 mg total) by mouth daily 90 tablet 1    Cholecalciferol (VITAMIN D-3) 5000 units TABS Take 1 tablet by mouth daily      cyclobenzaprine (FLEXERIL) 10 mg tablet Take 1 tablet (10 mg total) by mouth 3 (three) times a day as needed for muscle spasms BRAND ONLY PLEASE 45 tablet 1    Farxiga 10 MG TABS TAKE 1 TABLET BY MOUTH EVERY DAY 90 tablet 3    glipiZIDE (GLUCOTROL XL) 10 mg 24 hr tablet Take 1 tablet (10 mg total) by mouth daily 90 tablet 3    glucose blood test strip Contour Meter      MICROLET LANCETS MISC Microlet Lancet      Mometasone Furoate (ASMANEX HFA) 100 MCG/ACT AERO Inhale 1 puff 2 (two) times a day      Ozempic, 1 MG/DOSE, 4 MG/3ML SOPN injection pen       pantoprazole (PROTONIX) 40 mg tablet Take 1 tablet by mouth daily      TRULICITY 1 5 EY/4 9QL SOPN  (Patient not taking: Reported on 6/22/2021)       No current facility-administered medications for this visit  Objective:    /80   Pulse 87   Temp 98 3 °F (36 8 °C) (Temporal)   Resp 16   Ht 5' 7" (1 702 m)   Wt 99 3 kg (219 lb)   SpO2 97%   BMI 34 30 kg/m²        Physical Exam  Vitals reviewed  Constitutional:       General: He is not in acute distress  Appearance: He is well-developed  He is not diaphoretic  HENT:      Head: Normocephalic and atraumatic  Eyes:      General:         Right eye: No discharge  Left eye: No discharge  Conjunctiva/sclera: Conjunctivae normal    Neck:      Thyroid: No thyromegaly  Cardiovascular:      Rate and Rhythm: Normal rate and regular rhythm  Pulses: no weak pulses          Dorsalis pedis pulses are 2+ on the right side and 2+ on the left side  Posterior tibial pulses are 2+ on the right side and 2+ on the left side  Heart sounds: Normal heart sounds  Pulmonary:      Effort: Pulmonary effort is normal  No respiratory distress  Breath sounds: Normal breath sounds  No decreased breath sounds, wheezing, rhonchi or rales  Musculoskeletal:      Cervical back: Normal range of motion and neck supple  Feet:      Right foot:      Skin integrity: No ulcer, skin breakdown, erythema, warmth, callus or dry skin        Left foot:      Skin integrity: No ulcer, skin breakdown, erythema, warmth, callus or dry skin  Lymphadenopathy:      Cervical: No cervical adenopathy  Skin:     General: Skin is warm and dry  Findings: No rash  Neurological:      Mental Status: He is alert and oriented to person, place, and time  Psychiatric:         Behavior: Behavior normal          Thought Content: Thought content normal          Judgment: Judgment normal            Patient's shoes and socks removed  Right Foot/Ankle   Right Foot Inspection  Skin Exam: skin normal and skin intact no dry skin, no warmth, no callus, no erythema, no maceration, no abnormal color, no pre-ulcer, no ulcer and no callus                          Toe Exam: ROM and strength within normal limits  Sensory   Vibration: intact  Proprioception: intact   Monofilament testing: intact  Vascular  Capillary refills: < 3 seconds  The right DP pulse is 2+  The right PT pulse is 2+  Left Foot/Ankle  Left Foot Inspection  Skin Exam: skin normal and skin intactno dry skin, no warmth, no erythema, no maceration, normal color, no pre-ulcer, no ulcer and no callus                         Toe Exam: ROM and strength within normal limits                   Sensory   Vibration: intact  Proprioception: intact  Monofilament: intact  Vascular  Capillary refills: < 3 seconds  The left DP pulse is 2+  The left PT pulse is 2+  Assign Risk Category:  No deformity present; No loss of protective sensation;  No weak pulses       Risk: [address] [facility] [address] Dollar

## 2021-06-23 NOTE — ASSESSMENT & PLAN NOTE
Pt reports recent weight loss, nutrition and exercise  Recommend he continue carb and sugar reduction  Reviewed cardiac risks  Following up with endocrinology  Stable       Lab Results   Component Value Date    HGBA1C 13 1 03/09/2021

## 2021-06-23 NOTE — TELEPHONE ENCOUNTER
----- Message from Isabella Cowden, [place] sent at 6/22/2021  2:33 PM EDT -----  Regarding: DM a1c & uRINE mICRO  06/22/21 2:33 PM    Hello, our patient attached above has had Diabetic Foot Exam, Hemoglobin A1c, and Urine Microalbumin completed/performed  Please assist in updating the patient chart by making an External outreach to dr James Mason facility located in [facility]  The date of service is 2021      Thank you,  Isabella Cowden, MA  [address]

## 2021-06-23 NOTE — TELEPHONE ENCOUNTER
Upon review of the In Basket request and the patient's chart, initial outreach has been made via fax, please see Contacts section for details       Thank you  Amairani Cox MA

## 2021-06-23 NOTE — LETTER
Diabetic Foot Exam Form    Date Requested: 21  Patient: Dayana Epperson  Patient : 1965   Referring Provider: Lisa Batista MD    Diabetic Foot Exam Performed with shoes and socks removed        Yes         No     Date of Diabetic Foot Exam ______________________________  Risk Score ____________________________________________    Left Foot       Visual Inspection         Monofilament Testing Sensory Exam        Pedal Pulses         Additional Comments         Right Foot      Visual Inspection         Monofilament Testing Sensory Exam       Pedal Pulses         Additional Comments         Comments __________________________________________________________    Practice Providing Exam ______________________________________________    Exam Performed By (print name) _______________________________________      Provider Signature ___________________________________________________      These reports are needed for  compliance    Please fax this completed form and a copy of the Diabetic Foot Exam report to our office located at [place] [address] as soon as possible to [phone] attention [place]: Phone [phone]    We thank you for your assistance in treating our mutual patient     ([facility]  [place] [facility]

## 2021-06-23 NOTE — LETTER
Lab Result(s) Request Form: Hemoglobin A1c and Urine Microalbumin or Protein Creatinine Ratio      Date Requested: 21  Patient: Heidi Hill  Patient : 1965   Referring Provider: Rakel Simon MD        Date of Lab Collection ______________________________       The above patient has informed us that they have completed their   most recent Hemoglobin A1c and Urine Microalbumin or Protein Creatinine Ratio at your facility  Please complete   this form and attach all corresponding procedure reports/results  Comments __________________________________________________________  ____________________________________________________________________  ____________________________________________________________________  ____________________________________________________________________    Collecting/Resulting Facility  ___________________________________________  Form Completed By (print name) ________________________________________    Signature ___________________________________________________________      These reports are needed for  compliance    Please fax this completed form and a copy of the lab results/report to our office located at [facility] as soon as possible to [phone] attention [place]iana: Phone [phone]    We thank you for your assistance in treating our mutual patient     ([facility] [place] [facility])

## 2021-06-23 NOTE — ASSESSMENT & PLAN NOTE
Reviewed lipid panel - wnl  Advise ongoing weight loss and exercise  Pt would like DC atorvastatin  Will recheck lipids in 3 months  Review cardiac risks associated with HTN, DM and lipids

## 2021-06-28 NOTE — TELEPHONE ENCOUNTER
Upon review of the In Basket request we were able to locate, review, and update the patient chart as requested for Hemoglobin A1c and Urine Microalbumin  Any additional questions or concerns should be emailed to the Practice Liaisons via [email or web address] email, please do not reply via In Basket      Thank you  Gaudencio Coronado MA

## 2021-06-28 NOTE — TELEPHONE ENCOUNTER
Upon review of the In Basket request we were able to locate, review, and update the patient chart as requested for Diabetic Foot Exam     Any additional questions or concerns should be emailed to the Practice Liaisons via [email or web address] email, please do not reply via In Basket      Thank you  Jessica Werner MA

## 2021-06-28 NOTE — TELEPHONE ENCOUNTER
As a follow-up, a second attempt has been made for outreach via telephone call, please see Contacts section for details      Thank you  Dwain Smith MA

## 2021-08-07 DIAGNOSIS — R06.02 SHORTNESS OF BREATH: ICD-10-CM

## 2021-08-10 RX ORDER — ALBUTEROL SULFATE 2.5 MG/3ML
SOLUTION RESPIRATORY (INHALATION)
Qty: 180 ML | Refills: 0 | Status: SHIPPED | OUTPATIENT
Start: 2021-08-10

## 2021-09-27 LAB
CHOLEST SERPL-MCNC: 215 MG/DL
CHOLEST/HDLC SERPL: 4.7 (CALC)
HDLC SERPL-MCNC: 46 MG/DL
LDLC SERPL CALC-MCNC: 124 MG/DL (CALC)
NONHDLC SERPL-MCNC: 169 MG/DL (CALC)
TRIGL SERPL-MCNC: 317 MG/DL

## 2021-09-30 ENCOUNTER — OFFICE VISIT (OUTPATIENT)
Dept: FAMILY MEDICINE CLINIC | Facility: CLINIC | Age: 56
End: 2021-09-30
Payer: COMMERCIAL

## 2021-09-30 VITALS
SYSTOLIC BLOOD PRESSURE: 122 MMHG | BODY MASS INDEX: 34.69 KG/M2 | DIASTOLIC BLOOD PRESSURE: 90 MMHG | RESPIRATION RATE: 20 BRPM | TEMPERATURE: 98.8 F | HEIGHT: 67 IN | HEART RATE: 76 BPM | WEIGHT: 221 LBS | OXYGEN SATURATION: 97 %

## 2021-09-30 DIAGNOSIS — K21.00 GASTROESOPHAGEAL REFLUX DISEASE WITH ESOPHAGITIS, UNSPECIFIED WHETHER HEMORRHAGE: ICD-10-CM

## 2021-09-30 DIAGNOSIS — J45.20 MILD INTERMITTENT ASTHMA WITHOUT COMPLICATION: ICD-10-CM

## 2021-09-30 DIAGNOSIS — E11.9 TYPE 2 DIABETES MELLITUS WITHOUT COMPLICATION, WITHOUT LONG-TERM CURRENT USE OF INSULIN (HCC): Primary | ICD-10-CM

## 2021-09-30 DIAGNOSIS — E78.2 MIXED HYPERLIPIDEMIA: ICD-10-CM

## 2021-09-30 DIAGNOSIS — Z23 NEED FOR INFLUENZA VACCINATION: ICD-10-CM

## 2021-09-30 PROBLEM — E78.01 FAMILIAL HYPERCHOLESTEROLEMIA: Status: RESOLVED | Noted: 2021-06-23 | Resolved: 2021-09-30

## 2021-09-30 LAB — SL AMB POCT HEMOGLOBIN AIC: 9.5 (ref ?–6.5)

## 2021-09-30 PROCEDURE — 83036 HEMOGLOBIN GLYCOSYLATED A1C: CPT | Performed by: FAMILY MEDICINE

## 2021-09-30 PROCEDURE — 99214 OFFICE O/P EST MOD 30 MIN: CPT | Performed by: FAMILY MEDICINE

## 2021-09-30 PROCEDURE — 90471 IMMUNIZATION ADMIN: CPT

## 2021-09-30 PROCEDURE — 90682 RIV4 VACC RECOMBINANT DNA IM: CPT

## 2021-09-30 RX ORDER — ALBUTEROL SULFATE 90 UG/1
AEROSOL, METERED RESPIRATORY (INHALATION)
COMMUNITY
End: 2021-09-30 | Stop reason: SDUPTHER

## 2021-09-30 RX ORDER — ROSUVASTATIN CALCIUM 20 MG/1
20 TABLET, COATED ORAL DAILY
Qty: 30 TABLET | Refills: 5 | Status: SHIPPED | OUTPATIENT
Start: 2021-09-30 | End: 2022-02-08 | Stop reason: HOSPADM

## 2021-09-30 RX ORDER — ALPRAZOLAM 0.5 MG/1
TABLET ORAL
COMMUNITY
End: 2021-09-30 | Stop reason: HOSPADM

## 2021-09-30 RX ORDER — MOMETASONE FUROATE 100 UG/1
1 AEROSOL RESPIRATORY (INHALATION) 2 TIMES DAILY
Qty: 13 G | Refills: 3 | Status: SHIPPED | OUTPATIENT
Start: 2021-09-30

## 2021-09-30 RX ORDER — SEMAGLUTIDE 1.34 MG/ML
INJECTION, SOLUTION SUBCUTANEOUS
COMMUNITY
End: 2021-09-30 | Stop reason: SDUPTHER

## 2021-09-30 NOTE — PROGRESS NOTES
Assessment/Plan:    Type 2 diabetes mellitus without complication, without long-term current use of insulin (Formerly [facility])    Lab Results   Component Value Date    HGBA1C 10 1 06/09/2021       COMPLIANT WITH MEDICATION  DENIES ANY NUMBNESS, TINGLING IN FEET    - DISCUSSED DIETARY MODIFICATION OF CARBOHYDRATES  - HGB A1C AND URINE STUDIES DUE TODAY  - FOOT CARE  - RV 3 MONTHS        Mixed hyperlipidemia  WATCHING CHOLESTEROL INTAKE  COMPLIANT WITH MEDICATION  NO CONCERNS    - CONTINUE TO MONITOR DIETARY CHOL INTAKE  - CONTINUE CURRENT MEDICATION  - ENCOURAGED PHYSICAL ACTIVITY        Gastro-esophageal reflux disease with esophagitis  STABLE  DENIES ANY CP, INDIGESTION, OR COUGH  NOTES NO MELENA OR HEMATOCHEZIA  NO HEMATEMESIS  COMPLIANT WITH MEDICATION  NO CONCERNS    - CONTINUE CURRENT TREATMENT PLAN  - MEDICATION AS PRESCRIBED  - AVOID CAFFEINE, ALCOHOL OR SMOKING         Diagnoses and all orders for this visit:    Type 2 diabetes mellitus without complication, without long-term current use of insulin (Formerly [facility])  -     POCT hemoglobin A1c    Mixed hyperlipidemia  -     rosuvastatin (CRESTOR) 20 MG tablet; Take 1 tablet (20 mg total) by mouth daily    Gastroesophageal reflux disease with esophagitis, unspecified whether hemorrhage    Need for influenza vaccination  -     influenza vaccine, quadrivalent, recombinant, PF, 0 5 mL, for patients 18 yr+ (FLUBLOK)    Mild intermittent asthma without complication  -     Mometasone Furoate (Asmanex HFA) 100 MCG/ACT AERO;  Inhale 1 puff (100 mcg total) 2 (two) times a day    Other orders  -     Discontinue: ALPRAZolam (XANAX) 0 5 mg tablet; alprazolam 0 5 mg tablet (Patient not taking: Reported on 9/30/2021)  -     Discontinue: albuterol (PROVENTIL HFA,VENTOLIN HFA) 90 mcg/act inhaler; albuterol sulfate HFA 90 mcg/actuation aerosol inhaler (Patient not taking: Reported on 9/30/2021)  -     Discontinue: Semaglutide,0 25 or 0 5MG/DOS, (Ozempic, 0 25 or 0 5 MG/DOSE,) 2 MG/1 5ML SOPN; Ozempic 0 25 mg or 0 5 mg (2 mg/1 5 mL) subcutaneous pen injector (Patient not taking: Reported on 9/30/2021)          Patient Instructions   CONTINUE CURRENT TREATMENT PLAN  MONITOR DIETARY SODIUM, CHOL, AND CARBOHYDRATE INTAKE  ENCOURAGE PHYSICAL ACTIVITY  FOOT CARE    RV 3 M, SOONER PRN        Return in about 3 months (around 12/30/2021) for Recheck  Subjective:      Patient ID: Sunday Batch is a 61 y o  male  Chief Complaint   Patient presents with    Follow-up     Pt here for a b/w rev  PATIENT RETURNS FOR ROUTINE EVALUATION OF PATIENT'S MEDICAL ISSUES    INDIVIDUAL MEDICAL ISSUES WITH THEIR CURRENT STATUS, ASSESSMENT AND PLANS ARE LISTED ABOVE          The following portions of the patient's history were reviewed and updated as appropriate: allergies, current medications, past family history, past medical history, past social history, past surgical history and problem list     Review of Systems   Constitutional: Negative for chills, fatigue and fever  HENT: Negative for congestion, ear discharge, ear pain, mouth sores, postnasal drip, sore throat and trouble swallowing  Eyes: Negative for pain, discharge and visual disturbance  Respiratory: Negative for cough, shortness of breath and wheezing  Cardiovascular: Negative for chest pain, palpitations and leg swelling  Gastrointestinal: Negative for abdominal distention, abdominal pain, blood in stool, diarrhea and nausea  Endocrine: Negative for polydipsia, polyphagia and polyuria  Genitourinary: Negative for dysuria, frequency, hematuria and urgency  Musculoskeletal: Negative for arthralgias, gait problem and joint swelling  Skin: Negative for pallor and rash  Neurological: Negative for dizziness, syncope, speech difficulty, weakness, light-headedness, numbness and headaches  Hematological: Negative for adenopathy  Psychiatric/Behavioral: Negative for behavioral problems, confusion and sleep disturbance   The patient is not nervous/anxious  Current Outpatient Medications   Medication Sig Dispense Refill    albuterol (2 5 mg/3 mL) 0 083 % nebulizer solution INHALE THE CONTENTS OF 1 VIAL VIA NEBULIZER EVERY 6 HOURS AS NEEDED FOR WHEEZING OR SHORTNESS OF BREATH 180 mL 0    albuterol (PROVENTIL HFA,VENTOLIN HFA) 90 mcg/act inhaler Inhale 2 puffs every 4 (four) hours as needed for wheezing 6 7 g 3    amLODIPine (NORVASC) 10 mg tablet TAKE 1 TABLET BY MOUTH EVERY DAY 90 tablet 3    Cholecalciferol (VITAMIN D-3) 5000 units TABS Take 1 tablet by mouth daily      cyclobenzaprine (FLEXERIL) 10 mg tablet Take 1 tablet (10 mg total) by mouth 3 (three) times a day as needed for muscle spasms BRAND ONLY PLEASE 45 tablet 1    Farxiga 10 MG TABS TAKE 1 TABLET BY MOUTH EVERY DAY 90 tablet 3    glipiZIDE (GLUCOTROL XL) 10 mg 24 hr tablet Take 1 tablet (10 mg total) by mouth daily 90 tablet 3    glucose blood test strip Contour Meter      MICROLET LANCETS MISC Microlet Lancet      Mometasone Furoate (Asmanex HFA) 100 MCG/ACT AERO Inhale 1 puff (100 mcg total) 2 (two) times a day 13 g 3    Ozempic, 1 MG/DOSE, 4 MG/3ML SOPN injection pen       pantoprazole (PROTONIX) 40 mg tablet Take 1 tablet by mouth daily      rosuvastatin (CRESTOR) 20 MG tablet Take 1 tablet (20 mg total) by mouth daily 30 tablet 5     No current facility-administered medications for this visit  Objective:    /90 (BP Location: Right arm, Patient Position: Sitting, Cuff Size: Adult)   Pulse 76   Temp 98 8 °F (37 1 °C) (Temporal)   Resp 20   Ht 5' 7" (1 702 m)   Wt 100 kg (221 lb)   SpO2 97%   BMI 34 61 kg/m²        Physical Exam  Constitutional:       Appearance: He is well-developed  HENT:      Head: Normocephalic and atraumatic  Eyes:      General:         Right eye: No discharge  Left eye: No discharge  Conjunctiva/sclera: Conjunctivae normal       Pupils: Pupils are equal, round, and reactive to light     Neck:      Thyroid: No thyromegaly  Vascular: No JVD  Cardiovascular:      Rate and Rhythm: Normal rate and regular rhythm  Heart sounds: Normal heart sounds  No murmur heard  Pulmonary:      Effort: Pulmonary effort is normal       Breath sounds: Normal breath sounds  No wheezing or rales  Abdominal:      General: Bowel sounds are normal       Palpations: Abdomen is soft  There is no mass  Tenderness: There is no abdominal tenderness  There is no guarding or rebound  Comments: OBESE     Musculoskeletal:         General: No tenderness or deformity  Normal range of motion  Cervical back: Neck supple  Lymphadenopathy:      Cervical: No cervical adenopathy  Skin:     General: Skin is warm and dry  Findings: No erythema or rash  Neurological:      Mental Status: He is alert and oriented to person, place, and time  Psychiatric:         Behavior: Behavior normal          Thought Content:  Thought content normal          Judgment: Judgment normal                 Esvin Keene MD

## 2021-09-30 NOTE — ASSESSMENT & PLAN NOTE
Lab Results   Component Value Date    HGBA1C 10 1 06/09/2021       COMPLIANT WITH MEDICATION  DENIES ANY NUMBNESS, TINGLING IN FEET    - DISCUSSED DIETARY MODIFICATION OF CARBOHYDRATES  - HGB A1C AND URINE STUDIES DUE TODAY  - FOOT CARE  - RV 3 MONTHS

## 2021-10-14 ENCOUNTER — PATIENT OUTREACH (OUTPATIENT)
Dept: CASE MANAGEMENT | Facility: OTHER | Age: 56
End: 2021-10-14

## 2021-12-05 DIAGNOSIS — I10 ESSENTIAL HYPERTENSION: ICD-10-CM

## 2021-12-06 RX ORDER — AMLODIPINE BESYLATE 10 MG/1
TABLET ORAL
Qty: 90 TABLET | Refills: 3 | Status: SHIPPED | OUTPATIENT
Start: 2021-12-06

## 2022-01-12 ENCOUNTER — TELEPHONE (OUTPATIENT)
Dept: FAMILY MEDICINE CLINIC | Facility: CLINIC | Age: 57
End: 2022-01-12

## 2022-01-12 NOTE — TELEPHONE ENCOUNTER
LM letting Bozena Dumont know that the orders are ready to be picked up   No further action required

## 2022-01-12 NOTE — TELEPHONE ENCOUNTER
[facility] is going [facility] to have bw drawn for another dr   He wants to have all his bw done at the same time    Please place bw orders    Will

## 2022-01-24 ENCOUNTER — TELEPHONE (OUTPATIENT)
Dept: FAMILY MEDICINE CLINIC | Facility: CLINIC | Age: 57
End: 2022-01-24

## 2022-01-24 NOTE — TELEPHONE ENCOUNTER
Informed Steph Tony that Dr Georgie Cote sent in the rx that he requested   No further action required

## 2022-01-24 NOTE — TELEPHONE ENCOUNTER
For some reason Tete Rios has run out of his glipizide  His next supply from mail order is not coming for a week  Can he have 1 weeks worth  Can he have a rx sent to [facility] on [address] in [place]

## 2022-01-25 DIAGNOSIS — E11.9 TYPE 2 DIABETES MELLITUS WITHOUT COMPLICATION, WITHOUT LONG-TERM CURRENT USE OF INSULIN (HCC): ICD-10-CM

## 2022-01-25 RX ORDER — GLIPIZIDE 10 MG/1
10 TABLET, FILM COATED, EXTENDED RELEASE ORAL DAILY
Qty: 30 TABLET | Refills: 3 | Status: SHIPPED | OUTPATIENT
Start: 2022-01-25 | End: 2022-03-11 | Stop reason: SDUPTHER

## 2022-02-03 RX ORDER — FLASH GLUCOSE SENSOR
KIT MISCELLANEOUS
COMMUNITY
Start: 2022-01-13

## 2022-02-08 ENCOUNTER — OFFICE VISIT (OUTPATIENT)
Dept: FAMILY MEDICINE CLINIC | Facility: CLINIC | Age: 57
End: 2022-02-08
Payer: COMMERCIAL

## 2022-02-08 VITALS
SYSTOLIC BLOOD PRESSURE: 122 MMHG | HEART RATE: 80 BPM | OXYGEN SATURATION: 98 % | RESPIRATION RATE: 16 BRPM | TEMPERATURE: 97.7 F | BODY MASS INDEX: 33.9 KG/M2 | WEIGHT: 216 LBS | DIASTOLIC BLOOD PRESSURE: 94 MMHG | HEIGHT: 67 IN

## 2022-02-08 DIAGNOSIS — J45.20 MILD INTERMITTENT ASTHMA WITHOUT COMPLICATION: ICD-10-CM

## 2022-02-08 DIAGNOSIS — I10 ESSENTIAL HYPERTENSION: ICD-10-CM

## 2022-02-08 DIAGNOSIS — Z12.11 COLON CANCER SCREENING: ICD-10-CM

## 2022-02-08 DIAGNOSIS — E11.9 TYPE 2 DIABETES MELLITUS WITHOUT COMPLICATION, WITHOUT LONG-TERM CURRENT USE OF INSULIN (HCC): ICD-10-CM

## 2022-02-08 DIAGNOSIS — E78.2 MIXED HYPERLIPIDEMIA: ICD-10-CM

## 2022-02-08 DIAGNOSIS — K21.00 GASTROESOPHAGEAL REFLUX DISEASE WITH ESOPHAGITIS, UNSPECIFIED WHETHER HEMORRHAGE: ICD-10-CM

## 2022-02-08 DIAGNOSIS — Z00.00 ROUTINE GENERAL MEDICAL EXAMINATION AT A HEALTH CARE FACILITY: Primary | ICD-10-CM

## 2022-02-08 DIAGNOSIS — Z12.5 ENCOUNTER FOR PROSTATE CANCER SCREENING: ICD-10-CM

## 2022-02-08 DIAGNOSIS — Z13.29 SCREENING FOR HYPOTHYROIDISM: ICD-10-CM

## 2022-02-08 LAB — SL AMB POCT FECES OCC BLD: NORMAL

## 2022-02-08 PROCEDURE — 3074F SYST BP LT 130 MM HG: CPT | Performed by: FAMILY MEDICINE

## 2022-02-08 PROCEDURE — 99396 PREV VISIT EST AGE 40-64: CPT | Performed by: FAMILY MEDICINE

## 2022-02-08 PROCEDURE — 3080F DIAST BP >= 90 MM HG: CPT | Performed by: FAMILY MEDICINE

## 2022-02-08 PROCEDURE — 82270 OCCULT BLOOD FECES: CPT | Performed by: FAMILY MEDICINE

## 2022-02-08 PROCEDURE — 3725F SCREEN DEPRESSION PERFORMED: CPT | Performed by: FAMILY MEDICINE

## 2022-02-08 PROCEDURE — 93000 ELECTROCARDIOGRAM COMPLETE: CPT | Performed by: FAMILY MEDICINE

## 2022-02-08 PROCEDURE — 3008F BODY MASS INDEX DOCD: CPT | Performed by: FAMILY MEDICINE

## 2022-02-08 RX ORDER — ATORVASTATIN CALCIUM 10 MG/1
TABLET, FILM COATED ORAL
COMMUNITY
Start: 2022-01-20 | End: 2022-02-08

## 2022-02-08 NOTE — LETTER
YENY BLAKELYOCK      Current Outpatient Medications:     albuterol (2 5 mg/3 mL) 0 083 % nebulizer solution, INHALE THE CONTENTS OF 1 VIAL VIA NEBULIZER EVERY 6 HOURS AS NEEDED FOR WHEEZING OR SHORTNESS OF BREATH, Disp: 180 mL, Rfl: 0    albuterol (PROVENTIL HFA,VENTOLIN HFA) 90 mcg/act inhaler, Inhale 2 puffs every 4 (four) hours as needed for wheezing, Disp: 6 7 g, Rfl: 3    amLODIPine (NORVASC) 10 mg tablet, TAKE 1 TABLET BY MOUTH EVERY DAY, Disp: 90 tablet, Rfl: 3    Cholecalciferol (VITAMIN D-3) 5000 units TABS, Take 1 tablet by mouth daily, Disp: , Rfl:     Continuous Blood Gluc Sensor (FreeStyle Calderon 2 Sensor) MISC, , Disp: , Rfl:     cyclobenzaprine (FLEXERIL) 10 mg tablet, Take 1 tablet (10 mg total) by mouth 3 (three) times a day as needed for muscle spasms BRAND ONLY PLEASE, Disp: 45 tablet, Rfl: 1    Farxiga 10 MG TABS, TAKE 1 TABLET BY MOUTH EVERY DAY, Disp: 90 tablet, Rfl: 3    glipiZIDE (GLUCOTROL XL) 10 mg 24 hr tablet, Take 1 tablet (10 mg total) by mouth daily, Disp: 30 tablet, Rfl: 3    glucose blood test strip, Contour Meter, Disp: , Rfl:     MICROLET LANCETS MISC, Microlet Lancet, Disp: , Rfl:     Mometasone Furoate (Asmanex HFA) 100 MCG/ACT AERO, Inhale 1 puff (100 mcg total) 2 (two) times a day, Disp: 13 g, Rfl: 3    Ozempic, 1 MG/DOSE, 4 MG/3ML SOPN injection pen, , Disp: , Rfl:     pantoprazole (PROTONIX) 40 mg tablet, Take 1 tablet by mouth daily, Disp: , Rfl:     rosuvastatin (CRESTOR) 20 MG tablet, Take 1 tablet (20 mg total) by mouth daily, Disp: 30 tablet, Rfl: 5      Recent Results (from the past 672 hour(s))   CBC and differential    Collection Time: 01/18/22  2:16 PM   Result Value Ref Range    White Blood Cell Count 5 1 3 8 - 10 8 Thousand/uL    Red Blood Cell Count 6 63 (H) 4 20 - 5 80 Million/uL    Hemoglobin 17 7 (H) 13 2 - 17 1 g/dL    HCT 53 2 (H) 38 5 - 50 0 %    MCV 80 2 80 0 - 100 0 fL    MCH 26 7 (L) 27 0 - 33 0 pg    MCHC 33 3 32 0 - 36 0 g/dL    RDW 14 2 11 0 - 15 0 %    Platelet Count 404 360 - 400 Thousand/uL    SL AMB MPV 9 2 7 5 - 12 5 fL    Neutrophils (Absolute) 2,458 1,500 - 7,800 cells/uL    Lymphocytes (Absolute) 2,086 850 - 3,900 cells/uL    Monocytes (Absolute) 423 200 - 950 cells/uL    Eosinophils (Absolute) 92 15 - 500 cells/uL    Basophils ABS 41 0 - 200 cells/uL    Neutrophils 48 2 %    Lymphocytes 40 9 %    Monocytes 8 3 %    Eosinophils 1 8 %    Basophils PCT 0 8 %   Comprehensive metabolic panel    Collection Time: 01/18/22  2:16 PM   Result Value Ref Range    Glucose, Random 146 (H) 65 - 139 mg/dL    BUN 12 7 - 25 mg/dL    Creatinine 0 85 0 70 - 1 33 mg/dL    eGFR Non  96 > OR = 60 mL/min/1 73m2    eGFR  111 > OR = 60 mL/min/1 73m2    SL AMB BUN/CREATININE RATIO NOT APPLICABLE 6 - 22 (calc)    Sodium 138 135 - 146 mmol/L    Potassium 4 1 3 5 - 5 3 mmol/L    Chloride 104 98 - 110 mmol/L    CO2 25 20 - 32 mmol/L    Calcium 9 4 8 6 - 10 3 mg/dL    Protein, Total 7 4 6 1 - 8 1 g/dL    Albumin 4 4 3 6 - 5 1 g/dL    Globulin 3 0 1 9 - 3 7 g/dL (calc)    Albumin/Globulin Ratio 1 5 1 0 - 2 5 (calc)    TOTAL BILIRUBIN 0 4 0 2 - 1 2 mg/dL    Alkaline Phosphatase 84 35 - 144 U/L    AST 15 10 - 35 U/L    ALT 21 9 - 46 U/L   Lipid panel    Collection Time: 01/18/22  2:16 PM   Result Value Ref Range    Total Cholesterol 226 (H) <200 mg/dL    HDL 45 > OR = 40 mg/dL    Triglycerides 336 (H) <150 mg/dL    LDL Calculated 133 (H) mg/dL (calc)    Chol HDLC Ratio 5 0 (H) <5 0 (calc)    Non-HDL Cholesterol 181 (H) <130 mg/dL (calc)   PSA, total and free    Collection Time: 01/18/22  2:16 PM   Result Value Ref Range    Prostate Specific Antigen Total 1 0 < OR = 4 0 ng/mL    PSA, Free 0 2 ng/mL    PSA, Free Pct 20 (L) >25 % (calc)   TSH, 3rd generation    Collection Time: 01/18/22  2:16 PM   Result Value Ref Range    TSH 1 92 0 40 - 4 50 mIU/L   JAK2 V617F CASCADING REFLEX TO CALR, JAK2 EXON 12, MPL, AND CSF3R    Collection Time: 01/18/22 2:16 PM   Result Value Ref Range    SL AMB CLINICAL INFORMATION see note     SPECIMEN SOURCE Not Given     Block/Sample Number Not given     JAK2 V617F MUTATION Not Detected Not Detected    CALR MUTATION (EXON 9) Not Detected Not Detected    JAK2 Exon 12 Mutation Not Detected Not Detected    MPL EXON 10 MUTATION Not Detected Not Detected    CSF3R EXON 14/17 MUTATION Not Detected Not Detected    Interpretation see note     ASSAY DETAILS SEE BELOW    Test Authorization    Collection Time: 01/18/22  2:16 PM   Result Value Ref Range    Test Name  HEMOGLOBIN A1c JAK2 V617F [facility]     Test Code  496SB [id number]     Client Contact KOLBY NAMAN     Report Always Message Signature      COMMENT     Hemoglobin A1c (w/out EAG)    Collection Time: 01/18/22  2:16 PM   Result Value Ref Range    Hemoglobin A1C TNP % of total Hgb

## 2022-02-08 NOTE — PROGRESS NOTES
BMI Counseling: Body mass index is 34 34 kg/m²  The BMI is above normal  Nutrition recommendations include encouraging healthy choices of fruits and vegetables and moderation in carbohydrate intake  Exercise recommendations include exercising 3-5 times per week  No pharmacotherapy was ordered  Rationale for BMI follow-up plan is due to patient being overweight or obese  Depression Screening and Follow-up Plan: Patient was screened for depression during today's encounter  They screened negative with a PHQ-2 score of 0     FAMILY PRACTICE HEALTH MAINTENANCE OFFICE VISIT  [facility] [place] [address] PHYSICIANS    NAME: Nafisa Martínez  AGE: 61 y o  SEX: male  : 1965     DATE: 2022    Assessment and Plan     Problem List Items Addressed This Visit        Digestive    Gastro-esophageal reflux disease with esophagitis       Endocrine    Type 2 diabetes mellitus without complication, without long-term current use of insulin (HCC)    Relevant Orders    POCT hemoglobin A1c    CT coronary calcium score       Respiratory    Mild intermittent asthma without complication       Cardiovascular and Mediastinum    Essential hypertension    Relevant Orders    POCT ECG (Completed)    CT coronary calcium score       Other    Mixed hyperlipidemia    Relevant Orders    CT coronary calcium score      Other Visit Diagnoses     Routine general medical examination at a health care facility    -  Primary    Screening for hypothyroidism        Colon cancer screening        Relevant Orders    POCT hemoccult screening (Completed)    Encounter for prostate cancer screening                   Return in about 6 months (around 2022) for Recheck          Chief Complaint     Chief Complaint   Patient presents with    Annual Exam       History of Present Illness     [address] RECORD  NO CONCERNS AT THIS TIME        Well Adult Physical   Patient here for a comprehensive physical exam       Diet and Physical Activity  Diet: well balanced diet  Weight concerns: Patient has class 1 obesity (BMI 30-34  9)  Exercise: occasionally      Depression Screen  PHQ-2/9 Depression Screening    Little interest or pleasure in doing things: 0 - not at all  Feeling down, depressed, or hopeless: 0 - not at all  PHQ-2 Score: 0  PHQ-2 Interpretation: Negative depression screen          General Health  Hearing: Normal:  bilateral  Vision: no vision problems and wears glasses  Dental: regular dental visits    Reproductive Health          The following portions of the patient's history were reviewed and updated as appropriate: allergies, current medications, past family history, past medical history, past social history, past surgical history and problem list     Review of Systems     Review of Systems   Constitutional: Negative for chills, fatigue and fever  HENT: Negative for congestion, ear discharge, ear pain, mouth sores, postnasal drip, sore throat and trouble swallowing  Eyes: Negative for pain, discharge and visual disturbance  Respiratory: Negative for cough, shortness of breath and wheezing  Cardiovascular: Negative for chest pain, palpitations and leg swelling  Gastrointestinal: Negative for abdominal distention, abdominal pain, blood in stool, diarrhea and nausea  Endocrine: Negative for polydipsia, polyphagia and polyuria  Genitourinary: Negative for dysuria, frequency, hematuria and urgency  Musculoskeletal: Negative for arthralgias, gait problem and joint swelling  Skin: Negative for pallor and rash  Neurological: Negative for dizziness, syncope, speech difficulty, weakness, light-headedness, numbness and headaches  Hematological: Negative for adenopathy  Psychiatric/Behavioral: Negative for behavioral problems, confusion and sleep disturbance  The patient is not nervous/anxious          Past Medical History     Past Medical History:   Diagnosis Date    Asthma     Diabetes mellitus ([facility] [address] )     Hypertension        Past Surgical History     History reviewed  No pertinent surgical history      Social History     Social History     Socioeconomic History    Marital status: /Civil Union     Spouse name: None    Number of children: None    Years of education: None    Highest education level: None   Occupational History    None   Tobacco Use    Smoking status: Former Smoker     Packs/day: 0 25    Smokeless tobacco: Never Used    Tobacco comment: current some day smoker and former smoker as per [employer]   Vaping Use    Vaping Use: Never used   Substance and Sexual Activity    Alcohol use: Yes     Comment: couple times per month    Drug use: No    Sexual activity: None   Other Topics Concern    None   Social History Narrative    None     Social Determinants of Health     Financial Resource Strain: Not on file   Food Insecurity: Not on file   Transportation Needs: Not on file   Physical Activity: Not on file   Stress: Not on file   Social Connections: Not on file   Intimate Partner Violence: Not on file   Housing Stability: Not on file       Family History     Family History   Problem Relation Age of Onset    Breast cancer Mother     No Known Problems Father     Mental illness Neg Hx     Substance Abuse Neg Hx        Current Medications       Current Outpatient Medications:     albuterol (2 5 mg/3 mL) 0 083 % nebulizer solution, INHALE THE CONTENTS OF 1 VIAL VIA NEBULIZER EVERY 6 HOURS AS NEEDED FOR WHEEZING OR SHORTNESS OF BREATH, Disp: 180 mL, Rfl: 0    albuterol (PROVENTIL HFA,VENTOLIN HFA) 90 mcg/act inhaler, Inhale 2 puffs every 4 (four) hours as needed for wheezing, Disp: 6 7 g, Rfl: 3    amLODIPine (NORVASC) 10 mg tablet, TAKE 1 TABLET BY MOUTH EVERY DAY, Disp: 90 tablet, Rfl: 3    Cholecalciferol (VITAMIN D-3) 5000 units TABS, Take 1 tablet by mouth daily, Disp: , Rfl:     Continuous Blood Gluc Sensor (FreeStyle Calderon 2 Sensor) MISC, , Disp: , Rfl:   cyclobenzaprine (FLEXERIL) 10 mg tablet, Take 1 tablet (10 mg total) by mouth 3 (three) times a day as needed for muscle spasms BRAND ONLY PLEASE, Disp: 45 tablet, Rfl: 1    Farxiga 10 MG TABS, TAKE 1 TABLET BY MOUTH EVERY DAY, Disp: 90 tablet, Rfl: 3    glipiZIDE (GLUCOTROL XL) 10 mg 24 hr tablet, Take 1 tablet (10 mg total) by mouth daily, Disp: 30 tablet, Rfl: 3    glucose blood test strip, Contour Meter, Disp: , Rfl:     MICROLET LANCETS MISC, Microlet Lancet, Disp: , Rfl:     Mometasone Furoate (Asmanex HFA) 100 MCG/ACT AERO, Inhale 1 puff (100 mcg total) 2 (two) times a day (Patient taking differently: Inhale 1 puff daily  ), Disp: 13 g, Rfl: 3    Ozempic, 1 MG/DOSE, 4 MG/3ML SOPN injection pen, Inject 1 mg under the skin once a week  , Disp: , Rfl:     pantoprazole (PROTONIX) 40 mg tablet, Take 1 tablet by mouth daily, Disp: , Rfl:      Allergies     No Known Allergies    Objective     /94 (BP Location: Left arm, Patient Position: Sitting, Cuff Size: Adult)   Pulse 80   Temp 97 7 °F (36 5 °C) (Temporal)   Resp 16   Ht 5' 6 5" (1 689 m)   Wt 98 kg (216 lb)   SpO2 98%   BMI 34 34 kg/m²      Physical Exam  Constitutional:       Appearance: He is well-developed  HENT:      Head: Normocephalic and atraumatic  Right Ear: External ear normal       Left Ear: External ear normal       Nose: Nose normal    Eyes:      General:         Right eye: No discharge  Left eye: No discharge  Conjunctiva/sclera: Conjunctivae normal       Pupils: Pupils are equal, round, and reactive to light  Neck:      Thyroid: No thyromegaly  Vascular: No JVD  Cardiovascular:      Rate and Rhythm: Normal rate and regular rhythm  Heart sounds: Normal heart sounds  No murmur heard  Pulmonary:      Effort: Pulmonary effort is normal       Breath sounds: Normal breath sounds  No wheezing or rales  Abdominal:      General: Bowel sounds are normal       Palpations: Abdomen is soft  There is no mass  Tenderness: There is no abdominal tenderness  There is no guarding or rebound  Comments: OBESE   Genitourinary:     Penis: Normal        Prostate: Normal       Rectum: Normal  Guaiac result negative  Musculoskeletal:         General: No tenderness or deformity  Normal range of motion  Cervical back: Neck supple  Lymphadenopathy:      Cervical: No cervical adenopathy  Skin:     General: Skin is warm and dry  Findings: No erythema or rash  Neurological:      Mental Status: He is alert and oriented to person, place, and time  Cranial Nerves: No cranial nerve deficit  Motor: No abnormal muscle tone  Coordination: Coordination normal       Deep Tendon Reflexes: Reflexes are normal and symmetric  Psychiatric:         Behavior: Behavior normal          Thought Content:  Thought content normal          Judgment: Judgment normal            No exam data present    Health Maintenance     Health Maintenance   Topic Date Due    Pneumococcal Vaccine: Pediatrics (0 to 5 Years) and At-Risk Patients (6 to 59 Years) (1 of 2 - PPSV23) Never done    PT PLAN OF CARE  09/16/2020    COVID-19 Vaccine (3 - Booster for Moderna series) 07/27/2021    Colorectal Cancer Screening  12/30/2021    DM Eye Exam  01/22/2022    BMI: Followup Plan  06/23/2022    URINE MICROALBUMIN  06/09/2022    Diabetic Foot Exam  06/23/2022    HEMOGLOBIN A1C  07/18/2022    Depression Screening  02/08/2023    BMI: Adult  02/08/2023    Annual Physical  02/08/2023    DTaP,Tdap,and Td Vaccines (2 - Td or Tdap) 09/15/2026    HIV Screening  Completed    Hepatitis C Screening  Completed    Influenza Vaccine  Completed    HIB Vaccine  Aged Out    Hepatitis B Vaccine  Aged Out    IPV Vaccine  Aged Out    Hepatitis A Vaccine  Aged Out    Meningococcal ACWY Vaccine  Aged Out    HPV Vaccine  Aged Out     Immunization History   Administered Date(s) Administered    COVID-19 MODERNA VACC 0 5 ML IM 01/30/2021, 02/27/2021    Influenza Quadrivalent, 6-35 Months IM 09/27/2017    Influenza, recombinant, quadrivalent,injectable, preservative free 10/24/2018, 10/11/2019, 09/15/2020, 09/30/2021    Tdap 09/15/2016       Alisha Lynch MD  [facility]

## 2022-02-08 NOTE — PATIENT INSTRUCTIONS
DISCUSSED HEALTH MAINTENENCE ISSUES  BW WILL BE OBTAINED  ENCOURAGED HEALTHY DIET AND EXERCISE  COLONOSCOPY WILL BE ORDERED  RV FOR ANNUAL HEALTH EXAM IN 1 YEAR  RV SOONER IF THERE ARE ANY CONCERNS      RV 3M    Recent Results (from the past 672 hour(s))   CBC and differential    Collection Time: 01/18/22  2:16 PM   Result Value Ref Range    White Blood Cell Count 5 1 3 8 - 10 8 Thousand/uL    Red Blood Cell Count 6 63 (H) 4 20 - 5 80 Million/uL    Hemoglobin 17 7 (H) 13 2 - 17 1 g/dL    HCT 53 2 (H) 38 5 - 50 0 %    MCV 80 2 80 0 - 100 0 fL    MCH 26 7 (L) 27 0 - 33 0 pg    MCHC 33 3 32 0 - 36 0 g/dL    RDW 14 2 11 0 - 15 0 %    Platelet Count 769 601 - 400 Thousand/uL    SL AMB MPV 9 2 7 5 - 12 5 fL    Neutrophils (Absolute) 2,458 1,500 - 7,800 cells/uL    Lymphocytes (Absolute) 2,086 850 - 3,900 cells/uL    Monocytes (Absolute) 423 200 - 950 cells/uL    Eosinophils (Absolute) 92 15 - 500 cells/uL    Basophils ABS 41 0 - 200 cells/uL    Neutrophils 48 2 %    Lymphocytes 40 9 %    Monocytes 8 3 %    Eosinophils 1 8 %    Basophils PCT 0 8 %   Comprehensive metabolic panel    Collection Time: 01/18/22  2:16 PM   Result Value Ref Range    Glucose, Random 146 (H) 65 - 139 mg/dL    BUN 12 7 - 25 mg/dL    Creatinine 0 85 0 70 - 1 33 mg/dL    eGFR Non  96 > OR = 60 mL/min/1 73m2    eGFR  111 > OR = 60 mL/min/1 73m2    SL AMB BUN/CREATININE RATIO NOT APPLICABLE 6 - 22 (calc)    Sodium 138 135 - 146 mmol/L    Potassium 4 1 3 5 - 5 3 mmol/L    Chloride 104 98 - 110 mmol/L    CO2 25 20 - 32 mmol/L    Calcium 9 4 8 6 - 10 3 mg/dL    Protein, Total 7 4 6 1 - 8 1 g/dL    Albumin 4 4 3 6 - 5 1 g/dL    Globulin 3 0 1 9 - 3 7 g/dL (calc)    Albumin/Globulin Ratio 1 5 1 0 - 2 5 (calc)    TOTAL BILIRUBIN 0 4 0 2 - 1 2 mg/dL    Alkaline Phosphatase 84 35 - 144 U/L    AST 15 10 - 35 U/L    ALT 21 9 - 46 U/L   Lipid panel    Collection Time: 01/18/22  2:16 PM   Result Value Ref Range    Total Cholesterol Problem: Goal Outcome Summary  Goal: Goal Outcome Summary  Physical Therapy: Order received, evaluation completed and treatment initiated. Patient is a 61 year old female with history of seizures who presented to ED with multiple spells of tensing of the whole body followed by obtundation. Pt is currently visiting the area from [place] and has been staying at a hotel. At baseline, patient reports that she lives with her SO in a home with all needs met on the first floor. Pt reports independence with mobility and self cares at baseline.     Discharge Planner PT   Patient plan for discharge: Hoping to leave hospital tomorrow to get on airplane  Current status: Pt requires SBA for bed mobility, good sitting balance at EOB. Pt performs sit to/from stand and stand pivot transfer with CGA. Pt able to ambulate 15' with IV pole with min assist, pt with very slow and deliberate gait pattern, requiring standing rest breaks during short ambulation distance.   Barriers to return to prior living situation: Decreased activity tolerance, level of assist, does not live local, no support in [facility], fall risk, limited ambulation.  Recommendations for discharge: Based on current level of mobility, patient could potentially need TCU to increase mobility prior to safely traveling home. However, if patient is able to demonstrate ability to safely transfer and ambulate longer distances, patient may be safe to discharge to travel home.  Rationale for recommendations: Pt currently with limited ambulation distance and is a fall risk if mobilizing independently.       Entered by: Dottie Colon 09/16/2017 5:49 PM              226 (H) <200 mg/dL    HDL 45 > OR = 40 mg/dL    Triglycerides 336 (H) <150 mg/dL    LDL Calculated 133 (H) mg/dL (calc)    Chol HDLC Ratio 5 0 (H) <5 0 (calc)    Non-HDL Cholesterol 181 (H) <130 mg/dL (calc)   PSA, total and free    Collection Time: 01/18/22  2:16 PM   Result Value Ref Range    Prostate Specific Antigen Total 1 0 < OR = 4 0 ng/mL    PSA, Free 0 2 ng/mL    PSA, Free Pct 20 (L) >25 % (calc)   TSH, 3rd generation    Collection Time: 01/18/22  2:16 PM   Result Value Ref Range    TSH 1 92 0 40 - 4 50 mIU/L   JAK2 V617F CASCADING REFLEX TO CALR, JAK2 EXON 12, MPL, AND CSF3R    Collection Time: 01/18/22  2:16 PM   Result Value Ref Range    SL AMB CLINICAL INFORMATION see note     SPECIMEN SOURCE Not Given     Block/Sample Number Not given     JAK2 V617F MUTATION Not Detected Not Detected    CALR MUTATION (EXON 9) Not Detected Not Detected    JAK2 Exon 12 Mutation Not Detected Not Detected    MPL EXON 10 MUTATION Not Detected Not Detected    CSF3R EXON 14/17 MUTATION Not Detected Not Detected    Interpretation see note     ASSAY DETAILS SEE BELOW    Test Authorization    Collection Time: 01/18/22  2:16 PM   Result Value Ref Range    Test Name  HEMOGLOBIN A1c JAK2 V617F JIM     Test Code  496SB 28834KLPY     Client Contact KOLBY NAMAN     Report Always Message Signature      COMMENT     Hemoglobin A1c (w/out EAG)    Collection Time: 01/18/22  2:16 PM   Result Value Ref Range    Hemoglobin A1C TNP % of total Hgb

## 2022-02-09 ENCOUNTER — TELEPHONE (OUTPATIENT)
Dept: ADMINISTRATIVE | Facility: OTHER | Age: 57
End: 2022-02-09

## 2022-02-09 NOTE — TELEPHONE ENCOUNTER
Upon review of the In Basket request and the patient's chart, initial outreach has been made via fax, please see Contacts section for details       Thank you  Cameron Coates MD  [phone] Fax [phone]    [address] [phone]  Fax: [phone]

## 2022-02-09 NOTE — TELEPHONE ENCOUNTER
----- Message from Yolanda Stafford sent at 2/8/2022 12:27 PM EST -----  Regarding: care gap request - Covid booster, colonoscopy and A1C  02/08/22 12:27 PM    Hello, our patient attached above has had Immunization(s) completed/performed  Please assist in updating the patient chart by making an External outreach to [facility] [employer] [employer] [facility] located in [place], [place]  The date of service is 2021 02/08/22 12:28 PM    Hello, our patient Poonam Oglesby has had CRC: Colonoscopy completed/performed  Please assist in updating the patient chart by making an External outreach to [facility]gr[facility] facility located in [place], [place]  The date of service is [id number] 02/08/22 12:37 PM    Hello, our patient Poonam Oglesby has had Hemoglobin A1c completed/performed  Please assist in updating the patient chart by pulling the document from the Media Tab  The date of service is 1/18/2022       Thank you,  Yolanda Stafford  [address]

## 2022-02-09 NOTE — LETTER
Vaccination Request Form: [id number] aka SARS-CoV-2 ([address] [facility] or [employer])      Date Requested: 22  Patient: Melissa Chambers  Patient : 1965   Referring Provider: Isa Neil MD       The above patient has informed us that they have had their   most recent COVID-19 aka SARS-CoV-2 ([address] or [facility] or [employer]) administered at your facility  Please   complete this form and attach all corresponding documentation  Date of Vaccine(s) Given  ______________________________    Lot Number(s) _______________________________________    Manufacture(s) ______________________________________    Dose Amount (s) _____________________________________    Expiration Date(s) ____________________________________    Comments __________________________________________________________  ____________________________________________________________________  ____________________________________________________________________  ____________________________________________________________________    Administering Facility  ________________________________________________    Vaccine Administered By (print name) ___________________________________      Form Completed By (print name) _______________________________________      Signature ___________________________________________________________      These reports are needed for  compliance  Please fax this completed form and a copy of the Vaccine Document(s) to our office located at [place] [address] as soon as possible to [phone] kinza Dooleyene: Phone [phone]    We thank you for your assistance in treating our mutual patient

## 2022-02-09 NOTE — LETTER
Procedure Request Form: Colonoscopy      Date Requested: 22  Patient: Rubye Cos  Patient : 1965   Referring Provider: Jones Bonds, MD        Date of Procedure _______/ report______________________       The above patient has informed us that they have completed their   most recent Colonoscopy at your facility  Please complete   this form and attach all corresponding procedure reports/results  Comments __________________________________________________________  ____________________________________________________________________  ____________________________________________________________________  ____________________________________________________________________    Facility Completing Procedure _________________________________________    Form Completed By (print name) _______________________________________      Signature __________________________________________________________      These reports are needed for  compliance  Please fax this completed form and a copy of the procedure report to our office located at [place] [address] as soon as possible to [phone] attention Yogesh Clay: Phone [phone]    We thank you for your assistance in treating our mutual patient

## 2022-02-09 NOTE — LETTER
Procedure Request Form: Colonoscopy      Date Requested: 22  Patient: Tima Nathanene  Patient : 1965   Referring Provider: Phil Ibarra MD   2nd Request        Date of Procedure ________ report______________________       The above patient has informed us that they have completed their   most recent Colonoscopy at your facility  Please complete   this form and attach all corresponding procedure reports/results  Comments __________________________________________________________  ____________________________________________________________________  ____________________________________________________________________  ____________________________________________________________________    Facility Completing Procedure _________________________________________    Form Completed By (print name) _______________________________________      Signature __________________________________________________________      These reports are needed for  compliance  Please fax this completed form and a copy of the procedure report to our office located at [place] [address] as soon as possible to [phone] attention Conception Franc: Phone [phone]    We thank you for your assistance in treating our mutual patient

## 2022-02-09 NOTE — TELEPHONE ENCOUNTER
Upon review of the In Basket request we found A1c already up to date in     Any additional questions or concerns should be emailed to the Practice Liaisons via [email or web address] email, please do not reply via In Basket      Thank you  Keiry Duke

## 2022-02-14 NOTE — TELEPHONE ENCOUNTER
As a follow-up, a second attempt has been made for outreach via fax, please see Contacts section for details      Thank you  Keiry Fuchs MD  [phone] Fax [phone]

## 2022-02-14 NOTE — TELEPHONE ENCOUNTER
Upon review of the In Basket request we were able to locate, review, and update the patient chart as requested for Immunization(s) Covid  Any additional questions or concerns should be emailed to the Practice Liaisons via [email or web address] email, please do not reply via In Basket      Thank you  Keiry Alcaraz

## 2022-02-15 NOTE — TELEPHONE ENCOUNTER
Upon review of the In Basket request we were able to locate, review, and update the patient chart as requested for CRC: Colonoscopy  Any additional questions or concerns should be emailed to the Practice Liaisons via [email or web address] email, please do not reply via In Basket      Thank you  Maury Elias [place]

## 2022-03-11 ENCOUNTER — TELEPHONE (OUTPATIENT)
Dept: FAMILY MEDICINE CLINIC | Facility: CLINIC | Age: 57
End: 2022-03-11

## 2022-03-11 NOTE — TELEPHONE ENCOUNTER
Stated that he spoke to you over the weekend and you were going to send a new rx for glipizide increasing 20 mg      [facility]

## 2022-03-30 ENCOUNTER — HOSPITAL ENCOUNTER (OUTPATIENT)
Dept: CT IMAGING | Facility: HOSPITAL | Age: 57
Discharge: HOME | End: 2022-03-30
Payer: COMMERCIAL

## 2022-03-30 DIAGNOSIS — I10 ESSENTIAL HYPERTENSION: ICD-10-CM

## 2022-03-30 DIAGNOSIS — E78.2 MIXED HYPERLIPIDEMIA: ICD-10-CM

## 2022-03-30 DIAGNOSIS — E11.9 TYPE 2 DIABETES MELLITUS WITHOUT COMPLICATION, WITHOUT LONG-TERM CURRENT USE OF INSULIN (HCC): ICD-10-CM

## 2022-03-30 PROCEDURE — 75571 CT HRT W/O DYE W/CA TEST: CPT

## 2022-03-30 PROCEDURE — G1004 CDSM NDSC: HCPCS

## 2022-04-15 DIAGNOSIS — E11.9 TYPE 2 DIABETES MELLITUS WITHOUT COMPLICATION, WITHOUT LONG-TERM CURRENT USE OF INSULIN (HCC): ICD-10-CM

## 2022-04-15 RX ORDER — DAPAGLIFLOZIN 10 MG/1
TABLET, FILM COATED ORAL
Qty: 90 TABLET | Refills: 3 | Status: SHIPPED | OUTPATIENT
Start: 2022-04-15

## 2022-06-28 DIAGNOSIS — E11.9 TYPE 2 DIABETES MELLITUS WITHOUT COMPLICATION, WITHOUT LONG-TERM CURRENT USE OF INSULIN (HCC): Primary | ICD-10-CM

## 2022-06-28 DIAGNOSIS — R71.8 ELEVATED HEMATOCRIT: ICD-10-CM

## 2022-06-28 DIAGNOSIS — D58.2 ELEVATED HEMOGLOBIN (HCC): ICD-10-CM

## 2022-06-28 DIAGNOSIS — E78.01 FAMILIAL HYPERCHOLESTEROLEMIA: ICD-10-CM

## 2022-06-28 DIAGNOSIS — E78.2 MIXED HYPERLIPIDEMIA: ICD-10-CM

## 2022-06-28 DIAGNOSIS — I10 ESSENTIAL HYPERTENSION: ICD-10-CM

## 2022-08-09 ENCOUNTER — RA CDI HCC (OUTPATIENT)
Dept: OTHER | Facility: HOSPITAL | Age: 57
End: 2022-08-09

## 2022-08-09 NOTE — PROGRESS NOTES
[facility]  coding opportunities          Chart Reviewed number of suggestions sent to Provider: 2   E11 65  Previously reported, elevated A1c's  J45 20 On problem list not on BPA      Patients Insurance        Commercial Insurance: [employer]

## 2022-08-16 ENCOUNTER — OFFICE VISIT (OUTPATIENT)
Dept: FAMILY MEDICINE CLINIC | Facility: CLINIC | Age: 57
End: 2022-08-16
Payer: COMMERCIAL

## 2022-08-16 VITALS
HEART RATE: 84 BPM | TEMPERATURE: 97.7 F | RESPIRATION RATE: 12 BRPM | BODY MASS INDEX: 34.06 KG/M2 | WEIGHT: 217 LBS | DIASTOLIC BLOOD PRESSURE: 82 MMHG | HEIGHT: 67 IN | OXYGEN SATURATION: 95 % | SYSTOLIC BLOOD PRESSURE: 128 MMHG

## 2022-08-16 DIAGNOSIS — E11.9 TYPE 2 DIABETES MELLITUS WITHOUT COMPLICATION, WITHOUT LONG-TERM CURRENT USE OF INSULIN (HCC): Primary | ICD-10-CM

## 2022-08-16 DIAGNOSIS — I10 ESSENTIAL HYPERTENSION: ICD-10-CM

## 2022-08-16 DIAGNOSIS — M79.645 PAIN OF LEFT THUMB: ICD-10-CM

## 2022-08-16 DIAGNOSIS — E78.2 MIXED HYPERLIPIDEMIA: ICD-10-CM

## 2022-08-16 LAB
ALBUMIN SERPL-MCNC: 4.2 G/DL (ref 3.6–5.1)
ALBUMIN/GLOB SERPL: 1.6 (CALC) (ref 1–2.5)
ALP SERPL-CCNC: 85 U/L (ref 35–144)
ALT SERPL-CCNC: 19 U/L (ref 9–46)
AST SERPL-CCNC: 16 U/L (ref 10–35)
BASOPHILS # BLD AUTO: 39 CELLS/UL (ref 0–200)
BASOPHILS NFR BLD AUTO: 0.7 %
BILIRUB SERPL-MCNC: 0.4 MG/DL (ref 0.2–1.2)
BUN SERPL-MCNC: 16 MG/DL (ref 7–25)
BUN/CREAT SERPL: ABNORMAL (CALC) (ref 6–22)
CALCIUM SERPL-MCNC: 9.5 MG/DL (ref 8.6–10.3)
CHLORIDE SERPL-SCNC: 102 MMOL/L (ref 98–110)
CHOLEST SERPL-MCNC: 223 MG/DL
CHOLEST/HDLC SERPL: 5 (CALC)
CO2 SERPL-SCNC: 28 MMOL/L (ref 20–32)
CREAT SERPL-MCNC: 0.96 MG/DL (ref 0.7–1.3)
EOSINOPHIL # BLD AUTO: 242 CELLS/UL (ref 15–500)
EOSINOPHIL NFR BLD AUTO: 4.4 %
ERYTHROCYTE [DISTWIDTH] IN BLOOD BY AUTOMATED COUNT: 14.5 % (ref 11–15)
GFR/BSA.PRED SERPLBLD CYS-BASED-ARV: 92 ML/MIN/1.73M2
GLOBULIN SER CALC-MCNC: 2.7 G/DL (CALC) (ref 1.9–3.7)
GLUCOSE SERPL-MCNC: 137 MG/DL (ref 65–99)
HCT VFR BLD AUTO: 51.8 % (ref 38.5–50)
HDLC SERPL-MCNC: 45 MG/DL
HGB BLD-MCNC: 17.2 G/DL (ref 13.2–17.1)
LDLC SERPL CALC-MCNC: 128 MG/DL (CALC)
LYMPHOCYTES # BLD AUTO: 2459 CELLS/UL (ref 850–3900)
LYMPHOCYTES NFR BLD AUTO: 44.7 %
MCH RBC QN AUTO: 27.1 PG (ref 27–33)
MCHC RBC AUTO-ENTMCNC: 33.2 G/DL (ref 32–36)
MCV RBC AUTO: 81.7 FL (ref 80–100)
MONOCYTES # BLD AUTO: 484 CELLS/UL (ref 200–950)
MONOCYTES NFR BLD AUTO: 8.8 %
NEUTROPHILS # BLD AUTO: 2277 CELLS/UL (ref 1500–7800)
NEUTROPHILS NFR BLD AUTO: 41.4 %
NONHDLC SERPL-MCNC: 178 MG/DL (CALC)
PLATELET # BLD AUTO: 300 THOUSAND/UL (ref 140–400)
PMV BLD REES-ECKER: 9.2 FL (ref 7.5–12.5)
POTASSIUM SERPL-SCNC: 4 MMOL/L (ref 3.5–5.3)
PROT SERPL-MCNC: 6.9 G/DL (ref 6.1–8.1)
RBC # BLD AUTO: 6.34 MILLION/UL (ref 4.2–5.8)
SODIUM SERPL-SCNC: 137 MMOL/L (ref 135–146)
TRIGL SERPL-MCNC: 350 MG/DL
WBC # BLD AUTO: 5.5 THOUSAND/UL (ref 3.8–10.8)

## 2022-08-16 PROCEDURE — 3074F SYST BP LT 130 MM HG: CPT | Performed by: FAMILY MEDICINE

## 2022-08-16 PROCEDURE — 99214 OFFICE O/P EST MOD 30 MIN: CPT | Performed by: FAMILY MEDICINE

## 2022-08-16 PROCEDURE — 3079F DIAST BP 80-89 MM HG: CPT | Performed by: FAMILY MEDICINE

## 2022-08-16 NOTE — PATIENT INSTRUCTIONS
CONTINUE CURRENT TREATMENT PLAN  MONITOR DIETARY SODIUM, CHOL, AND CARBOHYDRATE INTAKE  ENCOURAGE PHYSICAL ACTIVITY  FOOT CARE    RV 3 M, SOONER PRN      Recent Results (from the past 672 hour(s))   Comprehensive metabolic panel    Collection Time: 08/15/22  7:50 AM   Result Value Ref Range    Glucose, Random 137 (H) 65 - 99 mg/dL    BUN 16 7 - 25 mg/dL    Creatinine 0 96 0 70 - 1 30 mg/dL    eGFR 92 > OR = 60 mL/min/1 73m2    SL AMB BUN/CREATININE RATIO NOT APPLICABLE 6 - 22 (calc)    Sodium 137 135 - 146 mmol/L    Potassium 4 0 3 5 - 5 3 mmol/L    Chloride 102 98 - 110 mmol/L    CO2 28 20 - 32 mmol/L    Calcium 9 5 8 6 - 10 3 mg/dL    Protein, Total 6 9 6 1 - 8 1 g/dL    Albumin 4 2 3 6 - 5 1 g/dL    Globulin 2 7 1 9 - 3 7 g/dL (calc)    Albumin/Globulin Ratio 1 6 1 0 - 2 5 (calc)    TOTAL BILIRUBIN 0 4 0 2 - 1 2 mg/dL    Alkaline Phosphatase 85 35 - 144 U/L    AST 16 10 - 35 U/L    ALT 19 9 - 46 U/L   Lipid panel    Collection Time: 08/15/22  7:50 AM   Result Value Ref Range    Total Cholesterol 223 (H) <200 mg/dL    HDL 45 > OR = 40 mg/dL    Triglycerides 350 (H) <150 mg/dL    LDL Calculated 128 (H) mg/dL (calc)    Chol HDLC Ratio 5 0 (H) <5 0 (calc)    Non-HDL Cholesterol 178 (H) <130 mg/dL (calc)   CBC and differential    Collection Time: 08/15/22  7:50 AM   Result Value Ref Range    White Blood Cell Count 5 5 3 8 - 10 8 Thousand/uL    Red Blood Cell Count 6 34 (H) 4 20 - 5 80 Million/uL    Hemoglobin 17 2 (H) 13 2 - 17 1 g/dL    HCT 51 8 (H) 38 5 - 50 0 %    MCV 81 7 80 0 - 100 0 fL    MCH 27 1 27 0 - 33 0 pg    MCHC 33 2 32 0 - 36 0 g/dL    RDW 14 5 11 0 - 15 0 %    Platelet Count 057 441 - 400 Thousand/uL    SL AMB MPV 9 2 7 5 - 12 5 fL    Neutrophils (Absolute) 2,277 1,500 - 7,800 cells/uL    Lymphocytes (Absolute) 2,459 850 - 3,900 cells/uL    Monocytes (Absolute) 484 200 - 950 cells/uL    Eosinophils (Absolute) 242 15 - 500 cells/uL    Basophils ABS 39 0 - 200 cells/uL    Neutrophils 41 4 % Lymphocytes 44 7 %    Monocytes 8 8 %    Eosinophils 4 4 %    Basophils PCT 0 7 %

## 2022-08-16 NOTE — ASSESSMENT & PLAN NOTE
Lab Results   Component Value Date    HGBA1C TNP 01/18/2022     COMPLIANT WITH MEDICATION  DENIES ANY NUMBNESS, TINGLING IN FEET    - DISCUSSED DIETARY MODIFICATION OF CARBOHYDRATES  - HGB A1C AND URINE STUDIES DUE TODAY  - FOOT CARE  - RV 3 MONTHS

## 2022-08-16 NOTE — PROGRESS NOTES
Assessment/Plan:    Type 2 diabetes mellitus without complication, without long-term current use of insulin (HCC)    Lab Results   Component Value Date    HGBA1C TNP 01/18/2022     COMPLIANT WITH MEDICATION  DENIES ANY NUMBNESS, TINGLING IN FEET    - DISCUSSED DIETARY MODIFICATION OF CARBOHYDRATES  - HGB A1C AND URINE STUDIES DUE TODAY  - FOOT CARE  - RV 3 MONTHS        Essential hypertension  STABLE  DENIES ANY CP, SOB, PALPITATIONS, OR HEADACHE  NOTES NO WATER RETENTION  COMPLIANT WITH MEDICATION  NO CONCERNS    - CONTINUE CURRENT TREATMENT PLAN  - MONITOR DIETARY SODIUM INTAKE  - ENCOURAGE PHYSICAL ACTIVITY  - RV 3 MONTHS      Mixed hyperlipidemia  WATCHING CHOLESTEROL INTAKE  COMPLIANT WITH MEDICATION  NO CONCERNS    - CONTINUE TO MONITOR DIETARY CHOL INTAKE  - CONTINUE CURRENT MEDICATION  - ENCOURAGED PHYSICAL ACTIVITY         Diagnoses and all orders for this visit:    Type 2 diabetes mellitus without complication, without long-term current use of insulin (HCC)  -     Cancel: POCT hemoglobin A1c    Essential hypertension    Mixed hyperlipidemia    Pain of left thumb  -     XR thumb left first digit-min 2v;  Future        Patient Instructions   CONTINUE CURRENT TREATMENT PLAN  MONITOR DIETARY SODIUM, CHOL, AND CARBOHYDRATE INTAKE  ENCOURAGE PHYSICAL ACTIVITY  FOOT CARE    RV 3 M, SOONER PRN      Recent Results (from the past 672 hour(s))   Comprehensive metabolic panel    Collection Time: 08/15/22  7:50 AM   Result Value Ref Range    Glucose, Random 137 (H) 65 - 99 mg/dL    BUN 16 7 - 25 mg/dL    Creatinine 0 96 0 70 - 1 30 mg/dL    eGFR 92 > OR = 60 mL/min/1 73m2    SL AMB BUN/CREATININE RATIO NOT APPLICABLE 6 - 22 (calc)    Sodium 137 135 - 146 mmol/L    Potassium 4 0 3 5 - 5 3 mmol/L    Chloride 102 98 - 110 mmol/L    CO2 28 20 - 32 mmol/L    Calcium 9 5 8 6 - 10 3 mg/dL    Protein, Total 6 9 6 1 - 8 1 g/dL    Albumin 4 2 3 6 - 5 1 g/dL    Globulin 2 7 1 9 - 3 7 g/dL (calc)    Albumin/Globulin Ratio 1 6 1 0 - 2 5 (calc)    TOTAL BILIRUBIN 0 4 0 2 - 1 2 mg/dL    Alkaline Phosphatase 85 35 - 144 U/L    AST 16 10 - 35 U/L    ALT 19 9 - 46 U/L   Lipid panel    Collection Time: 08/15/22  7:50 AM   Result Value Ref Range    Total Cholesterol 223 (H) <200 mg/dL    HDL 45 > OR = 40 mg/dL    Triglycerides 350 (H) <150 mg/dL    LDL Calculated 128 (H) mg/dL (calc)    Chol HDLC Ratio 5 0 (H) <5 0 (calc)    Non-HDL Cholesterol 178 (H) <130 mg/dL (calc)   CBC and differential    Collection Time: 08/15/22  7:50 AM   Result Value Ref Range    White Blood Cell Count 5 5 3 8 - 10 8 Thousand/uL    Red Blood Cell Count 6 34 (H) 4 20 - 5 80 Million/uL    Hemoglobin 17 2 (H) 13 2 - 17 1 g/dL    HCT 51 8 (H) 38 5 - 50 0 %    MCV 81 7 80 0 - 100 0 fL    MCH 27 1 27 0 - 33 0 pg    MCHC 33 2 32 0 - 36 0 g/dL    RDW 14 5 11 0 - 15 0 %    Platelet Count 416 177 - 400 Thousand/uL    SL AMB MPV 9 2 7 5 - 12 5 fL    Neutrophils (Absolute) 2,277 1,500 - 7,800 cells/uL    Lymphocytes (Absolute) 2,459 850 - 3,900 cells/uL    Monocytes (Absolute) 484 200 - 950 cells/uL    Eosinophils (Absolute) 242 15 - 500 cells/uL    Basophils ABS 39 0 - 200 cells/uL    Neutrophils 41 4 %    Lymphocytes 44 7 %    Monocytes 8 8 %    Eosinophils 4 4 %    Basophils PCT 0 7 %         Return in about 3 months (around 11/16/2022) for DIABETIC REVISIT  Subjective:      Patient ID: Stoney Nieto is a 61 y o  male  Chief Complaint   Patient presents with    Follow-up     Pt here for a six month f/u  Pt advised to remove his shoes and socks for a DM foot exam        PATIENT RETURNS FOR ROUTINE EVALUATION OF PATIENT'S MEDICAL ISSUES    INDIVIDUAL MEDICAL ISSUES WITH THEIR CURRENT STATUS, ASSESSMENT AND PLANS ARE LISTED ABOVE          The following portions of the patient's history were reviewed and updated as appropriate: allergies, current medications, past family history, past medical history, past social history, past surgical history and problem list     Review of Systems   Constitutional: Negative for chills, fatigue and fever  HENT: Negative for congestion, ear discharge, ear pain, mouth sores, postnasal drip, sore throat and trouble swallowing  Eyes: Negative for pain, discharge and visual disturbance  Respiratory: Negative for cough, shortness of breath and wheezing  Cardiovascular: Negative for chest pain, palpitations and leg swelling  Gastrointestinal: Negative for abdominal distention, abdominal pain, blood in stool, diarrhea and nausea  Endocrine: Negative for polydipsia, polyphagia and polyuria  Genitourinary: Negative for dysuria, frequency, hematuria and urgency  Musculoskeletal: Negative for arthralgias, gait problem and joint swelling  Skin: Negative for pallor and rash  Neurological: Negative for dizziness, syncope, speech difficulty, weakness, light-headedness, numbness and headaches  Hematological: Negative for adenopathy  Psychiatric/Behavioral: Negative for behavioral problems, confusion and sleep disturbance  The patient is not nervous/anxious            Current Outpatient Medications   Medication Sig Dispense Refill    albuterol (2 5 mg/3 mL) 0 083 % nebulizer solution INHALE THE CONTENTS OF 1 VIAL VIA NEBULIZER EVERY 6 HOURS AS NEEDED FOR WHEEZING OR SHORTNESS OF BREATH 180 mL 0    albuterol (PROVENTIL HFA,VENTOLIN HFA) 90 mcg/act inhaler Inhale 2 puffs every 4 (four) hours as needed for wheezing 6 7 g 3    amLODIPine (NORVASC) 10 mg tablet TAKE 1 TABLET BY MOUTH EVERY DAY 90 tablet 3    Cholecalciferol (VITAMIN D-3) 5000 units TABS Take 1 tablet by mouth daily      Continuous Blood Gluc Sensor (FreeStyle Calderon 2 Sensor) MISC       cyclobenzaprine (FLEXERIL) 10 mg tablet Take 1 tablet (10 mg total) by mouth 3 (three) times a day as needed for muscle spasms BRAND ONLY PLEASE 45 tablet 1    Farxiga 10 MG TABS TAKE 1 TABLET BY MOUTH EVERY DAY 90 tablet 3    glipiZIDE (GLUCOTROL XL) 10 mg 24 hr tablet Take 2 tablets (20 mg total) by mouth daily 180 tablet 1    glucose blood test strip Contour Meter      MICROLET LANCETS MISC Microlet Lancet      Mometasone Furoate (Asmanex HFA) 100 MCG/ACT AERO Inhale 1 puff (100 mcg total) 2 (two) times a day (Patient taking differently: Inhale 1 puff if needed) 13 g 3    Ozempic, 1 MG/DOSE, 4 MG/3ML SOPN injection pen Inject 1 mg under the skin once a week        pantoprazole (PROTONIX) 40 mg tablet Take 1 tablet by mouth daily       No current facility-administered medications for this visit  Objective:    /82 (BP Location: Left arm, Patient Position: Sitting, Cuff Size: Large)   Pulse 84   Temp 97 7 °F (36 5 °C) (Temporal)   Resp 12   Ht 5' 6 5" (1 689 m)   Wt 98 4 kg (217 lb)   SpO2 95%   BMI 34 50 kg/m²        Physical Exam  Constitutional:       Appearance: He is well-developed  HENT:      Head: Normocephalic and atraumatic  Eyes:      General:         Right eye: No discharge  Left eye: No discharge  Conjunctiva/sclera: Conjunctivae normal       Pupils: Pupils are equal, round, and reactive to light  Neck:      Thyroid: No thyromegaly  Vascular: No JVD  Cardiovascular:      Rate and Rhythm: Normal rate and regular rhythm  Pulses: no weak pulses          Dorsalis pedis pulses are 1+ on the right side and 1+ on the left side  Posterior tibial pulses are 1+ on the right side and 1+ on the left side  Heart sounds: Normal heart sounds  No murmur heard  Pulmonary:      Effort: Pulmonary effort is normal       Breath sounds: Normal breath sounds  No wheezing or rales  Abdominal:      General: Bowel sounds are normal       Palpations: Abdomen is soft  There is no mass  Tenderness: There is no abdominal tenderness  There is no guarding or rebound  Comments: MILDLY OBESE     Musculoskeletal:         General: No tenderness or deformity  Normal range of motion  Cervical back: Neck supple     Feet:      Right foot:      Skin integrity: No ulcer, skin breakdown, erythema, warmth, callus or dry skin  Left foot:      Skin integrity: No ulcer, skin breakdown, erythema, warmth, callus or dry skin  Lymphadenopathy:      Cervical: No cervical adenopathy  Skin:     General: Skin is warm and dry  Findings: No erythema or rash  Neurological:      Mental Status: He is alert and oriented to person, place, and time  Psychiatric:         Behavior: Behavior normal          Thought Content: Thought content normal          Judgment: Judgment normal               Patient's shoes and socks removed  Right Foot/Ankle   Right Foot Inspection  Skin Exam: skin normal and skin intact  No dry skin, no warmth, no callus, no erythema, no maceration, no abnormal color, no pre-ulcer, no ulcer and no callus  Toe Exam: ROM and strength within normal limits  Sensory   Monofilament testing: intact    Vascular  The right DP pulse is 1+  The right PT pulse is 1+  Left Foot/Ankle  Left Foot Inspection  Skin Exam: skin normal and skin intact  No dry skin, no warmth, no erythema, no maceration, normal color, no pre-ulcer, no ulcer and no callus  Toe Exam: ROM and strength within normal limits  Sensory   Monofilament testing: intact    Vascular  The left DP pulse is 1+  The left PT pulse is 1+       Assign Risk Category  No deformity present  No loss of protective sensation  No weak pulses  Risk: 0    Lola Gonsalez MD

## 2022-08-16 NOTE — LETTER
SLY BULLUCK      Current Outpatient Medications:     albuterol (2 5 mg/3 mL) 0 083 % nebulizer solution, INHALE THE CONTENTS OF 1 VIAL VIA NEBULIZER EVERY 6 HOURS AS NEEDED FOR WHEEZING OR SHORTNESS OF BREATH, Disp: 180 mL, Rfl: 0    albuterol (PROVENTIL HFA,VENTOLIN HFA) 90 mcg/act inhaler, Inhale 2 puffs every 4 (four) hours as needed for wheezing, Disp: 6 7 g, Rfl: 3    amLODIPine (NORVASC) 10 mg tablet, TAKE 1 TABLET BY MOUTH EVERY DAY, Disp: 90 tablet, Rfl: 3    Cholecalciferol (VITAMIN D-3) 5000 units TABS, Take 1 tablet by mouth daily, Disp: , Rfl:     Continuous Blood Gluc Sensor (FreeStyle Calderon 2 Sensor) MISC, , Disp: , Rfl:     cyclobenzaprine (FLEXERIL) 10 mg tablet, Take 1 tablet (10 mg total) by mouth 3 (three) times a day as needed for muscle spasms BRAND ONLY PLEASE, Disp: 45 tablet, Rfl: 1    Farxiga 10 MG TABS, TAKE 1 TABLET BY MOUTH EVERY DAY, Disp: 90 tablet, Rfl: 3    glipiZIDE (GLUCOTROL XL) 10 mg 24 hr tablet, Take 2 tablets (20 mg total) by mouth daily, Disp: 180 tablet, Rfl: 1    glucose blood test strip, Contour Meter, Disp: , Rfl:     MICROLET LANCETS MISC, Microlet Lancet, Disp: , Rfl:     Mometasone Furoate (Asmanex HFA) 100 MCG/ACT AERO, Inhale 1 puff (100 mcg total) 2 (two) times a day (Patient taking differently: Inhale 1 puff daily  ), Disp: 13 g, Rfl: 3    Ozempic, 1 MG/DOSE, 4 MG/3ML SOPN injection pen, Inject 1 mg under the skin once a week  , Disp: , Rfl:     pantoprazole (PROTONIX) 40 mg tablet, Take 1 tablet by mouth daily, Disp: , Rfl:     Recent Results (from the past 672 hour(s))   Comprehensive metabolic panel    Collection Time: 08/15/22  7:50 AM   Result Value Ref Range    Glucose, Random 137 (H) 65 - 99 mg/dL    BUN 16 7 - 25 mg/dL    Creatinine 0 96 0 70 - 1 30 mg/dL    eGFR 92 > OR = 60 mL/min/1 73m2    SL AMB BUN/CREATININE RATIO NOT APPLICABLE 6 - 22 (calc)    Sodium 137 135 - 146 mmol/L    Potassium 4 0 3 5 - 5 3 mmol/L    Chloride 102 98 - 110 mmol/L CO2 28 20 - 32 mmol/L    Calcium 9 5 8 6 - 10 3 mg/dL    Protein, Total 6 9 6 1 - 8 1 g/dL    Albumin 4 2 3 6 - 5 1 g/dL    Globulin 2 7 1 9 - 3 7 g/dL (calc)    Albumin/Globulin Ratio 1 6 1 0 - 2 5 (calc)    TOTAL BILIRUBIN 0 4 0 2 - 1 2 mg/dL    Alkaline Phosphatase 85 35 - 144 U/L    AST 16 10 - 35 U/L    ALT 19 9 - 46 U/L   Lipid panel    Collection Time: 08/15/22  7:50 AM   Result Value Ref Range    Total Cholesterol 223 (H) <200 mg/dL    HDL 45 > OR = 40 mg/dL    Triglycerides 350 (H) <150 mg/dL    LDL Calculated 128 (H) mg/dL (calc)    Chol HDLC Ratio 5 0 (H) <5 0 (calc)    Non-HDL Cholesterol 178 (H) <130 mg/dL (calc)   CBC and differential    Collection Time: 08/15/22  7:50 AM   Result Value Ref Range    White Blood Cell Count 5 5 3 8 - 10 8 Thousand/uL    Red Blood Cell Count 6 34 (H) 4 20 - 5 80 Million/uL    Hemoglobin 17 2 (H) 13 2 - 17 1 g/dL    HCT 51 8 (H) 38 5 - 50 0 %    MCV 81 7 80 0 - 100 0 fL    MCH 27 1 27 0 - 33 0 pg    MCHC 33 2 32 0 - 36 0 g/dL    RDW 14 5 11 0 - 15 0 %    Platelet Count 839 650 - 400 Thousand/uL    SL AMB MPV 9 2 7 5 - 12 5 fL    Neutrophils (Absolute) 2,277 1,500 - 7,800 cells/uL    Lymphocytes (Absolute) 2,459 850 - 3,900 cells/uL    Monocytes (Absolute) 484 200 - 950 cells/uL    Eosinophils (Absolute) 242 15 - 500 cells/uL    Basophils ABS 39 0 - 200 cells/uL    Neutrophils 41 4 %    Lymphocytes 44 7 %    Monocytes 8 8 %    Eosinophils 4 4 %    Basophils PCT 0 7 %

## 2022-08-17 LAB
ALBUMIN/CREAT UR: 30 MCG/MG CREAT
CREAT UR-MCNC: 56 MG/DL (ref 20–320)
MICROALBUMIN UR-MCNC: 1.7 MG/DL

## 2022-08-17 PROCEDURE — 3061F NEG MICROALBUMINURIA REV: CPT | Performed by: FAMILY MEDICINE

## 2022-08-25 DIAGNOSIS — E11.9 TYPE 2 DIABETES MELLITUS WITHOUT COMPLICATION, WITHOUT LONG-TERM CURRENT USE OF INSULIN (HCC): ICD-10-CM

## 2022-08-25 RX ORDER — GLIPIZIDE 10 MG/1
TABLET, FILM COATED, EXTENDED RELEASE ORAL
Qty: 180 TABLET | Refills: 1 | Status: SHIPPED | OUTPATIENT
Start: 2022-08-25

## 2022-10-26 DIAGNOSIS — E11.9 TYPE 2 DIABETES MELLITUS WITHOUT COMPLICATION, WITHOUT LONG-TERM CURRENT USE OF INSULIN (HCC): Primary | ICD-10-CM

## 2022-10-26 DIAGNOSIS — K21.00 GASTROESOPHAGEAL REFLUX DISEASE WITH ESOPHAGITIS, UNSPECIFIED WHETHER HEMORRHAGE: ICD-10-CM

## 2022-10-26 DIAGNOSIS — E78.2 MIXED HYPERLIPIDEMIA: ICD-10-CM

## 2022-11-18 LAB
ALBUMIN SERPL-MCNC: 4.3 G/DL (ref 3.6–5.1)
ALBUMIN/GLOB SERPL: 1.4 (CALC) (ref 1–2.5)
ALP SERPL-CCNC: 123 U/L (ref 35–144)
ALT SERPL-CCNC: 26 U/L (ref 9–46)
AST SERPL-CCNC: 18 U/L (ref 10–35)
BASOPHILS # BLD AUTO: 32 CELLS/UL (ref 0–200)
BASOPHILS NFR BLD AUTO: 0.7 %
BILIRUB SERPL-MCNC: 0.5 MG/DL (ref 0.2–1.2)
BUN SERPL-MCNC: 13 MG/DL (ref 7–25)
BUN/CREAT SERPL: ABNORMAL (CALC) (ref 6–22)
CALCIUM SERPL-MCNC: 9.9 MG/DL (ref 8.6–10.3)
CHLORIDE SERPL-SCNC: 99 MMOL/L (ref 98–110)
CHOLEST SERPL-MCNC: 225 MG/DL
CHOLEST/HDLC SERPL: 4.9 (CALC)
CO2 SERPL-SCNC: 29 MMOL/L (ref 20–32)
CREAT SERPL-MCNC: 0.95 MG/DL (ref 0.7–1.3)
EOSINOPHIL # BLD AUTO: 122 CELLS/UL (ref 15–500)
EOSINOPHIL NFR BLD AUTO: 2.7 %
ERYTHROCYTE [DISTWIDTH] IN BLOOD BY AUTOMATED COUNT: 13.8 % (ref 11–15)
GFR/BSA.PRED SERPLBLD CYS-BASED-ARV: 93 ML/MIN/1.73M2
GLOBULIN SER CALC-MCNC: 3 G/DL (CALC) (ref 1.9–3.7)
GLUCOSE SERPL-MCNC: 349 MG/DL (ref 65–99)
HCT VFR BLD AUTO: 54.4 % (ref 38.5–50)
HDLC SERPL-MCNC: 46 MG/DL
HGB BLD-MCNC: 17.7 G/DL (ref 13.2–17.1)
LYMPHOCYTES # BLD AUTO: 1625 CELLS/UL (ref 850–3900)
LYMPHOCYTES NFR BLD AUTO: 36.1 %
MCH RBC QN AUTO: 26.6 PG (ref 27–33)
MCHC RBC AUTO-ENTMCNC: 32.5 G/DL (ref 32–36)
MCV RBC AUTO: 81.7 FL (ref 80–100)
MONOCYTES # BLD AUTO: 360 CELLS/UL (ref 200–950)
MONOCYTES NFR BLD AUTO: 8 %
NEUTROPHILS # BLD AUTO: 2363 CELLS/UL (ref 1500–7800)
NEUTROPHILS NFR BLD AUTO: 52.5 %
NONHDLC SERPL-MCNC: 179 MG/DL (CALC)
PLATELET # BLD AUTO: 295 THOUSAND/UL (ref 140–400)
PMV BLD REES-ECKER: 9.7 FL (ref 7.5–12.5)
POTASSIUM SERPL-SCNC: 4.3 MMOL/L (ref 3.5–5.3)
PROT SERPL-MCNC: 7.3 G/DL (ref 6.1–8.1)
RBC # BLD AUTO: 6.66 MILLION/UL (ref 4.2–5.8)
SODIUM SERPL-SCNC: 136 MMOL/L (ref 135–146)
TRIGL SERPL-MCNC: 482 MG/DL
WBC # BLD AUTO: 4.5 THOUSAND/UL (ref 3.8–10.8)

## 2022-11-18 RX ORDER — ATORVASTATIN CALCIUM 10 MG/1
TABLET, FILM COATED ORAL
COMMUNITY
End: 2022-11-21 | Stop reason: HOSPADM

## 2022-11-21 ENCOUNTER — OFFICE VISIT (OUTPATIENT)
Dept: FAMILY MEDICINE CLINIC | Facility: CLINIC | Age: 57
End: 2022-11-21

## 2022-11-21 VITALS
WEIGHT: 221 LBS | RESPIRATION RATE: 12 BRPM | OXYGEN SATURATION: 96 % | DIASTOLIC BLOOD PRESSURE: 84 MMHG | HEART RATE: 84 BPM | SYSTOLIC BLOOD PRESSURE: 118 MMHG | TEMPERATURE: 98.1 F | HEIGHT: 67 IN | BODY MASS INDEX: 34.69 KG/M2

## 2022-11-21 DIAGNOSIS — E11.9 TYPE 2 DIABETES MELLITUS WITHOUT COMPLICATION, WITHOUT LONG-TERM CURRENT USE OF INSULIN (HCC): Primary | ICD-10-CM

## 2022-11-21 DIAGNOSIS — I10 ESSENTIAL HYPERTENSION: ICD-10-CM

## 2022-11-21 DIAGNOSIS — E78.2 MIXED HYPERLIPIDEMIA: ICD-10-CM

## 2022-11-21 RX ORDER — ROSUVASTATIN CALCIUM 5 MG/1
5 TABLET, COATED ORAL DAILY
Qty: 90 TABLET | Refills: 3 | Status: SHIPPED | OUTPATIENT
Start: 2022-11-21

## 2022-11-21 RX ORDER — TIRZEPATIDE 10 MG/.5ML
INJECTION, SOLUTION SUBCUTANEOUS
COMMUNITY
Start: 2022-11-17

## 2022-11-21 NOTE — ASSESSMENT & PLAN NOTE
FOLLOWED BY ENDO    COMPLIANT WITH MEDICATION  DENIES ANY NUMBNESS, TINGLING IN FEET    - DISCUSSED DIETARY MODIFICATION OF CARBOHYDRATES  - HGB A1C AND URINE STUDIES DUE TODAY  - FOOT CARE  - RV 3 MONTHS

## 2022-11-21 NOTE — PROGRESS NOTES
Name: Jany Ding      : 1965      MRN: [id number]  Encounter Provider: Eliane Gallardo MD  Encounter Date: 2022   Encounter department: [id number]     1  Type 2 diabetes mellitus without complication, without long-term current use of insulin (HCC)  Assessment & Plan:  FOLLOWED BY ENDO    COMPLIANT WITH MEDICATION  DENIES ANY NUMBNESS, TINGLING IN FEET    - DISCUSSED DIETARY MODIFICATION OF CARBOHYDRATES  - HGB A1C AND URINE STUDIES DUE TODAY  - FOOT CARE  - RV 3 MONTHS        Orders:  -     Hemoglobin A1C; Future; Expected date: 2023  -     Microalbumin, Random Urine (W/Creatinine) ([facility] ONLY); Future; Expected date: 2023    2  Essential hypertension  Assessment & Plan:  STABLE  DENIES ANY CP, SOB, PALPITATIONS, OR HEADACHE  NOTES NO WATER RETENTION  COMPLIANT WITH MEDICATION  NO CONCERNS    - CONTINUE CURRENT TREATMENT PLAN  - MONITOR DIETARY SODIUM INTAKE  - ENCOURAGE PHYSICAL ACTIVITY  - RV 3 MONTHS      Orders:  -     Comprehensive metabolic panel; Future; Expected date: 2023  -     Comprehensive metabolic panel    3  Mixed hyperlipidemia  Assessment & Plan:  WATCHING CHOLESTEROL INTAKE  COMPLIANT WITH MEDICATION  NO CONCERNS    - CONTINUE TO MONITOR DIETARY CHOL INTAKE  - CONTINUE CURRENT MEDICATION  - ENCOURAGED PHYSICAL ACTIVITY        Orders:  -     rosuvastatin (CRESTOR) 5 mg tablet; Take 1 tablet (5 mg total) by mouth daily  -     Comprehensive metabolic panel; Future; Expected date: 2023  -     Lipid Panel with Direct LDL reflex; Future; Expected date: 2023  -     Comprehensive metabolic panel  -     Lipid Panel with Direct LDL reflex           Subjective      PATIENT RETURNS FOR ROUTINE EVALUATION OF PATIENT'S MEDICAL ISSUES    INDIVIDUAL MEDICAL ISSUES WITH THEIR CURRENT STATUS, ASSESSMENT AND PLANS ARE LISTED ABOVE        Review of Systems   Constitutional: Negative for chills, fatigue and fever  HENT: Negative for congestion, ear discharge, ear pain, mouth sores, postnasal drip, sore throat and trouble swallowing  Eyes: Negative for pain, discharge and visual disturbance  Respiratory: Negative for cough, shortness of breath and wheezing  Cardiovascular: Negative for chest pain, palpitations and leg swelling  Gastrointestinal: Negative for abdominal distention, abdominal pain, blood in stool, diarrhea and nausea  Endocrine: Negative for polydipsia, polyphagia and polyuria  Genitourinary: Negative for dysuria, frequency, hematuria and urgency  Musculoskeletal: Negative for arthralgias, gait problem and joint swelling  Skin: Negative for pallor and rash  Neurological: Negative for dizziness, syncope, speech difficulty, weakness, light-headedness, numbness and headaches  Hematological: Negative for adenopathy  Psychiatric/Behavioral: Negative for behavioral problems, confusion and sleep disturbance  The patient is not nervous/anxious          Current Outpatient Medications on File Prior to Visit   Medication Sig   • albuterol (2 5 mg/3 mL) 0 083 % nebulizer solution INHALE THE CONTENTS OF 1 VIAL VIA NEBULIZER EVERY 6 HOURS AS NEEDED FOR WHEEZING OR SHORTNESS OF BREATH   • albuterol (PROVENTIL HFA,VENTOLIN HFA) 90 mcg/act inhaler Inhale 2 puffs every 4 (four) hours as needed for wheezing   • amLODIPine (NORVASC) 10 mg tablet TAKE 1 TABLET BY MOUTH EVERY DAY   • Cholecalciferol (VITAMIN D-3) 5000 units TABS Take 1 tablet by mouth daily   • Continuous Blood Gluc Sensor (FreeStyle Calderon 2 Sensor) MISC    • cyclobenzaprine (FLEXERIL) 10 mg tablet Take 1 tablet (10 mg total) by mouth 3 (three) times a day as needed for muscle spasms BRAND ONLY PLEASE   • Farxiga 10 MG TABS TAKE 1 TABLET BY MOUTH EVERY DAY   • glipiZIDE (GLUCOTROL XL) 10 mg 24 hr tablet TAKE 2 TABLETS (20MG TOTAL)DAILY   • glucose blood test strip Contour Meter   • MICROLET LANCETS [facility] [place]et   • Mometasone Furoate (Asmanex HFA) 100 MCG/ACT AERO Inhale 1 puff (100 mcg total) 2 (two) times a day (Patient taking differently: Inhale 1 puff if needed)   • Mounjaro 10 MG/0 5ML SOPN    • pantoprazole (PROTONIX) 40 mg tablet Take 1 tablet by mouth daily   • [DISCONTINUED] atorvastatin (LIPITOR) 10 mg tablet atorvastatin 10 mg tablet (Patient not taking: Reported on 11/21/2022)   • [DISCONTINUED] Ozempic, 1 MG/DOSE, 4 MG/3ML SOPN injection pen Inject 1 mg under the skin once a week   (Patient not taking: Reported on 11/21/2022)       Objective     /84 (BP Location: Right arm, Patient Position: Sitting, Cuff Size: Large)   Pulse 84   Temp 98 1 °F (36 7 °C) (Temporal)   Resp 12   Ht 5' 6 5" (1 689 m)   Wt 100 kg (221 lb)   SpO2 96%   BMI 35 14 kg/m²     Physical Exam  Constitutional:       Appearance: Normal appearance  He is well-developed and well-nourished  He is obese  HENT:      Head: Normocephalic and atraumatic  Eyes:      General:         Right eye: No discharge  Left eye: No discharge  Extraocular Movements: EOM normal       Conjunctiva/sclera: Conjunctivae normal       Pupils: Pupils are equal, round, and reactive to light  Neck:      Thyroid: No thyromegaly  Vascular: No JVD  Cardiovascular:      Rate and Rhythm: Normal rate and regular rhythm  Heart sounds: Normal heart sounds  No murmur heard  Pulmonary:      Effort: Pulmonary effort is normal       Breath sounds: Normal breath sounds  No wheezing or rales  Abdominal:      General: Bowel sounds are normal       Palpations: Abdomen is soft  There is no hepatosplenomegaly or mass  Tenderness: There is no abdominal tenderness  There is no CVA tenderness, guarding or rebound  Comments: OBESE     Musculoskeletal:         General: No tenderness, deformity or edema  Normal range of motion  Cervical back: Neck supple  Lymphadenopathy:      Cervical: No cervical adenopathy        Upper Body:   No axillary adenopathy present  Skin:     General: Skin is warm and dry  Findings: No erythema or rash  Neurological:      General: No focal deficit present  Mental Status: He is alert and oriented to person, place, and time  Psychiatric:         Mood and Affect: Mood and affect and mood normal          Behavior: Behavior normal          Thought Content:  Thought content normal          Judgment: Judgment normal        Veronica Vega MD

## 2022-11-21 NOTE — PATIENT INSTRUCTIONS
CONTINUE CURRENT TREATMENT PLAN  MONITOR DIETARY SODIUM, CHOL, AND CARBOHYDRATE INTAKE  ENCOURAGE PHYSICAL ACTIVITY  FOOT CARE    RV 3 M, SOONER PRN    Type 2 Diabetes Management for Adults   AMBULATORY CARE:   Type 2 diabetes  is a disease that affects how your body uses glucose (sugar)  Either your body cannot make enough insulin, or it cannot use the insulin correctly  It is important to keep diabetes controlled to prevent damage to your heart, blood vessels, and other organs  Have someone call your local emergency number (911 in the [facility]) if:   · You cannot be woken  · You have signs of diabetic ketoacidosis:     ? confusion, fatigue    ? vomiting    ? rapid heartbeat    ? fruity smelling breath    ? extreme thirst    ? dry mouth and skin    · You have any of the following signs of a heart attack:      ? Squeezing, pressure, or pain in your chest    ? You may  also have any of the following:     § Discomfort or pain in your back, neck, jaw, stomach, or arm    § Shortness of breath    § Nausea or vomiting    § Lightheadedness or a sudden cold sweat    · You have any of the following signs of a stroke:      ? Numbness or drooping on one side of your face     ? Weakness in an arm or leg    ? Confusion or difficulty speaking    ? Dizziness, a severe headache, or vision loss    Call your doctor or diabetes care team if:   · You have a sore or wound that will not heal     · You have a change in the amount you urinate  · Your blood sugar levels are higher than your target goals  · You often have lower blood sugar levels than your target goals  · Your skin is red, dry, warm, or swollen  · You have trouble coping with diabetes, or you feel anxious or depressed  · You have questions or concerns about your condition or care  What you need to know about high blood sugar levels:  High blood sugar levels may not cause any symptoms   You may feel more thirsty or urinate more often than usual  Over time, high blood sugar levels can damage your nerves, blood vessels, tissues, and organs  The following can increase your blood sugar levels:  · Large meals or large amounts of carbohydrates at one time    · Less physical activity    · Stress    · Illness    · A lower dose of medicine or insulin, or a late dose    What you need to know about low blood sugar levels: You can prevent symptoms such as shakiness, dizziness, irritability, or confusion by preventing your blood sugar levels from going too low  · Treat a low blood sugar level right away  ? Drink 4 ounces of juice or have 1 tube of glucose gel  ? Check your blood sugar level again 10 to 15 minutes later  ? When the level goes back to normal, eat a meal or snack to prevent another decrease  · Keep glucose gel, raisins, or hard candy with you at all times to treat a low blood sugar level  · Your blood sugar level can get too low if you take diabetes medicine or insulin and do not eat enough food  · If you use insulin, check your blood sugar level before you exercise  ? If your blood sugar level is below 100 mg/dL, eat 4 crackers or 2 ounces of raisins, or drink 4 ounces of juice  ? Check your level every 30 minutes if you exercise more than 1 hour  ? You may need a snack during or after exercise  What you can do to manage your blood sugar levels:   · Check your blood sugar levels as directed and as needed  Several items are available to use to check your levels  You may need to check by testing a drop of blood in a glucose monitor  You may instead be given a continuous glucose monitoring (CGM) device  The device is worn at all times  The CGM checks your blood sugar level every 5 minutes  It sends results to an electronic device such as a smart phone  A CGM can be used with or without an insulin pump  Talk with your provider to find out which method is best for you   The goal for blood sugar levels before meals  is between 80 and 130 mg/dL and 2 hours after eating  is lower than 180 mg/dL  · Make healthy food choices  Work with a dietitian to develop a meal plan that works for you and your schedule  A dietitian can help you learn how to eat the right amount of carbohydrates during your meals and snacks  Carbohydrates can raise your blood sugar level if you eat too many at one time  Examples of foods that contain carbohydrates are breads, cereals, rice, pasta, and sweets  · Get regular physical activity  Physical activity can help you get to your target blood sugar level goal and manage your weight  Get at least 150 minutes of moderate to vigorous aerobic physical activity each week  Do not miss more than 2 days in a row  Do not sit longer than 30 minutes at a time  Your healthcare provider can help you create an activity plan  The plan can include the best activities for you and can help you build your strength and endurance  · Maintain a healthy weight  Ask your healthcare provider what a healthy weight is for you  Ask him or her to help you create a safe weight loss plan if you are overweight  · Take your diabetes medicine or insulin as directed  You may need diabetes medicine, insulin, or both to help control your blood sugar levels  Your healthcare provider will teach you how and when to take your diabetes medicine or insulin  You will also be taught about side effects oral diabetes medicine can cause  Insulin may be injected, or given through a pump or pen  You and your care team will discuss which method is best for you  ? An insulin pump  is an implanted device that gives your insulin 24 hours a day  An insulin pump prevents the need for multiple insulin injections in a day  ? An insulin pen  is a device prefilled with the right amount of insulin  ? You and your family members will be taught how to draw up and give insulin  if this is the best method for you   Your education team will also teach you how to dispose of needles and syringes  ? You will learn how much insulin you need  and when to give it  You will be taught when not to give insulin  You will also be taught what to do if your blood sugar level drops too low  This may happen if you take insulin and do not eat the right amount of carbohydrates  Other things you can do to manage type 2 diabetes:   · Wear medical alert identification  Wear medical alert jewelry or carry a card that says you have diabetes  Ask your provider where to get these items  · Do not smoke  Nicotine and other chemicals in cigarettes and cigars can cause lung and blood vessel damage  It also makes it more difficult to manage your diabetes  Ask your provider for information if you currently smoke and need help to quit  Do not use e-cigarettes or smokeless tobacco in place of cigarettes or to help you quit  They still contain nicotine  · Check your feet each day for cuts, scratches, calluses, or other wounds  Look for redness and swelling, and feel for warmth  Wear shoes that fit well  Check your shoes for rocks or other objects that can hurt your feet  Do not walk barefoot or wear shoes without socks  Wear cotton socks to help keep your feet dry  · Ask about vaccines you may need  You have a higher risk for serious illness if you get the flu, pneumonia, COVID-19, or hepatitis  Ask your provider if you should get vaccines to prevent these or other diseases, and when to get the vaccines  · Talk to your care team if you become stressed about diabetes care  Sometimes being able to fit diabetes care into your life can cause increased stress  The stress can cause you not to take care of yourself properly  Your care team can help by offering tips about self-care  Your care team may suggest you talk to a mental health provider  The provider can listen and offer help with self-care issues      Follow up with your doctor or diabetes care team as directed: You may need to have blood tests done before your follow-up visit  The test results will show if changes need to be made in your treatment or self-care  Write down your questions so you remember to ask them during your visits  Talk to your provider if you cannot afford your medicine  © Copyright [employer] 2022 Information is for End User's use only and may not be sold, redistributed or otherwise used for commercial purposes  All illustrations and images included in CareNotes® are the copyrighted property of [employer]  or [facility] Abilio Saleh   The above information is an  only  It is not intended as medical advice for individual conditions or treatments  Talk to your doctor, nurse or pharmacist before following any medical regimen to see if it is safe and effective for you

## 2022-11-21 NOTE — LETTER
SLY BULLUCK      Current Outpatient Medications:   •  albuterol (2 5 mg/3 mL) 0 083 % nebulizer solution, INHALE THE CONTENTS OF 1 VIAL VIA NEBULIZER EVERY 6 HOURS AS NEEDED FOR WHEEZING OR SHORTNESS OF BREATH, Disp: 180 mL, Rfl: 0  •  albuterol (PROVENTIL HFA,VENTOLIN HFA) 90 mcg/act inhaler, Inhale 2 puffs every 4 (four) hours as needed for wheezing, Disp: 6 7 g, Rfl: 3  •  amLODIPine (NORVASC) 10 mg tablet, TAKE 1 TABLET BY MOUTH EVERY DAY, Disp: 90 tablet, Rfl: 3  •  Cholecalciferol (VITAMIN D-3) 5000 units TABS, Take 1 tablet by mouth daily, Disp: , Rfl:   •  Continuous Blood Gluc Sensor (FreeStyle Calderon 2 Sensor) MISC, , Disp: , Rfl:   •  cyclobenzaprine (FLEXERIL) 10 mg tablet, Take 1 tablet (10 mg total) by mouth 3 (three) times a day as needed for muscle spasms BRAND ONLY PLEASE, Disp: 45 tablet, Rfl: 1  •  Farxiga 10 MG TABS, TAKE 1 TABLET BY MOUTH EVERY DAY, Disp: 90 tablet, Rfl: 3  •  glipiZIDE (GLUCOTROL XL) 10 mg 24 hr tablet, TAKE 2 TABLETS (20MG TOTAL)DAILY, Disp: 180 tablet, Rfl: 1  •  glucose blood test strip, Contour Meter, Disp: , Rfl:   •  MICROLET LANCETS MISC, Microlet Lancet, Disp: , Rfl:   •  Mometasone Furoate (Asmanex HFA) 100 MCG/ACT AERO, Inhale 1 puff (100 mcg total) 2 (two) times a day (Patient taking differently: Inhale 1 puff if needed), Disp: 13 g, Rfl: 3  •  Mounjaro 10 MG/0 5ML SOPN, , Disp: , Rfl:   •  pantoprazole (PROTONIX) 40 mg tablet, Take 1 tablet by mouth daily, Disp: , Rfl:       Recent Results (from the past 672 hour(s))   CBC and differential    Collection Time: 11/18/22  1:56 PM   Result Value Ref Range    White Blood Cell Count 4 5 3 8 - 10 8 Thousand/uL    Red Blood Cell Count 6 66 (H) 4 20 - 5 80 Million/uL    Hemoglobin 17 7 (H) 13 2 - 17 1 g/dL    HCT 54 4 (H) 38 5 - 50 0 %    MCV 81 7 80 0 - 100 0 fL    MCH 26 6 (L) 27 0 - 33 0 pg    MCHC 32 5 32 0 - 36 0 g/dL    RDW 13 8 11 0 - 15 0 %    Platelet Count 982 644 - 400 Thousand/uL    SL AMB MPV 9 7 7 5 - 12 5 fL Neutrophils (Absolute) 2,363 1,500 - 7,800 cells/uL    Lymphocytes (Absolute) 1,625 850 - 3,900 cells/uL    Monocytes (Absolute) 360 200 - 950 cells/uL    Eosinophils (Absolute) 122 15 - 500 cells/uL    Basophils ABS 32 0 - 200 cells/uL    Neutrophils 52 5 %    Lymphocytes 36 1 %    Monocytes 8 0 %    Eosinophils 2 7 %    Basophils PCT 0 7 %   Comprehensive metabolic panel    Collection Time: 11/18/22  1:56 PM   Result Value Ref Range    Glucose, Random 349 (H) 65 - 99 mg/dL    BUN 13 7 - 25 mg/dL    Creatinine 0 95 0 70 - 1 30 mg/dL    eGFR 93 > OR = 60 mL/min/1 73m2    SL AMB BUN/CREATININE RATIO NOT APPLICABLE 6 - 22 (calc)    Sodium 136 135 - 146 mmol/L    Potassium 4 3 3 5 - 5 3 mmol/L    Chloride 99 98 - 110 mmol/L    CO2 29 20 - 32 mmol/L    Calcium 9 9 8 6 - 10 3 mg/dL    Protein, Total 7 3 6 1 - 8 1 g/dL    Albumin 4 3 3 6 - 5 1 g/dL    Globulin 3 0 1 9 - 3 7 g/dL (calc)    Albumin/Globulin Ratio 1 4 1 0 - 2 5 (calc)    TOTAL BILIRUBIN 0 5 0 2 - 1 2 mg/dL    Alkaline Phosphatase 123 35 - 144 U/L    AST 18 10 - 35 U/L    ALT 26 9 - 46 U/L   Lipid panel    Collection Time: 11/18/22  1:56 PM   Result Value Ref Range    Total Cholesterol 225 (H) <200 mg/dL    HDL 46 > OR = 40 mg/dL    Triglycerides 482 (H) <150 mg/dL    LDL Calculated  mg/dL (calc)    Chol HDLC Ratio 4 9 <5 0 (calc)    Non-HDL Cholesterol 179 (H) <130 mg/dL (calc)

## 2022-12-02 ENCOUNTER — TELEPHONE (OUTPATIENT)
Dept: FAMILY MEDICINE CLINIC | Facility: CLINIC | Age: 57
End: 2022-12-02

## 2022-12-02 NOTE — TELEPHONE ENCOUNTER
Otto Javier had Glipizide filled on 11/17/22, 10 mg regular tablets with Dr Dru Boyle as the prescriber  Now Otto Javier is requesting 10 mg 24 tablets with you as the prescriber  If just wanted to verify that you told me that he should not be taking both  So I should tell [facility] not to fill your prescription  If that is the case, I will contact Otto Javier to let him know      REF #[id number]

## 2022-12-02 NOTE — TELEPHONE ENCOUNTER
When I called to tell them to fill it, they need to know if he is taking both        REF # [id number]

## 2022-12-05 DIAGNOSIS — I10 ESSENTIAL HYPERTENSION: ICD-10-CM

## 2022-12-05 RX ORDER — AMLODIPINE BESYLATE 10 MG/1
TABLET ORAL
Qty: 90 TABLET | Refills: 3 | Status: SHIPPED | OUTPATIENT
Start: 2022-12-05

## 2022-12-05 NOTE — TELEPHONE ENCOUNTER
Spoke to Holly  Stated that he did not request for Dr Layla Whitley to fill his Glipizide  There was another rx that he was going to take over, but must have misunderstood  He will inform Dr Mikel Lynch office that they are not to refill this rx if it is send to them  His Glipizide will be filled by Dr Gene Ayala      He has 90 days worth now,  he will call US when he needs a refill

## 2022-12-15 ENCOUNTER — OFFICE VISIT (OUTPATIENT)
Dept: FAMILY MEDICINE CLINIC | Facility: CLINIC | Age: 57
End: 2022-12-15

## 2022-12-15 ENCOUNTER — TELEPHONE (OUTPATIENT)
Dept: FAMILY MEDICINE CLINIC | Facility: CLINIC | Age: 57
End: 2022-12-15

## 2022-12-15 VITALS
RESPIRATION RATE: 12 BRPM | SYSTOLIC BLOOD PRESSURE: 140 MMHG | HEIGHT: 67 IN | BODY MASS INDEX: 35 KG/M2 | DIASTOLIC BLOOD PRESSURE: 82 MMHG | WEIGHT: 223 LBS | HEART RATE: 98 BPM | TEMPERATURE: 98.4 F | OXYGEN SATURATION: 98 %

## 2022-12-15 DIAGNOSIS — M54.31 SCIATICA OF RIGHT SIDE: Primary | ICD-10-CM

## 2022-12-15 DIAGNOSIS — Z23 NEED FOR INFLUENZA VACCINATION: ICD-10-CM

## 2022-12-15 RX ORDER — DEXAMETHASONE SODIUM PHOSPHATE 4 MG/ML
4 INJECTION, SOLUTION INTRA-ARTICULAR; INTRALESIONAL; INTRAMUSCULAR; INTRAVENOUS; SOFT TISSUE ONCE
Status: COMPLETED | OUTPATIENT
Start: 2022-12-15 | End: 2022-12-15

## 2022-12-15 RX ORDER — PREDNISONE 20 MG/1
TABLET ORAL
Qty: 14 TABLET | Refills: 0 | Status: SHIPPED | OUTPATIENT
Start: 2022-12-15

## 2022-12-15 RX ORDER — TIRZEPATIDE 12.5 MG/.5ML
INJECTION, SOLUTION SUBCUTANEOUS
COMMUNITY
Start: 2022-12-12

## 2022-12-15 RX ORDER — KETOROLAC TROMETHAMINE 30 MG/ML
60 INJECTION, SOLUTION INTRAMUSCULAR; INTRAVENOUS ONCE
Status: COMPLETED | OUTPATIENT
Start: 2022-12-15 | End: 2022-12-15

## 2022-12-15 RX ADMIN — KETOROLAC TROMETHAMINE 60 MG: 30 INJECTION, SOLUTION INTRAMUSCULAR; INTRAVENOUS at 16:12

## 2022-12-15 RX ADMIN — DEXAMETHASONE SODIUM PHOSPHATE 4 MG: 4 INJECTION, SOLUTION INTRA-ARTICULAR; INTRALESIONAL; INTRAMUSCULAR; INTRAVENOUS; SOFT TISSUE at 16:11

## 2022-12-15 NOTE — PATIENT INSTRUCTIONS
REST  AVOID LIFTING AND PULLING    MEDICATION AS DIRECTED    MAY NEED TO CONSIDER PT AND IMAGING    CALL TOMORROW WITH UPDATE

## 2022-12-15 NOTE — PROGRESS NOTES
Name: Adolfo Jeronimo      : 1965      MRN: [id number]  Encounter Provider: Edwardo Staley MD  Encounter Date: 12/15/2022   Encounter department: [address] [place] [address]     1  Sciatica of right side  -     ketorolac (TORADOL) 60 mg/2 mL IM injection 60 mg  -     dexamethasone (DECADRON) injection 4 mg  -     predniSONE 20 mg tablet; 2 PO QD X 4 DAYS, THEN 1 PO QD X 4 DAYS, THEN 1/2 PO QD X 4 DAYS    2  Need for influenza vaccination  -     influenza vaccine, quadrivalent, recombinant, PF, 0 5 mL, for patients 18 yr+ (FLUBLOK)         Subjective      Hip Pain   The incident occurred more than 1 week ago  The incident occurred at home  The injury mechanism was a twisting injury  The pain is present in the right thigh and right hip  The quality of the pain is described as aching  The pain is moderate  The pain has been fluctuating since onset  Pertinent negatives include no inability to bear weight, loss of motion, loss of sensation, muscle weakness, numbness or tingling  He reports no foreign bodies present  The symptoms are aggravated by movement  He has tried acetaminophen for the symptoms  The treatment provided mild relief  Review of Systems   Constitutional: Negative for chills, fatigue and fever  HENT: Negative for sore throat  Eyes: Negative for discharge  Respiratory: Negative for cough and chest tightness  Cardiovascular: Negative for chest pain and palpitations  Gastrointestinal: Negative for abdominal pain, diarrhea, nausea and vomiting  Musculoskeletal: Negative for arthralgias and gait problem  Neurological: Negative for dizziness, tingling, weakness, numbness and headaches  Hematological: Negative for adenopathy  Psychiatric/Behavioral: The patient is not nervous/anxious          Current Outpatient Medications on File Prior to Visit   Medication Sig   • albuterol (2 5 mg/3 mL) 0 083 % nebulizer solution INHALE THE CONTENTS OF 1 VIAL VIA NEBULIZER EVERY 6 HOURS AS NEEDED FOR WHEEZING OR SHORTNESS OF BREATH   • albuterol (PROVENTIL HFA,VENTOLIN HFA) 90 mcg/act inhaler Inhale 2 puffs every 4 (four) hours as needed for wheezing   • amLODIPine (NORVASC) 10 mg tablet TAKE 1 TABLET BY MOUTH EVERY DAY   • Cholecalciferol (VITAMIN D-3) 5000 units TABS Take 1 tablet by mouth daily   • Continuous Blood Gluc Sensor (FreeStyle Calderon 2 Sensor) MISC    • cyclobenzaprine (FLEXERIL) 10 mg tablet Take 1 tablet (10 mg total) by mouth 3 (three) times a day as needed for muscle spasms BRAND ONLY PLEASE   • Farxiga 10 MG TABS TAKE 1 TABLET BY MOUTH EVERY DAY   • glipiZIDE (GLUCOTROL XL) 10 mg 24 hr tablet TAKE 2 TABLETS (20MG TOTAL)DAILY   • MICROLET LANCETS [facility] Microlet Lancet   • Mometasone Furoate (Asmanex HFA) 100 MCG/ACT AERO Inhale 1 puff (100 mcg total) 2 (two) times a day (Patient taking differently: Inhale 1 puff if needed)   • Mounjaro 12 5 MG/0 5ML SOPN    • pantoprazole (PROTONIX) 40 mg tablet Take 1 tablet by mouth daily   • rosuvastatin (CRESTOR) 5 mg tablet Take 1 tablet (5 mg total) by mouth daily   • glucose blood test strip Contour Meter (Patient not taking: Reported on 12/15/2022)   • [DISCONTINUED] Mounjaro 10 MG/0 5ML SOPN  (Patient not taking: Reported on 12/15/2022)       Objective     /82   Pulse 98   Temp 98 4 °F (36 9 °C) (Temporal)   Resp 12   Ht 5' 6 5" (1 689 m)   Wt 101 kg (223 lb)   SpO2 98%   BMI 35 45 kg/m²     Physical Exam  Constitutional:       Appearance: He is well-developed  HENT:      Head: Normocephalic and atraumatic  Eyes:      General:         Right eye: No discharge  Left eye: No discharge  Conjunctiva/sclera: Conjunctivae normal       Pupils: Pupils are equal, round, and reactive to light  Neck:      Thyroid: No thyromegaly  Vascular: No JVD  Cardiovascular:      Rate and Rhythm: Normal rate and regular rhythm  Heart sounds: Normal heart sounds   No murmur heard   Pulmonary:      Effort: Pulmonary effort is normal       Breath sounds: Normal breath sounds  No wheezing or rales  Abdominal:      General: Bowel sounds are normal       Palpations: Abdomen is soft  There is no mass  Tenderness: There is no abdominal tenderness  There is no guarding or rebound  Musculoskeletal:         General: No tenderness or deformity  Normal range of motion  Cervical back: Neck supple  Comments: MILD SCIATIC [address] TENDERNESS  SLR POSITIVE  REFLEXES WNL   Lymphadenopathy:      Cervical: No cervical adenopathy  Skin:     General: Skin is warm and dry  Findings: No erythema or rash  Neurological:      Mental Status: He is alert and oriented to person, place, and time  Psychiatric:         Behavior: Behavior normal          Thought Content:  Thought content normal          Judgment: Judgment normal           MINIMAL LUMBAR DISCOMFORT  SL SCIATIC NOTCH TENDERNESS ON R  POS SLR ON R    Corwin Razo MD

## 2022-12-15 NOTE — TELEPHONE ENCOUNTER
Verbally spoke to Dr Jose Flanagan who asked if Zarivitajason Estrada wanted to come in today at 3:45  appt scheduled    No further action

## 2022-12-15 NOTE — TELEPHONE ENCOUNTER
Senia Maradiaga saw you about 2 weeks ago for pain in right hip  States that the pain is so bad  Can't sleep, can't sit  Lower back meets hip  When he lift his leg up the pain goes all the way down his leg      Please Advise

## 2023-01-24 ENCOUNTER — OFFICE VISIT (OUTPATIENT)
Dept: FAMILY MEDICINE CLINIC | Facility: CLINIC | Age: 58
End: 2023-01-24

## 2023-01-24 VITALS
DIASTOLIC BLOOD PRESSURE: 90 MMHG | HEART RATE: 84 BPM | SYSTOLIC BLOOD PRESSURE: 132 MMHG | WEIGHT: 217 LBS | BODY MASS INDEX: 34.06 KG/M2 | RESPIRATION RATE: 16 BRPM | OXYGEN SATURATION: 96 % | HEIGHT: 67 IN | TEMPERATURE: 98.7 F

## 2023-01-24 DIAGNOSIS — M54.16 LUMBAR RADICULOPATHY: Primary | ICD-10-CM

## 2023-01-24 DIAGNOSIS — M54.31 SCIATICA OF RIGHT SIDE: ICD-10-CM

## 2023-01-24 DIAGNOSIS — E11.9 TYPE 2 DIABETES MELLITUS WITHOUT COMPLICATION, WITHOUT LONG-TERM CURRENT USE OF INSULIN (HCC): ICD-10-CM

## 2023-01-24 RX ORDER — PREDNISONE 20 MG/1
TABLET ORAL
Qty: 14 TABLET | Refills: 0 | Status: SHIPPED | OUTPATIENT
Start: 2023-01-24

## 2023-01-24 RX ORDER — DEXAMETHASONE SODIUM PHOSPHATE 4 MG/ML
4 INJECTION, SOLUTION INTRA-ARTICULAR; INTRALESIONAL; INTRAMUSCULAR; INTRAVENOUS; SOFT TISSUE ONCE
Status: COMPLETED | OUTPATIENT
Start: 2023-01-24 | End: 2023-01-24

## 2023-01-24 RX ORDER — TIRZEPATIDE 15 MG/.5ML
INJECTION, SOLUTION SUBCUTANEOUS
COMMUNITY
Start: 2023-01-06

## 2023-01-24 RX ORDER — KETOROLAC TROMETHAMINE 30 MG/ML
60 INJECTION, SOLUTION INTRAMUSCULAR; INTRAVENOUS ONCE
Status: COMPLETED | OUTPATIENT
Start: 2023-01-24 | End: 2023-01-24

## 2023-01-24 RX ADMIN — KETOROLAC TROMETHAMINE 60 MG: 30 INJECTION, SOLUTION INTRAMUSCULAR; INTRAVENOUS at 14:23

## 2023-01-24 RX ADMIN — DEXAMETHASONE SODIUM PHOSPHATE 4 MG: 4 INJECTION, SOLUTION INTRA-ARTICULAR; INTRALESIONAL; INTRAMUSCULAR; INTRAVENOUS; SOFT TISSUE at 14:21

## 2023-01-24 NOTE — PATIENT INSTRUCTIONS
REST  AVOID LIFTING OR PUSHING  MEDICATION AS DIRECTED    PT  X RAY    FURTHER PLANS PENDING EVALUATION

## 2023-01-24 NOTE — PROGRESS NOTES
Name: Ricci Alvarado      : 1965      MRN: [id number]  Encounter Provider: Delmer Marquez MD  Encounter Date: 2023   Encounter department: [facility]     1  Lumbar radiculopathy  -     ketorolac (TORADOL) 60 mg/2 mL IM injection 60 mg  -     dexamethasone (DECADRON) injection 4 mg  -     predniSONE 20 mg tablet; 2 PO QD X 4 DAYS, THEN 1 PO QD X 4 DAYS, THEN 1/2 PO QD X 4 DAYS  -     Ambulatory Referral to Physical Therapy; Future  -     XR spine lumbar minimum 4 views non injury; Future; Expected date: 2023  -     XR hip/pelv 2-3 vws right if performed; Future; Expected date: 2023    2  Sciatica of right side  -     ketorolac (TORADOL) 60 mg/2 mL IM injection 60 mg  -     dexamethasone (DECADRON) injection 4 mg  -     predniSONE 20 mg tablet; 2 PO QD X 4 DAYS, THEN 1 PO QD X 4 DAYS, THEN 1/2 PO QD X 4 DAYS  -     Ambulatory Referral to Physical Therapy; Future  -     XR spine lumbar minimum 4 views non injury; Future; Expected date: 2023  -     XR hip/pelv 2-3 vws right if performed; Future; Expected date: 2023    3  Type 2 diabetes mellitus without complication, without long-term current use of insulin (HCC)    BMI Counseling: Body mass index is 34 5 kg/m²  The BMI is above normal  Nutrition recommendations include encouraging healthy choices of fruits and vegetables and moderation in carbohydrate intake  Exercise recommendations include exercising 3-5 times per week  No pharmacotherapy was ordered  Rationale for BMI follow-up plan is due to patient being overweight or obese  Subjective      Back Pain  This is a recurrent problem  The current episode started 1 to 4 weeks ago  The problem occurs constantly  The problem has been gradually worsening since onset  The pain is present in the gluteal  The pain radiates to the right thigh  The pain is mild  The pain is worse during the night   The symptoms are aggravated by bending and lying down  Stiffness is present all day  Associated symptoms include leg pain  Pertinent negatives include no abdominal pain, bladder incontinence, bowel incontinence, chest pain, dysuria, fever, headaches, numbness, paresis, paresthesias, pelvic pain, perianal numbness, tingling, weakness or weight loss  Risk factors include obesity  He has tried NSAIDs for the symptoms  The treatment provided mild relief  Review of Systems   Constitutional: Negative for chills, fatigue, fever and weight loss  HENT: Negative for sore throat  Eyes: Negative for discharge  Respiratory: Negative for cough and chest tightness  Cardiovascular: Negative for chest pain and palpitations  Gastrointestinal: Negative for abdominal pain, bowel incontinence, diarrhea, nausea and vomiting  Genitourinary: Negative for bladder incontinence, dysuria and pelvic pain  Musculoskeletal: Positive for back pain  Negative for arthralgias and gait problem  Neurological: Negative for dizziness, tingling, weakness, numbness, headaches and paresthesias  Hematological: Negative for adenopathy  Psychiatric/Behavioral: The patient is not nervous/anxious          Current Outpatient Medications on File Prior to Visit   Medication Sig   • albuterol (2 5 mg/3 mL) 0 083 % nebulizer solution INHALE THE CONTENTS OF 1 VIAL VIA NEBULIZER EVERY 6 HOURS AS NEEDED FOR WHEEZING OR SHORTNESS OF BREATH   • albuterol (PROVENTIL HFA,VENTOLIN HFA) 90 mcg/act inhaler Inhale 2 puffs every 4 (four) hours as needed for wheezing   • amLODIPine (NORVASC) 10 mg tablet TAKE 1 TABLET BY MOUTH EVERY DAY   • Cholecalciferol (VITAMIN D-3) 5000 units TABS Take 1 tablet by mouth daily   • Continuous Blood Gluc Sensor MISC Use Freestyle Calderon 3 Sensor   • cyclobenzaprine (FLEXERIL) 10 mg tablet Take 1 tablet (10 mg total) by mouth 3 (three) times a day as needed for muscle spasms BRAND ONLY PLEASE   • Farxiga 10 MG TABS TAKE 1 TABLET BY MOUTH EVERY DAY • glipiZIDE (GLUCOTROL XL) 10 mg 24 hr tablet TAKE 2 TABLETS (20MG TOTAL)DAILY   • MICROLET LANCETS MISC Microlet Lancet   • Mometasone Furoate (Asmanex HFA) 100 MCG/ACT AERO Inhale 1 puff (100 mcg total) 2 (two) times a day (Patient taking differently: Inhale 1 puff if needed)   • Mounjaro 15 MG/0 5ML    • pantoprazole (PROTONIX) 40 mg tablet Take 1 tablet by mouth daily   • rosuvastatin (CRESTOR) 5 mg tablet Take 1 tablet (5 mg total) by mouth daily   • [DISCONTINUED] Continuous Blood Gluc Sensor (FreeStyle Calderon 2 Sensor) MISC  (Patient not taking: Reported on 1/24/2023)   • [DISCONTINUED] glucose blood test strip Contour Meter (Patient not taking: Reported on 12/15/2022)   • [DISCONTINUED] Mounjaro 12 5 MG/0 5ML SOPN  (Patient not taking: Reported on 1/24/2023)   • [DISCONTINUED] predniSONE 20 mg tablet 2 PO QD X 4 DAYS, THEN 1 PO QD X 4 DAYS, THEN 1/2 PO QD X 4 DAYS (Patient not taking: Reported on 1/24/2023)       Objective     /90 (BP Location: Right arm, Patient Position: Sitting, Cuff Size: Large)   Pulse 84   Temp 98 7 °F (37 1 °C) (Temporal)   Resp 16   Ht 5' 6 5" (1 689 m)   Wt 98 4 kg (217 lb)   SpO2 96%   BMI 34 50 kg/m²     Physical Exam  Constitutional:       Appearance: He is well-developed  HENT:      Head: Normocephalic and atraumatic  Eyes:      General:         Right eye: No discharge  Left eye: No discharge  Conjunctiva/sclera: Conjunctivae normal       Pupils: Pupils are equal, round, and reactive to light  Neck:      Thyroid: No thyromegaly  Vascular: No JVD  Cardiovascular:      Rate and Rhythm: Normal rate and regular rhythm  Heart sounds: Normal heart sounds  No murmur heard  Pulmonary:      Effort: Pulmonary effort is normal       Breath sounds: Normal breath sounds  No wheezing or rales  Abdominal:      General: Bowel sounds are normal       Palpations: Abdomen is soft  There is no mass  Tenderness: There is no abdominal tenderness  There is no guarding or rebound  Musculoskeletal:         General: Tenderness present  No deformity  Normal range of motion  Cervical back: Neck supple  Comments: MINIMAL LUMBAR SPINE TENDERNESS  R SCIATIC [address] TENDERNESS  SL POS SLR ON R  NO ALTERATION OF SENSATION     Lymphadenopathy:      Cervical: No cervical adenopathy  Skin:     General: Skin is warm and dry  Findings: No erythema or rash  Neurological:      Mental Status: He is alert and oriented to person, place, and time  Psychiatric:         Behavior: Behavior normal          Thought Content:  Thought content normal          Judgment: Judgment normal        Rebecca Mckinnon MD

## 2023-01-26 ENCOUNTER — APPOINTMENT (OUTPATIENT)
Dept: RADIOLOGY | Facility: CLINIC | Age: 58
End: 2023-01-26

## 2023-01-26 DIAGNOSIS — M54.31 SCIATICA OF RIGHT SIDE: ICD-10-CM

## 2023-01-26 DIAGNOSIS — M54.16 LUMBAR RADICULOPATHY: ICD-10-CM

## 2023-02-02 ENCOUNTER — EVALUATION (OUTPATIENT)
Dept: PHYSICAL THERAPY | Facility: CLINIC | Age: 58
End: 2023-02-02

## 2023-02-02 DIAGNOSIS — M54.31 SCIATICA OF RIGHT SIDE: ICD-10-CM

## 2023-02-02 DIAGNOSIS — M54.16 LUMBAR RADICULOPATHY: ICD-10-CM

## 2023-02-02 NOTE — PROGRESS NOTES
PT Evaluation     Today's date: 2023  Patient name: Carrie Hendrickson  : 1965  MRN: [id number]  Referring provider: Hazel Rivas MD  Dx:   Encounter Diagnosis     ICD-10-CM    1  Lumbar radiculopathy  M54 16 Ambulatory Referral to Physical Therapy      2  Sciatica of right side  M54 31 Ambulatory Referral to Physical Therapy                     Assessment  Assessment details: Carrie Hendrickson is a 60 y o  male who presents with right lateral hip pain and intermittent radicular symptoms distally to right foot  He reports his symptoms are at a minimal level today that he believes is a result of his recent steroid shot and oral steroid  During todays session his radicular symptoms were consistently provoked with neural tensioning of sciatic nerve on right side  He demos hyperonicity and low level TTP in right piriformis and gluteus medius  He also presents with decreased hip ROM, MMT, provocation of symptoms with sustained compression due to laying on side or in certain chairs  Due to these impairments, patient has difficulty performing ADL's, work-related activities, ambulation, prolonged sitting, disturbances to sleep, pain with sudden twisting movements and is hesitant to exercise  Patient's clinical presentation is consistent with right piriformis syndrome and poor sciatic nerve neurodynamics  Patient has been educated in postural education, home exercise program and plan of care  Patient would benefit from skilled physical therapy services to address their aforementioned functional limitations and progress towards prior level of function and independence with home exercise program    Impairments: abnormal or restricted ROM, activity intolerance, impaired physical strength, lacks appropriate home exercise program, pain with function, weight-bearing intolerance, poor posture  and poor body mechanics  Understanding of Dx/Px/POC: good   Prognosis: good    Goals  ST    Pt will be I with basic HEP within 2-4 weeks  2   Improve right hip strength by 1/2 grade in all deficient planes within 2-4 weeks  3   Improve right hip and strength so that pt is able to ambulate with pain free for 30 minutes within 2-4 weeks  LT  Improve right hip strength so that pt is able to ambulate without limitation and without pain within 4-8 weeks   2  Improve sciatic neurodynamics to be able to sleep pain free within 4-8 weeks   3  Improve right hip strength to be able to squat and lift >20lbs without discomfort within 4-8 weeks  Plan  Plan details: HEP development, stretching, strengthening, A/AA/PROM, joint mobilizations, posture education, STM/MI as needed to reduce muscle tension, balance and proprioceptive training, muscle reeducation, PLOC discussed and agreed upon with patient  Patient would benefit from: PT eval and skilled physical therapy  Planned modality interventions: cryotherapy and thermotherapy: hydrocollator packs  Planned therapy interventions: manual therapy, neuromuscular re-education, self care, therapeutic activities, therapeutic exercise and home exercise program  Frequency: 2x week  Duration in weeks: 6  Treatment plan discussed with: patient        Subjective Evaluation    History of Present Illness  Mechanism of injury: Carly Bynum reports about 3 months ago he was pulling a heavy generator and tweaked his low back and right hip  He went to see his PCP who has administered several injections in soft tissue of right hip and 2 rounds of prednisone  He states after the 1st injection he felt relief that he states lasted for over a month but pain slowly returned  He just received his second injection about 1-2 weeks ago and started 2nd round of prednisone  Pain Location: Intermittent radicular symptoms completely into right foot but more commonly in right lateral hip  Pain Type: Sharp, dull, tingling depending on moment    Worst: 10/10  Best: 0/10  Current: 0/10     Aggravating Factors: Sleeping on sides, Sitting on couch, walking, twisting  Reducing Factors:  Stretching right glut, sitting in more supportive chair    As per  FOTO pt reports:  "Limited a little" with:     - Vigorous activities like running, lifting heavy objects, participating in strenuous sports    - Participating in recreation    - Walking a mile    - Moderate activities, like moving a table, pushing a vacuum , bowling, or playing golf  - Lifting an object, like a bag of groceries from the floor    - Climbing several flights of stairs   "Little bit of difficulty" with:    - Performing your usual work, housework, or school activities   Social Support  Steps to enter house: yes  Stairs in house: yes   Lives in: multiple-level home  Lives with: spouse    Employment status: working (Clinical )    Diagnostic Tests  X-ray: normal        Objective     Postural Observations  Seated posture: poor  Standing posture: poor  Correction of posture: makes symptoms better        Palpation     Right   Hypertonic in the gluteus medius and piriformis  Tenderness of the gluteus medius and piriformis  Neurological Testing     Sensation     Lumbar   Left   Intact: light touch    Right   Intact: light touch    Active Range of Motion     Lumbar   Flexion:  WFL  Extension:  WFL  Left lateral flexion:  WFL  Right lateral flexion:  WFL    Tests     Lumbar     Left   Negative crossed SLR and passive SLR  Right   Positive passive SLR  Negative crossed SLR  Left Hip   Negative RICHAR, FADIR, piriformis and scour  Right Hip   Negative RICHAR, FADIR, piriformis and scour       Ambulation     Ambulation: Stairs   Ascend stairs: independent  Pattern: reciprocal  Railings: one rail  Descend stairs: independent  Pattern: reciprocal  Railings: one rail    Observational Gait   Gait: within functional limits          MMT IE IE      Left Right Left Right   Hip Flex WNL WNL     Hip Abd WNL WNL     Hip IR 39 degrees  30 degrees      Hip ER 18 degrees  31 degrees               ROM IE IE      Left Right Left Right   Hip Flex 5/5 5/5     Hip Abd 4+/5 4-/5     Hip IR 4/5 4-/5     Hip ER 4+/5 4/5                    Precautions:   Past Medical History:   Diagnosis Date   • Asthma    • Diabetes mellitus ([facility] [address] )    • Hypertension         Access Code: [email or web address]  URL: [email or web address]  Date: 02/02/2023  Prepared by: Sussy Guido    Exercises  Supine 90/90 Sciatic Nerve Glide with Knee Flexion/Extension - 1-2 x daily - 7 x weekly - 1 sets - 10 reps  Supine Piriformis Stretch with Foot on Ground - 2-3 x daily - 7 x weekly - 3 sets - 30 hold  Supine Gluteus Stretch - 2-3 x daily - 7 x weekly - 3 sets - 30 hold    IE Completed - 2/2/23  Manuals   2/2/23      IE                     Neuro Re-Ed                                                Ther Ex                                                      Ther Activity                  Gait Training                  Modalities

## 2023-02-07 ENCOUNTER — APPOINTMENT (OUTPATIENT)
Dept: PHYSICAL THERAPY | Facility: CLINIC | Age: 58
End: 2023-02-07

## 2023-02-09 ENCOUNTER — APPOINTMENT (OUTPATIENT)
Dept: PHYSICAL THERAPY | Facility: CLINIC | Age: 58
End: 2023-02-09

## 2023-02-14 ENCOUNTER — OFFICE VISIT (OUTPATIENT)
Dept: PHYSICAL THERAPY | Facility: CLINIC | Age: 58
End: 2023-02-14

## 2023-02-14 DIAGNOSIS — M54.31 SCIATICA OF RIGHT SIDE: ICD-10-CM

## 2023-02-14 DIAGNOSIS — M54.16 LUMBAR RADICULOPATHY: Primary | ICD-10-CM

## 2023-02-14 NOTE — PROGRESS NOTES
Daily Note     Today's date: 2023  Patient name: Dani Whitten  : 1965  MRN: [id number]  Referring provider: Morales Marsh MD  Dx:   Encounter Diagnosis     ICD-10-CM    1  Lumbar radiculopathy  M54 16       2  Sciatica of right side  M54 31                      Subjective: Dani Whitten reports he has experienced intermittent pain usually with prolonged standing or walking that is mainly in right glute/lateral hip region  He also believes sitting prolonged on his wallet is related to pain  He states when he switches the side his wallet is on it decreased pain immediately  Objective: See treatment diary below      Assessment: Tolerated treatment well  Patient demonstrated fatigue post treatment in glute med with low tolerance to SLS with cueing to improve lateral hip recruitment  He required initial cueing to improve recruitment of glute med during clamshells but quickly improved  He demos improved tolerance and neurodynamic mobility of sciatic nerve since last session  Plan: Continue per plan of care  Precautions:   Past Medical History:   Diagnosis Date   • Asthma    • Diabetes mellitus ([facility] [address] )    • Hypertension         Access Code: [email or web address]  URL: [email or web address]  Date: 2023  Prepared by: Lauretha Moritz        IE Completed - 23  Manuals  23     2 IE                     Neuro Re-Ed      clamshell  2x10 B     ML on airex  2x10 5s    Bridge w/ abd stab  2x10 5s    Seated sciatic nerve tensioner  10x B    Seated hip IR/ER w/ abd stab  2x10 5s B                Ther Ex      Hook piriformis str  3x30s B    Hook cross body glut str  3x30s B    HEP  updated

## 2023-02-16 ENCOUNTER — APPOINTMENT (OUTPATIENT)
Dept: PHYSICAL THERAPY | Facility: CLINIC | Age: 58
End: 2023-02-16

## 2023-02-22 ENCOUNTER — APPOINTMENT (OUTPATIENT)
Dept: PHYSICAL THERAPY | Facility: CLINIC | Age: 58
End: 2023-02-22

## 2023-02-28 ENCOUNTER — RA CDI HCC (OUTPATIENT)
Dept: OTHER | Facility: HOSPITAL | Age: 58
End: 2023-02-28

## 2023-02-28 NOTE — PROGRESS NOTES
[facility]  coding opportunities       Chart reviewed, no opportunity found: CHART REVIEWED, NO OPPORTUNITY FOUND        Patients Insurance        Commercial Insurance: [employer]

## 2023-03-01 LAB
ALBUMIN SERPL-MCNC: 4.3 G/DL (ref 3.6–5.1)
ALBUMIN/GLOB SERPL: 1.5 (CALC) (ref 1–2.5)
ALP SERPL-CCNC: 86 U/L (ref 35–144)
ALT SERPL-CCNC: 17 U/L (ref 9–46)
AST SERPL-CCNC: 16 U/L (ref 10–35)
BILIRUB SERPL-MCNC: 0.4 MG/DL (ref 0.2–1.2)
BUN SERPL-MCNC: 13 MG/DL (ref 7–25)
BUN/CREAT SERPL: ABNORMAL (CALC) (ref 6–22)
CALCIUM SERPL-MCNC: 9.9 MG/DL (ref 8.6–10.3)
CHLORIDE SERPL-SCNC: 105 MMOL/L (ref 98–110)
CHOLEST SERPL-MCNC: 176 MG/DL
CHOLEST/HDLC SERPL: 3.4 (CALC)
CO2 SERPL-SCNC: 24 MMOL/L (ref 20–32)
CREAT SERPL-MCNC: 0.9 MG/DL (ref 0.7–1.3)
GFR/BSA.PRED SERPLBLD CYS-BASED-ARV: 98 ML/MIN/1.73M2
GLOBULIN SER CALC-MCNC: 2.8 G/DL (CALC) (ref 1.9–3.7)
GLUCOSE SERPL-MCNC: 165 MG/DL (ref 65–99)
HBA1C MFR BLD: 9.6 % OF TOTAL HGB
HDLC SERPL-MCNC: 52 MG/DL
LDLC SERPL CALC-MCNC: 97 MG/DL (CALC)
NONHDLC SERPL-MCNC: 124 MG/DL (CALC)
POTASSIUM SERPL-SCNC: 4.1 MMOL/L (ref 3.5–5.3)
PROT SERPL-MCNC: 7.1 G/DL (ref 6.1–8.1)
SODIUM SERPL-SCNC: 138 MMOL/L (ref 135–146)
TRIGL SERPL-MCNC: 167 MG/DL

## 2023-03-06 ENCOUNTER — OFFICE VISIT (OUTPATIENT)
Dept: FAMILY MEDICINE CLINIC | Facility: CLINIC | Age: 58
End: 2023-03-06

## 2023-03-06 VITALS
OXYGEN SATURATION: 96 % | WEIGHT: 210 LBS | BODY MASS INDEX: 32.96 KG/M2 | HEIGHT: 67 IN | TEMPERATURE: 98.4 F | DIASTOLIC BLOOD PRESSURE: 72 MMHG | HEART RATE: 84 BPM | RESPIRATION RATE: 12 BRPM | SYSTOLIC BLOOD PRESSURE: 114 MMHG

## 2023-03-06 DIAGNOSIS — J45.20 MILD INTERMITTENT ASTHMA WITHOUT COMPLICATION: ICD-10-CM

## 2023-03-06 DIAGNOSIS — E78.2 MIXED HYPERLIPIDEMIA: ICD-10-CM

## 2023-03-06 DIAGNOSIS — I10 ESSENTIAL HYPERTENSION: ICD-10-CM

## 2023-03-06 DIAGNOSIS — M54.31 SCIATICA OF RIGHT SIDE: ICD-10-CM

## 2023-03-06 DIAGNOSIS — E11.9 TYPE 2 DIABETES MELLITUS WITHOUT COMPLICATION, WITHOUT LONG-TERM CURRENT USE OF INSULIN (HCC): Primary | ICD-10-CM

## 2023-03-06 DIAGNOSIS — M54.16 LUMBAR RADICULOPATHY: ICD-10-CM

## 2023-03-06 PROBLEM — M65.341 TRIGGER RING FINGER OF RIGHT HAND: Status: RESOLVED | Noted: 2017-09-27 | Resolved: 2023-03-06

## 2023-03-06 PROBLEM — Z23 NEED FOR INFLUENZA VACCINATION: Status: RESOLVED | Noted: 2021-09-30 | Resolved: 2023-03-06

## 2023-03-06 RX ORDER — GLIPIZIDE 10 MG/1
10 TABLET, FILM COATED, EXTENDED RELEASE ORAL DAILY
Qty: 180 TABLET | Refills: 1 | Status: SHIPPED | OUTPATIENT
Start: 2023-03-06

## 2023-03-06 NOTE — PROGRESS NOTES
Name: Carly Bynum      : 1965      MRN: [id number]  Encounter Provider: Keiko Reid MD  Encounter Date: 3/6/2023   Encounter department: [address] [place] [address]     1  Type 2 diabetes mellitus without complication, without long-term current use of insulin ([facility])  Assessment & Plan:    Lab Results   Component Value Date    HGBA1C 9 6 (H) 2023       COMPLIANT WITH MEDICATION  DENIES ANY NUMBNESS, TINGLING IN FEET    - DISCUSSED DIETARY MODIFICATION OF CARBOHYDRATES  - HGB A1C AND URINE STUDIES DUE TODAY  - FOOT CARE  - RV 3 MONTHS        Orders:  -     Basic metabolic panel; Future; Expected date: 2023  -     Microalbumin, Random Urine (W/Creatinine) ([facility] ONLY); Future; Expected date: 2023  -     Hemoglobin A1c (w/out EAG) ([facility] ONLY); Future; Expected date: 2023  -     Basic metabolic panel  -     glipiZIDE (GLUCOTROL XL) 10 mg 24 hr tablet; Take 1 tablet (10 mg total) by mouth daily    2  Essential hypertension  Assessment & Plan:  STABLE  DENIES ANY CP, SOB, PALPITATIONS, OR HEADACHE  NOTES NO WATER RETENTION  COMPLIANT WITH MEDICATION  NO CONCERNS    - CONTINUE CURRENT TREATMENT PLAN  - MONITOR DIETARY SODIUM INTAKE  - ENCOURAGE PHYSICAL ACTIVITY  - RV 3 MONTHS        3  Mild intermittent asthma without complication  Assessment & Plan:  STABLE      4  Mixed hyperlipidemia  Assessment & Plan:  WATCHING CHOLESTEROL INTAKE  COMPLIANT WITH MEDICATION  NO CONCERNS    - CONTINUE TO MONITOR DIETARY CHOL INTAKE  - CONTINUE CURRENT MEDICATION  - ENCOURAGED PHYSICAL ACTIVITY          5  Lumbar radiculopathy  -     MRI lumbar spine wo contrast; Future; Expected date: 2023    6  Sciatica of right side  -     MRI lumbar spine wo contrast; Future; Expected date: 2023        Depression Screening and Follow-up Plan: Patient was screened for depression during today's encounter   They screened negative with a PHQ-2 score of 0         Subjective      PATIENT RETURNS FOR ROUTINE EVALUATION OF PATIENT'S MEDICAL ISSUES    INDIVIDUAL MEDICAL ISSUES WITH THEIR CURRENT STATUS, ASSESSMENT AND PLANS ARE LISTED ABOVE        Review of Systems   Constitutional: Negative for chills and fever  HENT: Negative for congestion, ear discharge, ear pain, mouth sores, postnasal drip, sore throat and trouble swallowing  Eyes: Negative for pain, discharge and visual disturbance  Respiratory: Negative for cough, shortness of breath and wheezing  Cardiovascular: Negative for palpitations and leg swelling  Gastrointestinal: Negative for abdominal distention, abdominal pain, blood in stool, diarrhea and nausea  Genitourinary: Negative for dysuria, frequency, hematuria and urgency  Musculoskeletal: Negative for arthralgias, gait problem and joint swelling  Skin: Negative for rash  Neurological: Negative for syncope, light-headedness and numbness  Hematological: Negative for adenopathy  Psychiatric/Behavioral: Negative for behavioral problems and sleep disturbance         Current Outpatient Medications on File Prior to Visit   Medication Sig   • albuterol (2 5 mg/3 mL) 0 083 % nebulizer solution INHALE THE CONTENTS OF 1 VIAL VIA NEBULIZER EVERY 6 HOURS AS NEEDED FOR WHEEZING OR SHORTNESS OF BREATH   • albuterol (PROVENTIL HFA,VENTOLIN HFA) 90 mcg/act inhaler Inhale 2 puffs every 4 (four) hours as needed for wheezing   • amLODIPine (NORVASC) 10 mg tablet TAKE 1 TABLET BY MOUTH EVERY DAY   • Cholecalciferol (VITAMIN D-3) 5000 units TABS Take 1 tablet by mouth daily   • Continuous Blood Gluc Sensor MISC Use Freestyle Calderon 3 Sensor   • cyclobenzaprine (FLEXERIL) 10 mg tablet Take 1 tablet (10 mg total) by mouth 3 (three) times a day as needed for muscle spasms BRAND ONLY PLEASE   • Farxiga 10 MG TABS TAKE 1 TABLET BY MOUTH EVERY DAY   • MICROLET LANCETS [facility] Microlet Lancet   • Mometasone Furoate (Asmanex HFA) 100 MCG/ACT AERO Inhale 1 puff (100 mcg total) 2 (two) times a day (Patient taking differently: Inhale 1 puff if needed)   • Mounjaro 15 MG/0 5ML    • pantoprazole (PROTONIX) 40 mg tablet Take 1 tablet by mouth daily   • rosuvastatin (CRESTOR) 5 mg tablet Take 1 tablet (5 mg total) by mouth daily   • [DISCONTINUED] glipiZIDE (GLUCOTROL XL) 10 mg 24 hr tablet TAKE 2 TABLETS (20MG TOTAL)DAILY       Objective     /72 (BP Location: Left arm, Patient Position: Sitting, Cuff Size: Large)   Pulse 84   Temp 98 4 °F (36 9 °C) (Temporal)   Resp 12   Ht 5' 6 5" (1 689 m)   Wt 95 3 kg (210 lb)   SpO2 96%   BMI 33 39 kg/m²     Physical Exam  Constitutional:       Appearance: Normal appearance  He is well-developed  He is obese  HENT:      Head: Normocephalic and atraumatic  Eyes:      General:         Right eye: No discharge  Left eye: No discharge  Conjunctiva/sclera: Conjunctivae normal       Pupils: Pupils are equal, round, and reactive to light  Neck:      Thyroid: No thyromegaly  Vascular: No JVD  Cardiovascular:      Rate and Rhythm: Normal rate and regular rhythm  Pulses: no weak pulses          Dorsalis pedis pulses are 1+ on the left side  Posterior tibial pulses are 1+ on the left side  Heart sounds: Normal heart sounds  No murmur heard  Pulmonary:      Effort: Pulmonary effort is normal       Breath sounds: Normal breath sounds  No wheezing or rales  Abdominal:      General: Bowel sounds are normal       Palpations: Abdomen is soft  There is no mass  Tenderness: There is no abdominal tenderness  There is no guarding or rebound  Musculoskeletal:         General: No tenderness or deformity  Normal range of motion  Cervical back: Neck supple  Feet:      Left foot:      Skin integrity: No ulcer, skin breakdown, erythema, warmth, callus or dry skin  Lymphadenopathy:      Cervical: No cervical adenopathy  Skin:     General: Skin is warm and dry        Findings: No erythema or rash    Neurological:      General: No focal deficit present  Mental Status: He is alert and oriented to person, place, and time  Psychiatric:         Mood and Affect: Mood normal          Behavior: Behavior normal          Thought Content: Thought content normal          Judgment: Judgment normal      Diabetic Foot Exam    Patient's shoes and socks removed  Left Foot/Ankle  Left Foot Inspection  Skin Exam: skin normal and skin intact  No dry skin, no warmth, no erythema, no maceration, normal color, no pre-ulcer, no ulcer and no callus  Toe Exam: ROM and strength within normal limits  Sensory   Monofilament testing: diminished    Vascular  The left DP pulse is 1+  The left PT pulse is 1+       Assign Risk Category  No deformity present  No loss of protective sensation  No weak pulses  Risk: 0        Emi Rizvi MD

## 2023-03-06 NOTE — PATIENT INSTRUCTIONS
CONTINUE CURRENT TREATMENT PLAN  MONITOR DIETARY SODIUM, CHOL, AND CARBOHYDRATE INTAKE  ENCOURAGE PHYSICAL ACTIVITY  FOOT CARE    RV 3 M, SOONER PRN      Recent Results (from the past 672 hour(s))   Comprehensive metabolic panel    Collection Time: 03/01/23  9:49 AM   Result Value Ref Range    Glucose, Random 165 (H) 65 - 99 mg/dL    BUN 13 7 - 25 mg/dL    Creatinine 0 90 0 70 - 1 30 mg/dL    eGFR 98 > OR = 60 mL/min/1 73m2    SL AMB BUN/CREATININE RATIO NOT APPLICABLE 6 - 22 (calc)    Sodium 138 135 - 146 mmol/L    Potassium 4 1 3 5 - 5 3 mmol/L    Chloride 105 98 - 110 mmol/L    CO2 24 20 - 32 mmol/L    Calcium 9 9 8 6 - 10 3 mg/dL    Protein, Total 7 1 6 1 - 8 1 g/dL    Albumin 4 3 3 6 - 5 1 g/dL    Globulin 2 8 1 9 - 3 7 g/dL (calc)    Albumin/Globulin Ratio 1 5 1 0 - 2 5 (calc)    TOTAL BILIRUBIN 0 4 0 2 - 1 2 mg/dL    Alkaline Phosphatase 86 35 - 144 U/L    AST 16 10 - 35 U/L    ALT 17 9 - 46 U/L   Lipid Panel with Direct LDL reflex    Collection Time: 03/01/23  9:49 AM   Result Value Ref Range    Total Cholesterol 176 <200 mg/dL    HDL 52 > OR = 40 mg/dL    Triglycerides 167 (H) <150 mg/dL    LDL Calculated 97 mg/dL (calc)    Chol HDLC Ratio 3 4 <5 0 (calc)    Non-HDL Cholesterol 124 <130 mg/dL (calc)   Hemoglobin A1c (w/out EAG)    Collection Time: 03/01/23  9:52 AM   Result Value Ref Range    Hemoglobin A1C 9 6 (H) <5 7 % of total Hgb

## 2023-03-06 NOTE — ASSESSMENT & PLAN NOTE
Lab Results   Component Value Date    HGBA1C 9 6 (H) 03/01/2023       COMPLIANT WITH MEDICATION  DENIES ANY NUMBNESS, TINGLING IN FEET    - DISCUSSED DIETARY MODIFICATION OF CARBOHYDRATES  - HGB A1C AND URINE STUDIES DUE TODAY  - FOOT CARE  - RV 3 MONTHS

## 2023-03-06 NOTE — LETTER
YENY RICHARDSON      Current Outpatient Medications:   •  albuterol (2 5 mg/3 mL) 0 083 % nebulizer solution, INHALE THE CONTENTS OF 1 VIAL VIA NEBULIZER EVERY 6 HOURS AS NEEDED FOR WHEEZING OR SHORTNESS OF BREATH, Disp: 180 mL, Rfl: 0  •  albuterol (PROVENTIL HFA,VENTOLIN HFA) 90 mcg/act inhaler, Inhale 2 puffs every 4 (four) hours as needed for wheezing, Disp: 6 7 g, Rfl: 3  •  amLODIPine (NORVASC) 10 mg tablet, TAKE 1 TABLET BY MOUTH EVERY DAY, Disp: 90 tablet, Rfl: 3  •  Cholecalciferol (VITAMIN D-3) 5000 units TABS, Take 1 tablet by mouth daily, Disp: , Rfl:   •  Continuous Blood Gluc Sensor MISC, Use Freestyle Calderon 3 Sensor, Disp: , Rfl:   •  cyclobenzaprine (FLEXERIL) 10 mg tablet, Take 1 tablet (10 mg total) by mouth 3 (three) times a day as needed for muscle spasms BRAND ONLY PLEASE, Disp: 45 tablet, Rfl: 1  •  Farxiga 10 MG TABS, TAKE 1 TABLET BY MOUTH EVERY DAY, Disp: 90 tablet, Rfl: 3  •  glipiZIDE (GLUCOTROL XL) 10 mg 24 hr tablet, TAKE 2 TABLETS (20MG TOTAL)DAILY, Disp: 180 tablet, Rfl: 1  •  MICROLET LANCETS MISC, Microlet Lancet, Disp: , Rfl:   •  Mometasone Furoate (Asmanex HFA) 100 MCG/ACT AERO, Inhale 1 puff (100 mcg total) 2 (two) times a day (Patient taking differently: Inhale 1 puff if needed), Disp: 13 g, Rfl: 3  •  Mounjaro 15 MG/0 5ML, , Disp: , Rfl:   •  pantoprazole (PROTONIX) 40 mg tablet, Take 1 tablet by mouth daily, Disp: , Rfl:   •  rosuvastatin (CRESTOR) 5 mg tablet, Take 1 tablet (5 mg total) by mouth daily, Disp: 90 tablet, Rfl: 3    Recent Results (from the past 672 hour(s))   Comprehensive metabolic panel    Collection Time: 03/01/23  9:49 AM   Result Value Ref Range    Glucose, Random 165 (H) 65 - 99 mg/dL    BUN 13 7 - 25 mg/dL    Creatinine 0 90 0 70 - 1 30 mg/dL    eGFR 98 > OR = 60 mL/min/1 73m2    SL AMB BUN/CREATININE RATIO NOT APPLICABLE 6 - 22 (calc)    Sodium 138 135 - 146 mmol/L    Potassium 4 1 3 5 - 5 3 mmol/L    Chloride 105 98 - 110 mmol/L    CO2 24 20 - 32 mmol/L Calcium 9 9 8 6 - 10 3 mg/dL    Protein, Total 7 1 6 1 - 8 1 g/dL    Albumin 4 3 3 6 - 5 1 g/dL    Globulin 2 8 1 9 - 3 7 g/dL (calc)    Albumin/Globulin Ratio 1 5 1 0 - 2 5 (calc)    TOTAL BILIRUBIN 0 4 0 2 - 1 2 mg/dL    Alkaline Phosphatase 86 35 - 144 U/L    AST 16 10 - 35 U/L    ALT 17 9 - 46 U/L   Lipid Panel with Direct LDL reflex    Collection Time: 03/01/23  9:49 AM   Result Value Ref Range    Total Cholesterol 176 <200 mg/dL    HDL 52 > OR = 40 mg/dL    Triglycerides 167 (H) <150 mg/dL    LDL Calculated 97 mg/dL (calc)    Chol HDLC Ratio 3 4 <5 0 (calc)    Non-HDL Cholesterol 124 <130 mg/dL (calc)   Hemoglobin A1c (w/out EAG)    Collection Time: 03/01/23  9:52 AM   Result Value Ref Range    Hemoglobin A1C 9 6 (H) <5 7 % of total Hgb

## 2023-03-24 ENCOUNTER — HOSPITAL ENCOUNTER (OUTPATIENT)
Dept: RADIOLOGY | Facility: HOSPITAL | Age: 58
Discharge: HOME | End: 2023-03-24

## 2023-03-24 DIAGNOSIS — M54.31 SCIATICA OF RIGHT SIDE: ICD-10-CM

## 2023-03-24 DIAGNOSIS — M54.16 LUMBAR RADICULOPATHY: ICD-10-CM

## 2023-04-02 DIAGNOSIS — M54.16 LUMBAR RADICULOPATHY: Primary | ICD-10-CM

## 2023-04-03 ENCOUNTER — TELEPHONE (OUTPATIENT)
Dept: FAMILY MEDICINE CLINIC | Facility: CLINIC | Age: 58
End: 2023-04-03

## 2023-04-03 NOTE — TELEPHONE ENCOUNTER
Tee Martinez has an appt with pain management on 4/20  However, he is leaving on vacation 4/9      He wanted to know if you can give him that shot like you did the last time    He wants to be able to walk around when he is on vacation

## 2023-04-05 RX ORDER — ATORVASTATIN CALCIUM 10 MG/1
TABLET, FILM COATED ORAL
COMMUNITY
Start: 2023-03-17

## 2023-04-06 ENCOUNTER — OFFICE VISIT (OUTPATIENT)
Dept: FAMILY MEDICINE CLINIC | Facility: CLINIC | Age: 58
End: 2023-04-06

## 2023-04-06 VITALS
OXYGEN SATURATION: 97 % | HEART RATE: 88 BPM | TEMPERATURE: 98.5 F | BODY MASS INDEX: 32.8 KG/M2 | DIASTOLIC BLOOD PRESSURE: 90 MMHG | WEIGHT: 209 LBS | RESPIRATION RATE: 12 BRPM | HEIGHT: 67 IN | SYSTOLIC BLOOD PRESSURE: 132 MMHG

## 2023-04-06 DIAGNOSIS — E11.9 TYPE 2 DIABETES MELLITUS WITHOUT COMPLICATION, WITHOUT LONG-TERM CURRENT USE OF INSULIN (HCC): ICD-10-CM

## 2023-04-06 DIAGNOSIS — M54.16 LUMBAR RADICULOPATHY: Primary | ICD-10-CM

## 2023-04-06 RX ORDER — METHYLPREDNISOLONE ACETATE 40 MG/ML
40 INJECTION, SUSPENSION INTRA-ARTICULAR; INTRALESIONAL; INTRAMUSCULAR; SOFT TISSUE ONCE
Status: COMPLETED | OUTPATIENT
Start: 2023-04-06 | End: 2023-04-06

## 2023-04-06 RX ORDER — DEXAMETHASONE SODIUM PHOSPHATE 4 MG/ML
4 INJECTION, SOLUTION INTRA-ARTICULAR; INTRALESIONAL; INTRAMUSCULAR; INTRAVENOUS; SOFT TISSUE ONCE
Status: COMPLETED | OUTPATIENT
Start: 2023-04-06 | End: 2023-04-06

## 2023-04-06 RX ADMIN — METHYLPREDNISOLONE ACETATE 40 MG: 40 INJECTION, SUSPENSION INTRA-ARTICULAR; INTRALESIONAL; INTRAMUSCULAR; SOFT TISSUE at 13:28

## 2023-04-06 RX ADMIN — DEXAMETHASONE SODIUM PHOSPHATE 4 MG: 4 INJECTION, SOLUTION INTRA-ARTICULAR; INTRALESIONAL; INTRAMUSCULAR; INTRAVENOUS; SOFT TISSUE at 13:26

## 2023-04-06 NOTE — PROGRESS NOTES
Name: Katina Mckenzie      : 1965      MRN: [id number]  Encounter Provider: Suzette Bergeron MD  Encounter Date: 2023   Encounter department: [address] [place] [address]     1  Lumbar radiculopathy  -     dexamethasone (DECADRON) injection 4 mg  -     methylPREDNISolone acetate (DEPO-MEDROL) injection 40 mg    2  Type 2 diabetes mellitus without complication, without long-term current use of insulin ([address] )         Subjective      SCIATIC ACTING UP AGAIN  NO REAL CHANGE  GOING AWAY AND WALKING A LOT  WOULD LIKE INJECTION    DISCUSSED PLAN    Review of Systems   Constitutional: Negative for chills, fatigue and fever  HENT: Negative for sore throat  Eyes: Negative for discharge  Respiratory: Negative for cough and chest tightness  Cardiovascular: Negative for chest pain and palpitations  Gastrointestinal: Negative for abdominal pain, diarrhea, nausea and vomiting  Musculoskeletal: Positive for arthralgias and back pain  Negative for gait problem  Neurological: Negative for dizziness, weakness and headaches  Hematological: Negative for adenopathy  Psychiatric/Behavioral: The patient is not nervous/anxious          Current Outpatient Medications on File Prior to Visit   Medication Sig   • albuterol (2 5 mg/3 mL) 0 083 % nebulizer solution INHALE THE CONTENTS OF 1 VIAL VIA NEBULIZER EVERY 6 HOURS AS NEEDED FOR WHEEZING OR SHORTNESS OF BREATH   • albuterol (PROVENTIL HFA,VENTOLIN HFA) 90 mcg/act inhaler Inhale 2 puffs every 4 (four) hours as needed for wheezing   • amLODIPine (NORVASC) 10 mg tablet TAKE 1 TABLET BY MOUTH EVERY DAY   • atorvastatin (LIPITOR) 10 mg tablet    • Cholecalciferol (VITAMIN D-3) 5000 units TABS Take 1 tablet by mouth daily   • Continuous Blood Gluc Sensor MISC Use Freestyle Calderon 3 Sensor   • cyclobenzaprine (FLEXERIL) 10 mg tablet Take 1 tablet (10 mg total) by mouth 3 (three) times a day as needed for muscle spasms BRAND ONLY "PLEASE   • Farxiga 10 MG TABS TAKE 1 TABLET BY MOUTH EVERY DAY   • glipiZIDE (GLUCOTROL XL) 10 mg 24 hr tablet Take 1 tablet (10 mg total) by mouth daily   • MICROLET LANCETS MISC Microlet Lancet   • Mometasone Furoate (Asmanex HFA) 100 MCG/ACT AERO Inhale 1 puff (100 mcg total) 2 (two) times a day (Patient taking differently: Inhale 1 puff if needed)   • Mounjaro 15 MG/0 5ML    • pantoprazole (PROTONIX) 40 mg tablet Take 1 tablet by mouth daily   • rosuvastatin (CRESTOR) 5 mg tablet Take 1 tablet (5 mg total) by mouth daily       Objective     /90 (BP Location: Left arm, Patient Position: Sitting, Cuff Size: Large)   Pulse 88   Temp 98 5 °F (36 9 °C) (Temporal)   Resp 12   Ht 5' 6 5\" (1 689 m)   Wt 94 8 kg (209 lb)   SpO2 97%   BMI 33 23 kg/m²     Physical Exam     NO PE PERFORMED    LENGTH OF VISIT 15 MIN  LENGTH OF  15 MIN  Angela Roa MD  "

## 2023-05-08 ENCOUNTER — TELEPHONE (OUTPATIENT)
Dept: PAIN MEDICINE | Facility: CLINIC | Age: 58
End: 2023-05-08

## 2023-05-08 ENCOUNTER — TELEPHONE (OUTPATIENT)
Dept: FAMILY MEDICINE CLINIC | Facility: CLINIC | Age: 58
End: 2023-05-08

## 2023-05-08 DIAGNOSIS — G89.4 CHRONIC PAIN SYNDROME: Primary | ICD-10-CM

## 2023-05-08 NOTE — TELEPHONE ENCOUNTER
Caller: Patient  Doctor: Sofiya Carreno    Reason for call:  Pt saw you for a consult and his back pain came back   At that time you told him that if it does come back to call and you would schedule a procedure    Call back#: [phone]

## 2023-05-08 NOTE — TELEPHONE ENCOUNTER
Chriss Tinoco called and left the following message on the [employer]    Onofre liceaeron, this is Chriss Tinoco  I'm calling I guess to talk to Doctor Kenneth Medley  So my back issue came back again  My right leg is really hurting  Call Doctor Stacey Suarez who was Shonda Keene do the procedure  For the back of my spine  But he can't see me for three weeks and currently it's really hard to walk  So I just wanted to talk to Doctor Kenneth Medley to see what my options are  If he wanted to maybe do an injection again  I don't know  I'm kind of stuck in the middle here It was doing good and then all of a sudden it just started hurting  And again, the other pain  Doctor can't see me for three weeks  So anyway, if it's at all possible, I'd like to talk to Doctor [facility] and see if there's any other options in between  Now and then again, this is Chriss Tinoco and you yesy can call me back when you get a chance  Have a good day  Thank you        Please advise

## 2023-05-08 NOTE — TELEPHONE ENCOUNTER
S/w pt, states his pain is definitely greater than 5/10, would like injection be scheduled sooner  Thank you

## 2023-05-08 NOTE — TELEPHONE ENCOUNTER
Informed Radha Pablo of Dr Paredes's response and appt scheduled for tomorrow, No further action required

## 2023-05-08 NOTE — TELEPHONE ENCOUNTER
Scheduled patient for TFESI on 5/26/23  Patient denies RX blood thinners/ NSAIDS  Nothing to eat or drink 1 hour prior to procedure  Needs to arrange transportation  Proper clothing for procedure  No vaccines 2 weeks prior or after procedure  If ill or place on antibiotics, please call to reschedule    He is a diabetic with a CGM on his Arm

## 2023-05-08 NOTE — TELEPHONE ENCOUNTER
RENU   PLEASE SET HIM UP FOR AN INJECTION TOMORROW TO SEE IF WE CAN HOLD HIM OVER TILL HE SEES PAIN MANAGEMENT    THANKS

## 2023-05-09 ENCOUNTER — OFFICE VISIT (OUTPATIENT)
Dept: FAMILY MEDICINE CLINIC | Facility: CLINIC | Age: 58
End: 2023-05-09

## 2023-05-09 VITALS
TEMPERATURE: 98.1 F | RESPIRATION RATE: 12 BRPM | HEART RATE: 84 BPM | DIASTOLIC BLOOD PRESSURE: 90 MMHG | WEIGHT: 205 LBS | HEIGHT: 67 IN | BODY MASS INDEX: 32.18 KG/M2 | OXYGEN SATURATION: 97 % | SYSTOLIC BLOOD PRESSURE: 120 MMHG

## 2023-05-09 DIAGNOSIS — M54.16 LUMBAR RADICULOPATHY: Primary | ICD-10-CM

## 2023-05-09 RX ORDER — DEXAMETHASONE SODIUM PHOSPHATE 4 MG/ML
4 INJECTION, SOLUTION INTRA-ARTICULAR; INTRALESIONAL; INTRAMUSCULAR; INTRAVENOUS; SOFT TISSUE ONCE
Status: COMPLETED | OUTPATIENT
Start: 2023-05-09 | End: 2023-05-09

## 2023-05-09 RX ORDER — PREDNISONE 20 MG/1
TABLET ORAL
Qty: 14 TABLET | Refills: 0 | Status: SHIPPED | OUTPATIENT
Start: 2023-05-09

## 2023-05-09 RX ORDER — KETOROLAC TROMETHAMINE 30 MG/ML
60 INJECTION, SOLUTION INTRAMUSCULAR; INTRAVENOUS ONCE
Status: COMPLETED | OUTPATIENT
Start: 2023-05-09 | End: 2023-05-09

## 2023-05-09 RX ADMIN — DEXAMETHASONE SODIUM PHOSPHATE 4 MG: 4 INJECTION, SOLUTION INTRA-ARTICULAR; INTRALESIONAL; INTRAMUSCULAR; INTRAVENOUS; SOFT TISSUE at 10:18

## 2023-05-09 RX ADMIN — KETOROLAC TROMETHAMINE 60 MG: 30 INJECTION, SOLUTION INTRAMUSCULAR; INTRAVENOUS at 10:20

## 2023-05-09 NOTE — PROGRESS NOTES
Name: Trevor Vyas      : 1965      MRN: [id number]  Encounter Provider: Kailee Britt MD  Encounter Date: 2023   Encounter department: [address] [place] [address]     1  Lumbar radiculopathy  -     ketorolac (TORADOL) 60 mg/2 mL IM injection 60 mg  -     dexamethasone (DECADRON) injection 4 mg  -     predniSONE 20 mg tablet; 2 PO QD X 4 DAYS, THEN 1 PO QD X 4 DAYS, THEN 1/2 PO QD X 4 DAYS         Subjective      PATIENT RETURNS  WAS MOVING AROUND BOXES  BACK PAIN RETURNED  RADIATING DOWN L LEG  SOME TINGLING    Review of Systems   Constitutional: Negative for chills, fatigue and fever  HENT: Negative for sore throat  Eyes: Negative for discharge  Respiratory: Negative for cough and chest tightness  Cardiovascular: Negative for chest pain and palpitations  Gastrointestinal: Negative for abdominal pain, diarrhea, nausea and vomiting  Musculoskeletal: Positive for arthralgias and back pain  Negative for gait problem  Neurological: Positive for numbness  Negative for dizziness, weakness and headaches  Hematological: Negative for adenopathy  Psychiatric/Behavioral: The patient is not nervous/anxious          Current Outpatient Medications on File Prior to Visit   Medication Sig   • albuterol (2 5 mg/3 mL) 0 083 % nebulizer solution INHALE THE CONTENTS OF 1 VIAL VIA NEBULIZER EVERY 6 HOURS AS NEEDED FOR WHEEZING OR SHORTNESS OF BREATH   • albuterol (PROVENTIL HFA,VENTOLIN HFA) 90 mcg/act inhaler Inhale 2 puffs every 4 (four) hours as needed for wheezing   • amLODIPine (NORVASC) 10 mg tablet TAKE 1 TABLET BY MOUTH EVERY DAY   • Cholecalciferol (VITAMIN D-3) 5000 units TABS Take 1 tablet by mouth daily   • Continuous Blood Gluc Sensor MISC Use Freestyle Calderon 3 Sensor   • cyclobenzaprine (FLEXERIL) 10 mg tablet Take 1 tablet (10 mg total) by mouth 3 (three) times a day as needed for muscle spasms BRAND ONLY PLEASE   • Farxiga 10 MG TABS TAKE 1 "TABLET BY MOUTH EVERY DAY   • glipiZIDE (GLUCOTROL XL) 10 mg 24 hr tablet Take 1 tablet (10 mg total) by mouth daily   • MICROLET LANCETS MISC Microlet Lancet   • Mometasone Furoate (Asmanex HFA) 100 MCG/ACT AERO Inhale 1 puff (100 mcg total) 2 (two) times a day (Patient taking differently: Inhale 1 puff if needed)   • Mounjaro 15 MG/0 5ML    • pantoprazole (PROTONIX) 40 mg tablet Take 1 tablet by mouth daily   • rosuvastatin (CRESTOR) 5 mg tablet Take 1 tablet (5 mg total) by mouth daily   • [DISCONTINUED] atorvastatin (LIPITOR) 10 mg tablet  (Patient not taking: Reported on 4/20/2023)       Objective     /90 (BP Location: Right arm, Patient Position: Sitting, Cuff Size: Large)   Pulse 84   Temp 98 1 °F (36 7 °C) (Temporal)   Resp 12   Ht 5' 6 5\" (1 689 m)   Wt 93 kg (205 lb)   SpO2 97%   BMI 32 59 kg/m²     Physical Exam  Constitutional:       Appearance: He is well-developed  HENT:      Head: Normocephalic and atraumatic  Eyes:      General:         Right eye: No discharge  Left eye: No discharge  Conjunctiva/sclera: Conjunctivae normal       Pupils: Pupils are equal, round, and reactive to light  Neck:      Thyroid: No thyromegaly  Vascular: No JVD  Cardiovascular:      Rate and Rhythm: Normal rate and regular rhythm  Heart sounds: Normal heart sounds  No murmur heard  Pulmonary:      Effort: Pulmonary effort is normal       Breath sounds: Normal breath sounds  No wheezing or rales  Abdominal:      General: Bowel sounds are normal       Palpations: Abdomen is soft  There is no mass  Tenderness: There is no abdominal tenderness  There is no guarding or rebound  Musculoskeletal:         General: Tenderness present  No deformity  Cervical back: Neck supple  Lymphadenopathy:      Cervical: No cervical adenopathy  Skin:     General: Skin is warm and dry  Findings: No erythema or rash     Neurological:      Mental Status: He is alert and oriented to " person, place, and time  Psychiatric:         Behavior: Behavior normal          Thought Content:  Thought content normal          Judgment: Judgment normal        Claudetta Flavors, MD

## 2023-05-10 DIAGNOSIS — E11.9 TYPE 2 DIABETES MELLITUS WITHOUT COMPLICATION, WITHOUT LONG-TERM CURRENT USE OF INSULIN (HCC): ICD-10-CM

## 2023-05-10 RX ORDER — DAPAGLIFLOZIN 10 MG/1
TABLET, FILM COATED ORAL
Qty: 90 TABLET | Refills: 3 | Status: SHIPPED | OUTPATIENT
Start: 2023-05-10

## 2023-05-15 RX ORDER — GABAPENTIN 300 MG/1
300 CAPSULE ORAL 3 TIMES DAILY
Qty: 90 CAPSULE | Refills: 0 | Status: SHIPPED | OUTPATIENT
Start: 2023-05-15

## 2023-05-15 NOTE — TELEPHONE ENCOUNTER
Caller: Erica Cool PT    Doctor: Dr Lyndsey Peña    Reason for call: Procedure     Call back#: [phone]

## 2023-05-15 NOTE — TELEPHONE ENCOUNTER
Caller: patient     Doctor: James Mckeon    Reason for call: pt states his low back & right leg pain is worsening  His procedure is scheduled 5/29/23 & he would like to know what he can do for pain relief prior to his procedure       Call back#: [phone]

## 2023-05-15 NOTE — TELEPHONE ENCOUNTER
"S/w pt who states that he is having constant \"needling\" Lower back pain radiating down his right leg that becomes tingling at his calf and if he walks too  long his foot becomes numb  Pt is taking Tylenol and advised can take 1000 mg q 8 hrs with no more than 3000 mg/24hrs  Advised if NSAIDS help take no more that 2400 mg /24 hrs  Pt has tried Hemp cream that did not help  Pt has Voltaren cream and will try that and OTC lidocaine patches  Pt has a script for flexeril that is  and he has none left  Pt requests refill if AS thinks this will help  Pt has not tried any neuropathic pain meds  Pt has procedure   Advised will discuss with AS and cb with any recommendations  s   "

## 2023-05-26 ENCOUNTER — HOSPITAL ENCOUNTER (OUTPATIENT)
Facility: AMBULARY SURGERY CENTER | Age: 58
Setting detail: OUTPATIENT SURGERY
Discharge: HOME | End: 2023-05-26
Attending: ANESTHESIOLOGY | Admitting: ANESTHESIOLOGY

## 2023-05-26 ENCOUNTER — APPOINTMENT (OUTPATIENT)
Dept: RADIOLOGY | Facility: HOSPITAL | Age: 58
End: 2023-05-26

## 2023-05-26 VITALS
OXYGEN SATURATION: 97 % | RESPIRATION RATE: 18 BRPM | SYSTOLIC BLOOD PRESSURE: 142 MMHG | DIASTOLIC BLOOD PRESSURE: 100 MMHG | TEMPERATURE: 97.7 F | HEART RATE: 82 BPM

## 2023-05-26 RX ORDER — LIDOCAINE WITH 8.4% SOD BICARB 0.9%(10ML)
SYRINGE (ML) INJECTION AS NEEDED
Status: DISCONTINUED | OUTPATIENT
Start: 2023-05-26 | End: 2023-05-26 | Stop reason: HOSPADM

## 2023-05-26 RX ORDER — BUPIVACAINE HYDROCHLORIDE 2.5 MG/ML
INJECTION, SOLUTION EPIDURAL; INFILTRATION; INTRACAUDAL AS NEEDED
Status: DISCONTINUED | OUTPATIENT
Start: 2023-05-26 | End: 2023-05-26 | Stop reason: HOSPADM

## 2023-05-26 RX ORDER — METHYLPREDNISOLONE ACETATE 40 MG/ML
INJECTION, SUSPENSION INTRA-ARTICULAR; INTRALESIONAL; INTRAMUSCULAR; SOFT TISSUE AS NEEDED
Status: DISCONTINUED | OUTPATIENT
Start: 2023-05-26 | End: 2023-05-26 | Stop reason: HOSPADM

## 2023-05-26 NOTE — H&P
History of Present Illness: The patient is a 60 y o  male who presents with complaints of low back pain and right lower extremity radiculopathy  Past Medical History:   Diagnosis Date   • Asthma    • Diabetes mellitus ([facility] [address] )    • High cholesterol    • Hypertension        History reviewed  No pertinent surgical history  No current facility-administered medications for this encounter  No Known Allergies    Physical Exam:   Vitals:    05/26/23 0902   BP: 137/97   Pulse: 82   Resp: 16   Temp: 97 7 °F (36 5 °C)   SpO2: 96%     General: Awake, Alert, Oriented x 3, Mood and affect appropriate  Respiratory: Respirations even and unlabored  Cardiovascular: Peripheral pulses intact; no edema  Musculoskeletal Exam: Tenderness in lumbar spine region    ASA Score: 2    Patient/Chart Verification  Patient ID Verified: Verbal, Armband  ID Band Applied: Yes  Consents Confirmed: Procedural  H&P( within 30 days) Verified: Yes  Interval H&P(within 24 hr) Complete (required for Outpatients and Surgery Admit only): Yes  Pre-op Lab/Test Results Available: N/A  Does Patient Have a Prosthetic Device/Implant: No    Assessment: Low back pain and right lower extremity radiculopathy      Plan: Proceed with right L4 and L5 transforaminal epidural steroid injection

## 2023-05-26 NOTE — OP NOTE
ATTENDING PHYSICIAN:  Xavi Gutierrez MD     PROCEDURE:  1  Right L4 transforaminal epidural steroid injection under fluoroscopic guidance  2  Right L5 transforaminal epidural steroid injection under fluoroscopic guidance  PRE-PROCEDURE DIAGNOSIS: Low back pain and right lower extremity radiculopathy  POST-PROCEDURE DIAGNOSIS: Low back pain and right lower extremity radiculopathy  ANESTHESIA:  Local     ESTIMATED BLOOD LOSS:  Minimal     COMPLICATIONS:  None  LOCATION:  [address]  CONSENT:  Today's procedure, its potential benefits as well as its risks and potential side effects were reviewed  Discussed risks of the procedure including bleeding, infection, nerve irritation or damage, reactions to the medications, weakness, headache, failure of the pain to improve, and potential worsening of the pain were explained to the patient who verbalized understanding and who wished to proceed  Written informed consent was thereby obtained  DESCRIPTION OF THE PROCEDURE:  After written informed consent was obtained, the patient was taken to the fluoroscopy suite and placed in the prone position  Anatomical landmarks were identified by way of fluoroscopy in multiple views  The skin of the lumbar region was prepped using antiseptic and draped in the usual sterile fashion  Strict aseptic technique was utilized  The skin and subcutaneous tissues at the needle entry site were infiltrated with a total of 5 mL of 1% preservative-free lidocaine using a 25-gauge 1-1/2-inch needle  22-gauge needles were then incrementally advanced under fluoroscopic guidance in the oblique view into the neural foramina as mentioned above  Proper placement into each of the neural foramen was confirmed with fluoroscopy in both the lateral and AP views   After negative aspiration for CSF or heme, contrast was injected, which delineated the nerve roots and the epidural space under fluoroscopy in the AP view  There was only a transient pressure paresthesia that resolved immediately upon injection  After negative aspiration, a 2 mL of a 4 mL injectate consisting of 3 mL of preservative-free 0 25% bupivacaine and 1 mL of Depo-Medrol 80 mg/mL was slowly injected into each of the needles as delineated above  The patient tolerated the procedure well and all needles were removed intact  Hemostasis was maintained  There were no apparent paresthesias or complications  The skin was wiped clean and a Band-Aid was placed as appropriate  The patient was monitored for an appropriate period of time and remained hemodynamically stable following the procedure  The patient was ultimately discharged to home with supervision in good condition and instructed to call the office in approximately 7-10 days with an update or sooner as warranted  Discharge instructions were provided  I was present for and participated in all key and critical portions of this procedure      Marcello Carrillo MD  5/26/2023  9:53 AM

## 2023-05-26 NOTE — DISCHARGE INSTRUCTIONS
Epidural Steroid Injection   WHAT YOU NEED TO KNOW:   An epidural steroid injection (ANNA) is a procedure to inject steroid medicine into the epidural space  The epidural space is between your spinal cord and vertebrae  Steroids reduce inflammation and fluid buildup in your spine that may be causing pain  You may be given pain medicine along with the steroids  ACTIVITY  Do not drive or operate machinery today  No strenuous activity today - bending, lifting, etc   You may resume normal activites starting tomorrow - start slowly and as tolerated  You may shower today, but no tub baths or hot tubs  You may have numbness for several hours from the local anesthetic  Please use caution and common sense, especially with weight-bearing activities  CARE OF THE INJECTION SITE  If you have soreness or pain, apply ice to the area today (20 minutes on/20 minutes off)  Starting tomorrow, you may use warm, moist heat or ice if needed  You may have an increase or change in your discomfort for 36-48 hours after your treatment  Apply ice and continue with any pain medication you have been prescribed  Notify the [facility] if you have any of the following: redness, drainage, swelling, headache, stiff neck or fever above 100°F     SPECIAL INSTRUCTIONS  Our office will contact you in approximately 7 days for a progress report  MEDICATIONS  Continue to take all routine medications  Our office may have instructed you to hold some medications  As no general anesthesia was used in today's procedure, you should not experience any side effects related to anesthesia  If you are diabetic, the steroids used in today's injection may temporarily increase your blood sugar levels after the first few days after your injection  Please keep a close eye on your sugars and alert the doctor who manages your diabetes if your sugars are significantly high from your baseline or you are symptomatic       If you have a problem specifically related to your procedure, please call our office at [phone]  Problems not related to your procedure should be directed to your primary care physician

## 2023-05-31 ENCOUNTER — TELEPHONE (OUTPATIENT)
Dept: FAMILY MEDICINE CLINIC | Facility: CLINIC | Age: 58
End: 2023-05-31

## 2023-05-31 NOTE — TELEPHONE ENCOUNTER
Kana Herrera had his spinal procedure last week, but he states he still has pain  He states if he is on the floor with a towel rolled under his back he feels better  He thinks its an alignment issues  Kana Herrera asked if you could recommend a chiropractor?   [phone]

## 2023-06-02 LAB
BUN SERPL-MCNC: 14 MG/DL (ref 7–25)
BUN/CREAT SERPL: ABNORMAL (CALC) (ref 6–22)
CALCIUM SERPL-MCNC: 9.4 MG/DL (ref 8.6–10.3)
CHLORIDE SERPL-SCNC: 104 MMOL/L (ref 98–110)
CO2 SERPL-SCNC: 26 MMOL/L (ref 20–32)
CREAT SERPL-MCNC: 0.8 MG/DL (ref 0.7–1.3)
GFR/BSA.PRED SERPLBLD CYS-BASED-ARV: 102 ML/MIN/1.73M2
GLUCOSE SERPL-MCNC: 108 MG/DL (ref 65–99)
POTASSIUM SERPL-SCNC: 3.9 MMOL/L (ref 3.5–5.3)
SODIUM SERPL-SCNC: 138 MMOL/L (ref 135–146)

## 2023-06-05 ENCOUNTER — OFFICE VISIT (OUTPATIENT)
Dept: FAMILY MEDICINE CLINIC | Facility: CLINIC | Age: 58
End: 2023-06-05
Payer: COMMERCIAL

## 2023-06-05 VITALS
RESPIRATION RATE: 12 BRPM | TEMPERATURE: 97.6 F | WEIGHT: 206 LBS | BODY MASS INDEX: 32.33 KG/M2 | HEIGHT: 67 IN | OXYGEN SATURATION: 97 % | HEART RATE: 80 BPM | DIASTOLIC BLOOD PRESSURE: 76 MMHG | SYSTOLIC BLOOD PRESSURE: 122 MMHG

## 2023-06-05 DIAGNOSIS — K21.00 GASTROESOPHAGEAL REFLUX DISEASE WITH ESOPHAGITIS, UNSPECIFIED WHETHER HEMORRHAGE: ICD-10-CM

## 2023-06-05 DIAGNOSIS — E11.9 TYPE 2 DIABETES MELLITUS WITHOUT COMPLICATION, WITHOUT LONG-TERM CURRENT USE OF INSULIN (HCC): Primary | ICD-10-CM

## 2023-06-05 DIAGNOSIS — I10 ESSENTIAL HYPERTENSION: ICD-10-CM

## 2023-06-05 DIAGNOSIS — E78.2 MIXED HYPERLIPIDEMIA: ICD-10-CM

## 2023-06-05 LAB — SL AMB POCT HEMOGLOBIN AIC: 8.7 (ref ?–6.5)

## 2023-06-05 PROCEDURE — 83036 HEMOGLOBIN GLYCOSYLATED A1C: CPT | Performed by: FAMILY MEDICINE

## 2023-06-05 PROCEDURE — 99214 OFFICE O/P EST MOD 30 MIN: CPT | Performed by: FAMILY MEDICINE

## 2023-06-05 RX ORDER — ATORVASTATIN CALCIUM 10 MG/1
TABLET, FILM COATED ORAL
COMMUNITY

## 2023-06-05 RX ORDER — CLINDAMYCIN HYDROCHLORIDE 300 MG/1
CAPSULE ORAL
COMMUNITY
End: 2023-06-05 | Stop reason: HOSPADM

## 2023-06-05 RX ORDER — MOMETASONE FUROATE 100 UG/1
AEROSOL RESPIRATORY (INHALATION) DAILY
COMMUNITY
End: 2023-06-05 | Stop reason: SDUPTHER

## 2023-06-05 RX ORDER — ALPRAZOLAM 0.5 MG/1
TABLET ORAL
COMMUNITY

## 2023-06-05 NOTE — PATIENT INSTRUCTIONS
CONTINUE CURRENT TREATMENT PLAN  MONITOR DIETARY SODIUM, CHOL, AND CARBOHYDRATE INTAKE  ENCOURAGE PHYSICAL ACTIVITY  FOOT CARE    RV 3 M, SOONER PRN      Recent Results (from the past 672 hour(s))   Basic metabolic panel    Collection Time: 06/01/23  2:07 PM   Result Value Ref Range    Glucose, Random 108 (H) 65 - 99 mg/dL    BUN 14 7 - 25 mg/dL    Creatinine 0 80 0 70 - 1 30 mg/dL    eGFR 102 > OR = 60 mL/min/1 73m2    SL AMB BUN/CREATININE RATIO NOT APPLICABLE 6 - 22 (calc)    Sodium 138 135 - 146 mmol/L    Potassium 3 9 3 5 - 5 3 mmol/L    Chloride 104 98 - 110 mmol/L    CO2 26 20 - 32 mmol/L    Calcium 9 4 8 6 - 10 3 mg/dL       Type 2 Diabetes Management for Adults   AMBULATORY CARE:   Type 2 diabetes  is a disease that affects how your body uses glucose (sugar)  Either your body cannot make enough insulin, or it cannot use the insulin correctly  It is important to keep diabetes controlled to prevent damage to your heart, blood vessels, and other organs  Management will help you feel well and enjoy your daily activities  Your diabetes care team providers can help you make a plan to fit diabetes care into your schedule  Your plan can change over time to fit your needs and your family's needs  Have someone call your local emergency number (911 in the [address] [facility]) if:   You cannot be woken      You have signs of diabetic ketoacidosis:     confusion, fatigue    vomiting    rapid heartbeat    fruity smelling breath    extreme thirst    dry mouth and skin    You have any of the following signs of a heart attack:      Squeezing, pressure, or pain in your chest    You may  also have any of the following:     Discomfort or pain in your back, neck, jaw, stomach, or arm    Shortness of breath    Nausea or vomiting    Lightheadedness or a sudden cold sweat    You have any of the following signs of a stroke:      Numbness or drooping on one side of your face     Weakness in an arm or leg    Confusion or difficulty speaking    Dizziness, a severe headache, or vision loss    Call your doctor or diabetes care team provider if:   You have a sore or wound that will not heal     You have a change in the amount you urinate  Your blood sugar levels are higher than your target goals  You often have lower blood sugar levels than your target goals  Your skin is red, dry, warm, or swollen  You have trouble coping with diabetes, or you feel anxious or depressed  You have questions or concerns about your condition or care  What you need to know about high blood sugar levels:  High blood sugar levels may not cause any symptoms  You may feel more thirsty or urinate more often than usual  Over time, high blood sugar levels can damage your nerves, blood vessels, tissues, and organs  The following can increase your blood sugar levels:  Large meals or large amounts of carbohydrates at one time    Less physical activity    Stress    Illness    A lower dose of diabetes medicine or insulin, or a late dose    What you need to know about low blood sugar levels:  Symptoms include feeling shaky, dizzy, irritable, or confused  You can prevent symptoms by keeping your blood sugar levels from going too low  Treat a low blood sugar level right away:      Drink 4 ounces of juice or have 1 tube of glucose gel  Check your blood sugar level again 10 to 15 minutes later  When the level goes back to normal, eat a meal or snack to prevent another decrease  Keep glucose gel, raisins, or hard candy with you at all times to treat a low blood sugar level  Your blood sugar level can get too low if you take diabetes medicine or insulin and do not eat enough food  If you use insulin, check your blood sugar level before you exercise  If your blood sugar level is below 100 mg/dL, eat 4 crackers or 2 ounces of raisins, or drink 4 ounces of juice  Check your level every 30 minutes if you exercise longer than 1 hour      You may need a snack during or after exercise  What you can do to manage your blood sugar levels:   Check your blood sugar levels as directed and as needed  Several items are available to use to check your levels  You may need to check by testing a drop of blood in a glucose monitor  You may instead be given a continuous glucose monitoring (CGM) device  The device is worn at all times  The CGM checks your blood sugar level every 5 minutes  It sends results to an electronic device such as a smart phone  A CGM can be used with or without an insulin pump  You and your diabetes care team providers will decide on the best method for you  The goal for blood sugar levels before meals  is between 80 and 130 mg/dL and 2 hours after eating  is lower than 180 mg/dL  Make healthy food choices  Work with a dietitian to develop a meal plan that works for you and your schedule  A dietitian can help you learn how to eat the right amount of carbohydrates during your meals and snacks  Carbohydrates can raise your blood sugar level if you eat too many at one time  Examples of foods that contain carbohydrates are breads, cereals, rice, pasta, and sweets  Eat high-fiber foods as directed  Fiber helps improve blood sugar levels  Fiber also lowers your risk for heart disease and other problems diabetes can cause  Examples of high-fiber foods include vegetables, whole-grain bread, and beans such as landa beans  Your dietitian can tell you how much fiber to have each day  Get regular physical activity  Physical activity can help you get to your target blood sugar level goal and manage your weight  Get at least 150 minutes of moderate to vigorous aerobic physical activity each week  Do not miss more than 2 days in a row  Do not sit longer than 30 minutes at a time  Your healthcare provider can help you create an activity plan   The plan can include the best activities for you and can help you build your strength and endurance  Maintain a healthy weight  Ask your team what a healthy weight is for you  A healthy weight can help you control diabetes and prevent heart disease  Ask your team to help you create a weight loss plan, if needed  Weight loss can help make a difference in managing diabetes  Your team will help you set a weight-loss goal, such as 10 to 15 pounds, or 5% of your extra weight  Together you and your team can set manageable weight loss goals  Take your diabetes medicine or insulin as directed  You may need diabetes medicine, insulin, or both to help control your blood sugar levels  Your healthcare provider will teach you how and when to take your diabetes medicine or insulin  You will also be taught about side effects oral diabetes medicine can cause  Insulin may be injected or given through a pump or pen  You and your providers will decide on the best method for you: An insulin pump  is an implanted device that gives your insulin 24 hours a day  An insulin pump prevents the need for multiple insulin injections in a day  An insulin pen  is a device prefilled with the right amount of insulin  You and your family members will be taught how to draw up and give insulin  if this is the best method for you  Your providers will also teach you how to dispose of needles and syringes  You will learn how much insulin you need  and when to give it  You will be taught when not to give insulin  You will also be taught what to do if your blood sugar level drops too low  This may happen if you take insulin and do not eat the right amount of carbohydrates  More ways to manage type 2 diabetes:   Wear medical alert identification  Wear medical alert jewelry or carry a card that says you have diabetes  Ask your provider where to get these items  Do not smoke  Nicotine and other chemicals in cigarettes and cigars can cause lung and blood vessel damage   It also makes it more difficult to manage your diabetes  Ask your provider for information if you currently smoke and need help to quit  Do not use e-cigarettes or smokeless tobacco in place of cigarettes or to help you quit  They still contain nicotine  Check your feet each day for cuts, scratches, calluses, or other wounds  Look for redness and swelling, and feel for warmth  Wear shoes that fit well  Check your shoes for rocks or other objects that can hurt your feet  Do not walk barefoot or wear shoes without socks  Wear cotton socks to help keep your feet dry  Ask about vaccines you may need  You have a higher risk for serious illness if you get the flu, pneumonia, COVID-19, or hepatitis  Ask your provider if you should get vaccines to prevent these or other diseases, and when to get the vaccines  Talk to your provider if you become stressed about diabetes care  Sometimes being able to fit diabetes care into your life can cause increased stress  The stress can cause you not to take care of yourself properly  Your care team providers can help by offering tips about self-care  Your providers may suggest you talk to a mental health provider who can listen and offer help with self-care issues  Have your A1c checked as directed  Your provider may check your A1c every 3 months, or 2 times each year if your diabetes is controlled  An A1c test shows the average amount of sugar in your blood over the past 2 to 3 months  Your provider will tell you what your A1c level should be  Have screening tests as directed  Your provider may recommend screening for complications of diabetes and other conditions that may develop  Some screenings may begin right away and some may happen within the first 5 years of diagnosis:    Examples of diabetes complications  include kidney problems, high cholesterol, high blood pressure, blood vessel problems, eye problems, and sleep apnea      You may be screened for a low vitamin B level  if you take oral diabetes medicine for a long time  Women of childbearing years may be screened  for polycystic ovarian syndrome (PCOS)  Follow up with your doctor or diabetes care team providers as directed: You may need to have blood tests done before your follow-up visit  The test results will show if changes need to be made in your treatment or self-care  Talk to your provider if you cannot afford your medicine  Write down your questions so you remember to ask them during your visits  © Copyright [employer] 2022 Information is for End User's use only and may not be sold, redistributed or otherwise used for commercial purposes  The above information is an  only  It is not intended as medical advice for individual conditions or treatments  Talk to your doctor, nurse or pharmacist before following any medical regimen to see if it is safe and effective for you      Pt is due for a physical

## 2023-06-05 NOTE — PROGRESS NOTES
Name: Karma Navarro      : 1965      MRN: [id number]  Encounter Provider: Mono Rosario MD  Encounter Date: 2023   Encounter department: [address] [place] [address]     1  Type 2 diabetes mellitus without complication, without long-term current use of insulin (Formerly [facility])  Assessment & Plan:    Lab Results   Component Value Date    HGBA1C 9 6 (H) 2023       COMPLIANT WITH MEDICATION  DENIES ANY NUMBNESS, TINGLING IN FEET    - DISCUSSED DIETARY MODIFICATION OF CARBOHYDRATES  - HGB A1C AND URINE STUDIES DUE TODAY  - FOOT CARE  - RV 3 MONTHS        Orders:  -     POCT hemoglobin A1c  -     Microalbumin, Random Urine (W/Creatinine) ([facility] ONLY); Future; Expected date: 2023  -     Hemoglobin A1c (w/out EAG) ([facility] ONLY); Future; Expected date: 2023  -     Albumin / creatinine urine ratio; Future; Expected date: 2023  -     Hemoglobin A1C; Future; Expected date: 2023    2  Essential hypertension  Assessment & Plan:  STABLE  DENIES ANY CP, SOB, PALPITATIONS, OR HEADACHE  NOTES NO WATER RETENTION  COMPLIANT WITH MEDICATION  NO CONCERNS    - CONTINUE CURRENT TREATMENT PLAN  - MONITOR DIETARY SODIUM INTAKE  - ENCOURAGE PHYSICAL ACTIVITY  - RV 3 MONTHS      Orders:  -     Comprehensive metabolic panel; Future; Expected date: 2023  -     Comprehensive metabolic panel    3  Mixed hyperlipidemia  Assessment & Plan:  WATCHING CHOLESTEROL INTAKE  COMPLIANT WITH MEDICATION  NO CONCERNS    - CONTINUE TO MONITOR DIETARY CHOL INTAKE  - CONTINUE CURRENT MEDICATION  - ENCOURAGED PHYSICAL ACTIVITY        Orders:  -     Lipid Panel with Direct LDL reflex; Future; Expected date: 2023  -     Lipid Panel with Direct LDL reflex    4   Gastroesophageal reflux disease with esophagitis, unspecified whether hemorrhage  Assessment & Plan:  STABLE  DENIES ANY CP, INDIGESTION, OR COUGH  NOTES NO MELENA OR HEMATOCHEZIA  NO HEMATEMESIS  COMPLIANT WITH MEDICATION  NO CONCERNS    - CONTINUE CURRENT TREATMENT PLAN  - MEDICATION AS PRESCRIBED  - AVOID CAFFEINE, ALCOHOL OR SMOKING      Orders:  -     CBC and differential; Future; Expected date: 09/05/2023  -     CBC and differential           Subjective      PATIENT RETURNS FOR ROUTINE EVALUATION OF PATIENT'S MEDICAL ISSUES    INDIVIDUAL MEDICAL ISSUES WITH THEIR CURRENT STATUS, ASSESSMENT AND PLANS ARE LISTED ABOVE        Review of Systems   Constitutional: Negative for chills, fatigue and fever  HENT: Negative for congestion, ear discharge, ear pain, mouth sores, postnasal drip, sore throat and trouble swallowing  Eyes: Negative for pain, discharge and visual disturbance  Respiratory: Negative for cough, shortness of breath and wheezing  Cardiovascular: Negative for chest pain, palpitations and leg swelling  Gastrointestinal: Negative for abdominal distention, abdominal pain, blood in stool, diarrhea and nausea  Endocrine: Negative for polydipsia, polyphagia and polyuria  Genitourinary: Negative for dysuria, frequency, hematuria and urgency  Musculoskeletal: Positive for arthralgias, back pain and gait problem  Negative for joint swelling  Skin: Negative for pallor and rash  Neurological: Negative for dizziness, syncope, speech difficulty, weakness, light-headedness, numbness and headaches  Hematological: Negative for adenopathy  Psychiatric/Behavioral: Negative for behavioral problems, confusion and sleep disturbance  The patient is not nervous/anxious          Current Outpatient Medications on File Prior to Visit   Medication Sig   • albuterol (2 5 mg/3 mL) 0 083 % nebulizer solution INHALE THE CONTENTS OF 1 VIAL VIA NEBULIZER EVERY 6 HOURS AS NEEDED FOR WHEEZING OR SHORTNESS OF BREATH   • albuterol (PROVENTIL HFA,VENTOLIN HFA) 90 mcg/act inhaler Inhale 2 puffs every 4 (four) hours as needed for wheezing   • ALPRAZolam (XANAX) 0 5 mg tablet    • amLODIPine (NORVASC) 10 mg tablet TAKE 1 "TABLET BY MOUTH EVERY DAY   • atorvastatin (LIPITOR) 10 mg tablet    • Cholecalciferol (VITAMIN D-3) 5000 units TABS Take 1 tablet by mouth daily   • Continuous Blood Gluc Sensor MISC Use Freestyle Calderon 3 Sensor   • cyclobenzaprine (FLEXERIL) 10 mg tablet Take 1 tablet (10 mg total) by mouth 3 (three) times a day as needed for muscle spasms BRAND ONLY PLEASE   • Farxiga 10 MG tablet TAKE 1 TABLET BY MOUTH EVERY DAY   • gabapentin (Neurontin) 300 mg capsule Take 1 capsule (300 mg total) by mouth 3 (three) times a day Take 1 capsule by mouth nightly for 3 days, then twice daily for 3 days, then 3 times daily thereafter   • glipiZIDE (GLUCOTROL XL) 10 mg 24 hr tablet Take 1 tablet (10 mg total) by mouth daily   • Mometasone Furoate (Asmanex HFA) 100 MCG/ACT AERO Inhale 1 puff (100 mcg total) 2 (two) times a day (Patient taking differently: Inhale 1 puff if needed)   • Mounjaro 15 MG/0 5ML    • pantoprazole (PROTONIX) 40 mg tablet Take 1 tablet by mouth daily   • rosuvastatin (CRESTOR) 5 mg tablet Take 1 tablet (5 mg total) by mouth daily   • [DISCONTINUED] clindamycin (CLEOCIN) 300 MG capsule  (Patient not taking: Reported on 6/5/2023)   • [DISCONTINUED] MICROLET LANCETS MISC Microlet Lancet   • [DISCONTINUED] Mometasone Furoate (Asmanex HFA) 100 MCG/ACT AERO daily (Patient not taking: Reported on 6/5/2023)   • [DISCONTINUED] predniSONE 20 mg tablet 2 PO QD X 4 DAYS, THEN 1 PO QD X 4 DAYS, THEN 1/2 PO QD X 4 DAYS       Objective     /76 (BP Location: Left arm, Patient Position: Sitting, Cuff Size: Large)   Pulse 80   Temp 97 6 °F (36 4 °C) (Temporal)   Resp 12   Ht 5' 6 5\" (1 689 m)   Wt 93 4 kg (206 lb)   SpO2 97%   BMI 32 75 kg/m²     Physical Exam  Constitutional:       General: He is not in acute distress  Appearance: Normal appearance  He is well-developed  He is not ill-appearing  HENT:      Head: Normocephalic and atraumatic  Eyes:      General:         Right eye: No discharge           Left " eye: No discharge  Conjunctiva/sclera: Conjunctivae normal       Pupils: Pupils are equal, round, and reactive to light  Neck:      Thyroid: No thyromegaly  Vascular: No JVD  Cardiovascular:      Rate and Rhythm: Normal rate and regular rhythm  Heart sounds: Normal heart sounds  No murmur heard  Pulmonary:      Effort: Pulmonary effort is normal       Breath sounds: Normal breath sounds  No wheezing or rales  Abdominal:      General: Bowel sounds are normal       Palpations: Abdomen is soft  There is no mass  Tenderness: There is no abdominal tenderness  There is no guarding or rebound  Musculoskeletal:         General: Tenderness present  No deformity  Cervical back: Neck supple  Comments: Mod djd changes     Lymphadenopathy:      Cervical: No cervical adenopathy  Skin:     General: Skin is warm and dry  Findings: No erythema or rash  Neurological:      General: No focal deficit present  Mental Status: He is alert and oriented to person, place, and time  Psychiatric:         Mood and Affect: Mood normal          Behavior: Behavior normal          Thought Content:  Thought content normal          Judgment: Judgment normal        Auburn Boeck, MD

## 2023-06-07 ENCOUNTER — TELEPHONE (OUTPATIENT)
Dept: FAMILY MEDICINE CLINIC | Facility: CLINIC | Age: 58
End: 2023-06-07

## 2023-06-07 NOTE — TELEPHONE ENCOUNTER
Melani Carbone left a message on the clinical voicemail line   Adamaris hayward, how are you? This is Georgina Nicholas, birthday 1965  I'm calling in reference to the MRI that I had on March 29th I'm going to see a chiropractor again today and they would like a copy of that MRI report sent to them  The fax number they want it sent to is [phone]  Again this is Georgina Nicholas  I'm going to see Doctor Edin Johnson and they want a copy of the MRI report that was collected on March 29th, 2023 and the fax number they wanted sent to is [phone]  If you have any questions you can call my cell phone  [phone] Thank you and have a nice day  Bye  Done and faxed MRI report to Parmjit Mcclelland as requested to fax# [phone]    No further action required

## 2023-09-20 ENCOUNTER — OFFICE VISIT (OUTPATIENT)
Dept: FAMILY MEDICINE CLINIC | Facility: CLINIC | Age: 58
End: 2023-09-20
Payer: COMMERCIAL

## 2023-09-20 VITALS
RESPIRATION RATE: 12 BRPM | OXYGEN SATURATION: 97 % | HEIGHT: 67 IN | WEIGHT: 213 LBS | HEART RATE: 90 BPM | DIASTOLIC BLOOD PRESSURE: 88 MMHG | SYSTOLIC BLOOD PRESSURE: 118 MMHG | TEMPERATURE: 94.7 F | BODY MASS INDEX: 33.43 KG/M2

## 2023-09-20 DIAGNOSIS — E11.9 TYPE 2 DIABETES MELLITUS WITHOUT COMPLICATION, WITHOUT LONG-TERM CURRENT USE OF INSULIN (HCC): Primary | ICD-10-CM

## 2023-09-20 DIAGNOSIS — E78.2 MIXED HYPERLIPIDEMIA: ICD-10-CM

## 2023-09-20 DIAGNOSIS — I10 ESSENTIAL HYPERTENSION: ICD-10-CM

## 2023-09-20 LAB
ALBUMIN SERPL-MCNC: 4.2 G/DL (ref 3.6–5.1)
ALBUMIN/CREAT UR: 40 MCG/MG CREAT
ALBUMIN/GLOB SERPL: 1.4 (CALC) (ref 1–2.5)
ALP SERPL-CCNC: 107 U/L (ref 35–144)
ALT SERPL-CCNC: 19 U/L (ref 9–46)
AST SERPL-CCNC: 15 U/L (ref 10–35)
BASOPHILS # BLD AUTO: 31 CELLS/UL (ref 0–200)
BASOPHILS NFR BLD AUTO: 0.8 %
BILIRUB SERPL-MCNC: 0.4 MG/DL (ref 0.2–1.2)
BUN SERPL-MCNC: 12 MG/DL (ref 7–25)
BUN/CREAT SERPL: ABNORMAL (CALC) (ref 6–22)
CALCIUM SERPL-MCNC: 9.4 MG/DL (ref 8.6–10.3)
CHLORIDE SERPL-SCNC: 107 MMOL/L (ref 98–110)
CHOLEST SERPL-MCNC: 127 MG/DL
CHOLEST/HDLC SERPL: 2.6 (CALC)
CO2 SERPL-SCNC: 26 MMOL/L (ref 20–32)
CREAT SERPL-MCNC: 0.81 MG/DL (ref 0.7–1.3)
CREAT UR-MCNC: 52 MG/DL (ref 20–320)
EOSINOPHIL # BLD AUTO: 140 CELLS/UL (ref 15–500)
EOSINOPHIL NFR BLD AUTO: 3.6 %
ERYTHROCYTE [DISTWIDTH] IN BLOOD BY AUTOMATED COUNT: 13.3 % (ref 11–15)
GFR/BSA.PRED SERPLBLD CYS-BASED-ARV: 102 ML/MIN/1.73M2
GLOBULIN SER CALC-MCNC: 3 G/DL (CALC) (ref 1.9–3.7)
GLUCOSE SERPL-MCNC: 164 MG/DL (ref 65–99)
HBA1C MFR BLD: 8.6 % OF TOTAL HGB
HCT VFR BLD AUTO: 51.8 % (ref 38.5–50)
HDLC SERPL-MCNC: 48 MG/DL
HGB BLD-MCNC: 17 G/DL (ref 13.2–17.1)
LDLC SERPL CALC-MCNC: 57 MG/DL (CALC)
LYMPHOCYTES # BLD AUTO: 1732 CELLS/UL (ref 850–3900)
LYMPHOCYTES NFR BLD AUTO: 44.4 %
MCH RBC QN AUTO: 27.3 PG (ref 27–33)
MCHC RBC AUTO-ENTMCNC: 32.8 G/DL (ref 32–36)
MCV RBC AUTO: 83.3 FL (ref 80–100)
MICROALBUMIN UR-MCNC: 2.1 MG/DL
MONOCYTES # BLD AUTO: 390 CELLS/UL (ref 200–950)
MONOCYTES NFR BLD AUTO: 10 %
NEUTROPHILS # BLD AUTO: 1607 CELLS/UL (ref 1500–7800)
NEUTROPHILS NFR BLD AUTO: 41.2 %
NONHDLC SERPL-MCNC: 79 MG/DL (CALC)
PLATELET # BLD AUTO: 291 THOUSAND/UL (ref 140–400)
PMV BLD REES-ECKER: 8.9 FL (ref 7.5–12.5)
POTASSIUM SERPL-SCNC: 4.2 MMOL/L (ref 3.5–5.3)
PROT SERPL-MCNC: 7.2 G/DL (ref 6.1–8.1)
RBC # BLD AUTO: 6.22 MILLION/UL (ref 4.2–5.8)
SODIUM SERPL-SCNC: 141 MMOL/L (ref 135–146)
TRIGL SERPL-MCNC: 140 MG/DL
WBC # BLD AUTO: 3.9 THOUSAND/UL (ref 3.8–10.8)

## 2023-09-20 PROCEDURE — 99214 OFFICE O/P EST MOD 30 MIN: CPT | Performed by: FAMILY MEDICINE

## 2023-09-20 RX ORDER — SODIUM FLUORIDE AND POTASSIUM NITRATE 5.8; 57.5 MG/ML; MG/ML
GEL, DENTIFRICE DENTAL
COMMUNITY
Start: 2023-08-30

## 2023-09-20 NOTE — LETTER
SLY      Current Outpatient Medications:     albuterol (2.5 mg/3 mL) 0.083 % nebulizer solution, INHALE THE CONTENTS OF 1 VIAL VIA NEBULIZER EVERY 6 HOURS AS NEEDED FOR WHEEZING OR SHORTNESS OF BREATH, Disp: 180 mL, Rfl: 0    albuterol (PROVENTIL HFA,VENTOLIN HFA) 90 mcg/act inhaler, Inhale 2 puffs every 4 (four) hours as needed for wheezing, Disp: 6.7 g, Rfl: 3    amLODIPine (NORVASC) 10 mg tablet, TAKE 1 TABLET BY MOUTH EVERY DAY, Disp: 90 tablet, Rfl: 3    Cholecalciferol (VITAMIN D-3) 5000 units TABS, Take 1 tablet by mouth daily, Disp: , Rfl:     Continuous Blood Gluc Sensor MISC, Use Freestyle Calderon 3 Sensor, Disp: , Rfl:     cyclobenzaprine (FLEXERIL) 10 mg tablet, Take 1 tablet (10 mg total) by mouth 3 (three) times a day as needed for muscle spasms BRAND ONLY PLEASE, Disp: 45 tablet, Rfl: 1    Farxiga 10 MG tablet, TAKE 1 TABLET BY MOUTH EVERY DAY, Disp: 90 tablet, Rfl: 3    glipiZIDE (GLUCOTROL XL) 10 mg 24 hr tablet, Take 1 tablet (10 mg total) by mouth daily, Disp: 180 tablet, Rfl: 1    Mometasone Furoate (Asmanex HFA) 100 MCG/ACT AERO, Inhale 1 puff (100 mcg total) 2 (two) times a day (Patient taking differently: Inhale 1 puff if needed), Disp: 13 g, Rfl: 3    Mounjaro 15 MG/0.5ML, , Disp: , Rfl:     pantoprazole (PROTONIX) 40 mg tablet, Take 1 tablet by mouth daily, Disp: , Rfl:     PreviDent 5000 Sensitive 1.1-5 % GEL, , Disp: , Rfl:     rosuvastatin (CRESTOR) 5 mg tablet, Take 1 tablet (5 mg total) by mouth daily, Disp: 90 tablet, Rfl: 3    atorvastatin (LIPITOR) 10 mg tablet, , Disp: , Rfl:       Recent Results (from the past 672 hour(s))   CBC and differential    Collection Time: 09/19/23  8:43 AM   Result Value Ref Range    White Blood Cell Count 3.9 3.8 - 10.8 Thousand/uL    Red Blood Cell Count 6.22 (H) 4.20 - 5.80 Million/uL    Hemoglobin 17.0 13.2 - 17.1 g/dL    HCT 51.8 (H) 38.5 - 50.0 %    MCV 83.3 80.0 - 100.0 fL    MCH 27.3 27.0 - 33.0 pg    MCHC 32.8 32.0 - 36.0 g/dL    RDW 13.3 11.0 - 15.0 %    Platelet Count 990 285 - 400 Thousand/uL    SL AMB MPV 8.9 7.5 - 12.5 fL    Neutrophils (Absolute) 1,607 1,500 - 7,800 cells/uL    Lymphocytes (Absolute) 1,732 850 - 3,900 cells/uL    Monocytes (Absolute) 390 200 - 950 cells/uL    Eosinophils (Absolute) 140 15 - 500 cells/uL    Basophils ABS 31 0 - 200 cells/uL    Neutrophils 41.2 %    Lymphocytes 44.4 %    Monocytes 10.0 %    Eosinophils 3.6 %    Basophils PCT 0.8 %   Comprehensive metabolic panel    Collection Time: 09/19/23  8:43 AM   Result Value Ref Range    Glucose, Random 164 (H) 65 - 99 mg/dL    BUN 12 7 - 25 mg/dL    Creatinine 0.81 0.70 - 1.30 mg/dL    eGFR 102 > OR = 60 mL/min/1.73m2    SL AMB BUN/CREATININE RATIO SEE NOTE: 6 - 22 (calc)    Sodium 141 135 - 146 mmol/L    Potassium 4.2 3.5 - 5.3 mmol/L    Chloride 107 98 - 110 mmol/L    CO2 26 20 - 32 mmol/L    Calcium 9.4 8.6 - 10.3 mg/dL    Protein, Total 7.2 6.1 - 8.1 g/dL    Albumin 4.2 3.6 - 5.1 g/dL    Globulin 3.0 1.9 - 3.7 g/dL (calc)    Albumin/Globulin Ratio 1.4 1.0 - 2.5 (calc)    TOTAL BILIRUBIN 0.4 0.2 - 1.2 mg/dL    Alkaline Phosphatase 107 35 - 144 U/L    AST 15 10 - 35 U/L    ALT 19 9 - 46 U/L   Lipid Panel with Direct LDL reflex    Collection Time: 09/19/23  8:43 AM   Result Value Ref Range    Total Cholesterol 127 <200 mg/dL    HDL 48 > OR = 40 mg/dL    Triglycerides 140 <150 mg/dL    LDL Calculated 57 mg/dL (calc)    Chol HDLC Ratio 2.6 <5.0 (calc)    Non-HDL Cholesterol 79 <130 mg/dL (calc)   Microalbumin, Random Urine (W/Creatinine)    Collection Time: 09/19/23  8:43 AM   Result Value Ref Range    Creatinine, Urine 52 20 - 320 mg/dL    Albumin,U,Random 2.1 See Note: mg/dL    Microalb/Creat Ratio 40 (H) <30 mcg/mg creat   Hemoglobin A1c (w/out EAG)    Collection Time: 09/19/23  8:43 AM   Result Value Ref Range    Hemoglobin A1C 8.6 (H) <5.7 % of total Hgb

## 2023-09-20 NOTE — PATIENT INSTRUCTIONS
CONTINUE CURRENT TREATMENT PLAN  MONITOR DIETARY SODIUM, CHOL, AND CARBOHYDRATE INTAKE  ENCOURAGE PHYSICAL ACTIVITY  FOOT CARE    RV 3 M, SOONER PRN      Recent Results (from the past 2688 hour(s))   Basic metabolic panel    Collection Time: 06/01/23  2:07 PM   Result Value Ref Range    Glucose, Random 108 (H) 65 - 99 mg/dL    BUN 14 7 - 25 mg/dL    Creatinine 0.80 0.70 - 1.30 mg/dL    eGFR 102 > OR = 60 mL/min/1.73m2    SL AMB BUN/CREATININE RATIO NOT APPLICABLE 6 - 22 (calc)    Sodium 138 135 - 146 mmol/L    Potassium 3.9 3.5 - 5.3 mmol/L    Chloride 104 98 - 110 mmol/L    CO2 26 20 - 32 mmol/L    Calcium 9.4 8.6 - 10.3 mg/dL   POCT hemoglobin A1c    Collection Time: 06/05/23  2:04 PM   Result Value Ref Range    Hemoglobin A1C 8.7 (A) 6.5

## 2023-09-20 NOTE — ASSESSMENT & PLAN NOTE
Lab Results   Component Value Date    HGBA1C 8.7 (A) 06/05/2023       COMPLIANT WITH MEDICATION  DENIES ANY NUMBNESS, TINGLING IN FEET    - DISCUSSED DIETARY MODIFICATION OF CARBOHYDRATES  - HGB A1C AND URINE STUDIES DUE TODAY  - FOOT CARE  - RV 3 MONTHS

## 2023-09-20 NOTE — LETTER
SLY      Current Outpatient Medications:     albuterol (2.5 mg/3 mL) 0.083 % nebulizer solution, INHALE THE CONTENTS OF 1 VIAL VIA NEBULIZER EVERY 6 HOURS AS NEEDED FOR WHEEZING OR SHORTNESS OF BREATH, Disp: 180 mL, Rfl: 0    albuterol (PROVENTIL HFA,VENTOLIN HFA) 90 mcg/act inhaler, Inhale 2 puffs every 4 (four) hours as needed for wheezing, Disp: 6.7 g, Rfl: 3    amLODIPine (NORVASC) 10 mg tablet, TAKE 1 TABLET BY MOUTH EVERY DAY, Disp: 90 tablet, Rfl: 3    atorvastatin (LIPITOR) 10 mg tablet, , Disp: , Rfl:     Cholecalciferol (VITAMIN D-3) 5000 units TABS, Take 1 tablet by mouth daily, Disp: , Rfl:     Continuous Blood Gluc Sensor MISC, Use Freestyle Calderon 3 Sensor, Disp: , Rfl:     cyclobenzaprine (FLEXERIL) 10 mg tablet, Take 1 tablet (10 mg total) by mouth 3 (three) times a day as needed for muscle spasms BRAND ONLY PLEASE, Disp: 45 tablet, Rfl: 1    Farxiga 10 MG tablet, TAKE 1 TABLET BY MOUTH EVERY DAY, Disp: 90 tablet, Rfl: 3    glipiZIDE (GLUCOTROL XL) 10 mg 24 hr tablet, Take 1 tablet (10 mg total) by mouth daily, Disp: 180 tablet, Rfl: 1    Mometasone Furoate (Asmanex HFA) 100 MCG/ACT AERO, Inhale 1 puff (100 mcg total) 2 (two) times a day (Patient taking differently: Inhale 1 puff if needed), Disp: 13 g, Rfl: 3    Mounjaro 15 MG/0.5ML, , Disp: , Rfl:     pantoprazole (PROTONIX) 40 mg tablet, Take 1 tablet by mouth daily, Disp: , Rfl:     rosuvastatin (CRESTOR) 5 mg tablet, Take 1 tablet (5 mg total) by mouth daily, Disp: 90 tablet, Rfl: 3      Recent Results (from the past 2688 hour(s))   Basic metabolic panel    Collection Time: 06/01/23  2:07 PM   Result Value Ref Range    Glucose, Random 108 (H) 65 - 99 mg/dL    BUN 14 7 - 25 mg/dL    Creatinine 0.80 0.70 - 1.30 mg/dL    eGFR 102 > OR = 60 mL/min/1.73m2    SL AMB BUN/CREATININE RATIO NOT APPLICABLE 6 - 22 (calc)    Sodium 138 135 - 146 mmol/L    Potassium 3.9 3.5 - 5.3 mmol/L    Chloride 104 98 - 110 mmol/L    CO2 26 20 - 32 mmol/L    Calcium 9.4 8.6 - 10.3 mg/dL   POCT hemoglobin A1c    Collection Time: 06/05/23  2:04 PM   Result Value Ref Range    Hemoglobin A1C 8.7 (A) 6.5

## 2023-09-20 NOTE — PROGRESS NOTES
Name: Saúl Bui      : 1965      MRN: [id number]  Encounter Provider: Sharita Newberry MD  Encounter Date: 2023   Encounter department: [facility]     1. Type 2 diabetes mellitus without complication, without long-term current use of insulin ([facility])  Assessment & Plan:    Lab Results   Component Value Date    HGBA1C 8.7 (A) 2023       COMPLIANT WITH MEDICATION  DENIES ANY NUMBNESS, TINGLING IN FEET    - DISCUSSED DIETARY MODIFICATION OF CARBOHYDRATES  - HGB A1C AND URINE STUDIES DUE TODAY  - FOOT CARE  - RV 3 MONTHS          2. Essential hypertension  Assessment & Plan:  STABLE  DENIES ANY CP, SOB, PALPITATIONS, OR HEADACHE  NOTES NO WATER RETENTION  COMPLIANT WITH MEDICATION  NO CONCERNS    - CONTINUE CURRENT TREATMENT PLAN  - MONITOR DIETARY SODIUM INTAKE  - ENCOURAGE PHYSICAL ACTIVITY  - RV 3 MONTHS        3. Mixed hyperlipidemia  Assessment & Plan:  WATCHING CHOLESTEROL INTAKE  COMPLIANT WITH MEDICATION  NO CONCERNS    - CONTINUE TO MONITOR DIETARY CHOL INTAKE  - CONTINUE CURRENT MEDICATION  - ENCOURAGED PHYSICAL ACTIVITY                 Subjective      PATIENT RETURNS FOR ROUTINE EVALUATION OF PATIENT'S MEDICAL ISSUES    INDIVIDUAL MEDICAL ISSUES WITH THEIR CURRENT STATUS, ASSESSMENT AND PLANS ARE LISTED ABOVE        Review of Systems   Constitutional: Negative for chills, fatigue and fever. HENT: Negative for congestion, ear discharge, ear pain, mouth sores, postnasal drip, sore throat and trouble swallowing. Eyes: Negative for pain, discharge and visual disturbance. Respiratory: Negative for cough, shortness of breath and wheezing. Cardiovascular: Negative for chest pain, palpitations and leg swelling. Gastrointestinal: Negative for abdominal distention, abdominal pain, blood in stool, diarrhea and nausea. Endocrine: Negative for polydipsia, polyphagia and polyuria.    Genitourinary: Negative for dysuria, frequency, hematuria and urgency. Musculoskeletal: Negative for arthralgias, gait problem and joint swelling. Skin: Negative for pallor and rash. Neurological: Negative for dizziness, syncope, speech difficulty, weakness, light-headedness, numbness and headaches. Hematological: Negative for adenopathy. Psychiatric/Behavioral: Negative for behavioral problems, confusion and sleep disturbance. The patient is not nervous/anxious.         Current Outpatient Medications on File Prior to Visit   Medication Sig   • albuterol (2.5 mg/3 mL) 0.083 % nebulizer solution INHALE THE CONTENTS OF 1 VIAL VIA NEBULIZER EVERY 6 HOURS AS NEEDED FOR WHEEZING OR SHORTNESS OF BREATH   • albuterol (PROVENTIL HFA,VENTOLIN HFA) 90 mcg/act inhaler Inhale 2 puffs every 4 (four) hours as needed for wheezing   • amLODIPine (NORVASC) 10 mg tablet TAKE 1 TABLET BY MOUTH EVERY DAY   • Cholecalciferol (VITAMIN D-3) 5000 units TABS Take 1 tablet by mouth daily   • Continuous Blood Gluc Sensor MISC Use Freestyle Calderon 3 Sensor   • cyclobenzaprine (FLEXERIL) 10 mg tablet Take 1 tablet (10 mg total) by mouth 3 (three) times a day as needed for muscle spasms BRAND ONLY PLEASE   • Farxiga 10 MG tablet TAKE 1 TABLET BY MOUTH EVERY DAY   • glipiZIDE (GLUCOTROL XL) 10 mg 24 hr tablet Take 1 tablet (10 mg total) by mouth daily   • Mometasone Furoate (Asmanex HFA) 100 MCG/ACT AERO Inhale 1 puff (100 mcg total) 2 (two) times a day (Patient taking differently: Inhale 1 puff if needed)   • Mounjaro 15 MG/0.5ML    • pantoprazole (PROTONIX) 40 mg tablet Take 1 tablet by mouth daily   • PreviDent 5000 Sensitive 1.1-5 % GEL    • rosuvastatin (CRESTOR) 5 mg tablet Take 1 tablet (5 mg total) by mouth daily   • atorvastatin (LIPITOR) 10 mg tablet  (Patient not taking: Reported on 9/20/2023)       Objective     /88 (BP Location: Right arm, Patient Position: Sitting, Cuff Size: Large)   Pulse 90   Temp (!) 94.7 °F (34.8 °C) (Temporal)   Resp 12   Ht 5' 6.5" (1.689 m)   Wt 96.6 kg (213 lb)   SpO2 97%   BMI 33.86 kg/m²     Physical Exam  Constitutional:       General: He is not in acute distress. Appearance: Normal appearance. He is well-developed. He is not ill-appearing. HENT:      Head: Normocephalic and atraumatic. Eyes:      General:         Right eye: No discharge. Left eye: No discharge. Conjunctiva/sclera: Conjunctivae normal.      Pupils: Pupils are equal, round, and reactive to light. Neck:      Thyroid: No thyromegaly. Vascular: No JVD. Cardiovascular:      Rate and Rhythm: Normal rate and regular rhythm. Heart sounds: Normal heart sounds. No murmur heard. Pulmonary:      Effort: Pulmonary effort is normal.      Breath sounds: Normal breath sounds. No wheezing or rales. Abdominal:      General: Bowel sounds are normal.      Palpations: Abdomen is soft. There is no mass. Tenderness: There is no abdominal tenderness. There is no guarding or rebound. Musculoskeletal:         General: No tenderness or deformity. Normal range of motion. Cervical back: Neck supple. Lymphadenopathy:      Cervical: No cervical adenopathy. Skin:     General: Skin is warm and dry. Findings: No erythema or rash. Neurological:      General: No focal deficit present. Mental Status: He is alert and oriented to person, place, and time. Psychiatric:         Mood and Affect: Mood normal.         Behavior: Behavior normal.         Thought Content:  Thought content normal.         Judgment: Judgment normal.       Madi Bro MD

## 2023-10-23 ENCOUNTER — IMMUNIZATIONS (OUTPATIENT)
Dept: FAMILY MEDICINE CLINIC | Facility: CLINIC | Age: 58
End: 2023-10-23
Payer: COMMERCIAL

## 2023-10-23 DIAGNOSIS — Z23 ENCOUNTER FOR IMMUNIZATION: Primary | ICD-10-CM

## 2023-10-23 PROCEDURE — 90471 IMMUNIZATION ADMIN: CPT

## 2023-10-23 PROCEDURE — 90686 IIV4 VACC NO PRSV 0.5 ML IM: CPT

## 2023-11-03 DIAGNOSIS — E78.2 MIXED HYPERLIPIDEMIA: ICD-10-CM

## 2023-11-03 RX ORDER — ROSUVASTATIN CALCIUM 5 MG/1
5 TABLET, COATED ORAL DAILY
Qty: 90 TABLET | Refills: 1 | Status: SHIPPED | OUTPATIENT
Start: 2023-11-03

## 2023-11-27 LAB
LEFT EYE DIABETIC RETINOPATHY: NORMAL
RIGHT EYE DIABETIC RETINOPATHY: NORMAL

## 2023-11-29 DIAGNOSIS — E11.9 TYPE 2 DIABETES MELLITUS WITHOUT COMPLICATION, WITHOUT LONG-TERM CURRENT USE OF INSULIN (HCC): ICD-10-CM

## 2023-11-29 DIAGNOSIS — I10 ESSENTIAL HYPERTENSION: Primary | ICD-10-CM

## 2023-12-11 DIAGNOSIS — I10 ESSENTIAL HYPERTENSION: ICD-10-CM

## 2023-12-11 RX ORDER — AMLODIPINE BESYLATE 10 MG/1
TABLET ORAL
Qty: 90 TABLET | Refills: 1 | Status: SHIPPED | OUTPATIENT
Start: 2023-12-11

## 2023-12-20 ENCOUNTER — TELEPHONE (OUTPATIENT)
Age: 58
End: 2023-12-20

## 2023-12-20 NOTE — TELEPHONE ENCOUNTER
Patient called and states is at [facility] and forgot BW orders, needs faxed but will need to call back with fax number.

## 2023-12-21 LAB
BUN SERPL-MCNC: 13 MG/DL (ref 7–25)
BUN/CREAT SERPL: ABNORMAL (CALC) (ref 6–22)
CALCIUM SERPL-MCNC: 9.7 MG/DL (ref 8.6–10.3)
CHLORIDE SERPL-SCNC: 100 MMOL/L (ref 98–110)
CO2 SERPL-SCNC: 28 MMOL/L (ref 20–32)
CREAT SERPL-MCNC: 0.98 MG/DL (ref 0.7–1.3)
GFR/BSA.PRED SERPLBLD CYS-BASED-ARV: 89 ML/MIN/1.73M2
GLUCOSE SERPL-MCNC: 223 MG/DL (ref 65–99)
HBA1C MFR BLD: 10.4 % OF TOTAL HGB
POTASSIUM SERPL-SCNC: 4.2 MMOL/L (ref 3.5–5.3)
SODIUM SERPL-SCNC: 138 MMOL/L (ref 135–146)

## 2023-12-27 ENCOUNTER — OFFICE VISIT (OUTPATIENT)
Dept: FAMILY MEDICINE CLINIC | Facility: CLINIC | Age: 58
End: 2023-12-27
Payer: COMMERCIAL

## 2023-12-27 ENCOUNTER — TELEPHONE (OUTPATIENT)
Dept: ADMINISTRATIVE | Facility: OTHER | Age: 58
End: 2023-12-27

## 2023-12-27 VITALS
OXYGEN SATURATION: 96 % | BODY MASS INDEX: 33.59 KG/M2 | DIASTOLIC BLOOD PRESSURE: 88 MMHG | HEART RATE: 99 BPM | SYSTOLIC BLOOD PRESSURE: 110 MMHG | TEMPERATURE: 97.8 F | RESPIRATION RATE: 14 BRPM | WEIGHT: 214 LBS | HEIGHT: 67 IN

## 2023-12-27 DIAGNOSIS — I10 ESSENTIAL HYPERTENSION: ICD-10-CM

## 2023-12-27 DIAGNOSIS — E78.2 MIXED HYPERLIPIDEMIA: ICD-10-CM

## 2023-12-27 DIAGNOSIS — K21.00 GASTROESOPHAGEAL REFLUX DISEASE WITH ESOPHAGITIS, UNSPECIFIED WHETHER HEMORRHAGE: ICD-10-CM

## 2023-12-27 DIAGNOSIS — E11.9 TYPE 2 DIABETES MELLITUS WITHOUT COMPLICATION, WITHOUT LONG-TERM CURRENT USE OF INSULIN (HCC): Primary | ICD-10-CM

## 2023-12-27 PROCEDURE — 99214 OFFICE O/P EST MOD 30 MIN: CPT | Performed by: FAMILY MEDICINE

## 2023-12-27 RX ORDER — METHYLPREDNISOLONE 4 MG/1
4 TABLET ORAL
COMMUNITY
Start: 2023-09-30 | End: 2023-12-27 | Stop reason: ALTCHOICE

## 2023-12-27 RX ORDER — TIRZEPATIDE 10 MG/.5ML
10 INJECTION, SOLUTION SUBCUTANEOUS
COMMUNITY
Start: 2023-11-27

## 2023-12-27 NOTE — LETTER
YENY BLAKELYOCK      Current Outpatient Medications:     methylPREDNISolone 4 MG tablet therapy pack, Take 4 mg by mouth, Disp: , Rfl:     Mounjaro 10 MG/0.5ML, Inject 10 mg under the skin every 7 days, Disp: , Rfl:     albuterol (2.5 mg/3 mL) 0.083 % nebulizer solution, INHALE THE CONTENTS OF 1 VIAL VIA NEBULIZER EVERY 6 HOURS AS NEEDED FOR WHEEZING OR SHORTNESS OF BREATH, Disp: 180 mL, Rfl: 0    albuterol (PROVENTIL HFA,VENTOLIN HFA) 90 mcg/act inhaler, Inhale 2 puffs every 4 (four) hours as needed for wheezing, Disp: 6.7 g, Rfl: 3    amLODIPine (NORVASC) 10 mg tablet, TAKE 1 TABLET BY MOUTH EVERY DAY, Disp: 90 tablet, Rfl: 1    Cholecalciferol (VITAMIN D-3) 5000 units TABS, Take 1 tablet by mouth daily, Disp: , Rfl:     Continuous Blood Gluc Sensor MISC, Use Freestyle Calderon 3 Sensor, Disp: , Rfl:     cyclobenzaprine (FLEXERIL) 10 mg tablet, Take 1 tablet (10 mg total) by mouth 3 (three) times a day as needed for muscle spasms BRAND ONLY PLEASE, Disp: 45 tablet, Rfl: 1    Farxiga 10 MG tablet, TAKE 1 TABLET BY MOUTH EVERY DAY, Disp: 90 tablet, Rfl: 3    glipiZIDE (GLUCOTROL XL) 10 mg 24 hr tablet, Take 1 tablet (10 mg total) by mouth daily, Disp: 180 tablet, Rfl: 1    Mometasone Furoate (Asmanex HFA) 100 MCG/ACT AERO, Inhale 1 puff (100 mcg total) 2 (two) times a day (Patient taking differently: Inhale 1 puff if needed), Disp: 13 g, Rfl: 3    Mounjaro 15 MG/0.5ML, , Disp: , Rfl:     pantoprazole (PROTONIX) 40 mg tablet, Take 1 tablet by mouth daily, Disp: , Rfl:     PreviDent 5000 Sensitive 1.1-5 % GEL, , Disp: , Rfl:     rosuvastatin (CRESTOR) 5 mg tablet, TAKE 1 TABLET DAILY, Disp: 90 tablet, Rfl: 1      Recent Results (from the past 672 hour(s))   Basic metabolic panel    Collection Time: 12/20/23  9:29 AM   Result Value Ref Range    Glucose, Random 223 (H) 65 - 99 mg/dL    BUN 13 7 - 25 mg/dL    Creatinine 0.98 0.70 - 1.30 mg/dL    eGFR 89 > OR = 60 mL/min/1.73m2    SL AMB BUN/CREATININE RATIO SEE NOTE: 6  - 22 (calc)    Sodium 138 135 - 146 mmol/L    Potassium 4.2 3.5 - 5.3 mmol/L    Chloride 100 98 - 110 mmol/L    CO2 28 20 - 32 mmol/L    Calcium 9.7 8.6 - 10.3 mg/dL   Hemoglobin A1c (w/out EAG)    Collection Time: 12/20/23  9:29 AM   Result Value Ref Range    Hemoglobin A1C 10.4 (H) <5.7 % of total Hgb

## 2023-12-27 NOTE — TELEPHONE ENCOUNTER
----- Message from Leo Rosario sent at 12/27/2023 10:07 AM EST -----  Regarding: CARE GAP REQUST - DM EYE  12/27/23 10:07 AM    Hello, our patient attached above has had Diabetic Eye Exam completed/performed. Please assist in updating the patient chart by making an External outreach to [facility] located in [place], [place]. The date of service is Fall 2023.    Thank you,  Leo Rosario, [facility]  [facility]

## 2023-12-27 NOTE — ASSESSMENT & PLAN NOTE
WATCHING CHOLESTEROL INTAKE  COMPLIANT WITH MEDICATION  NO CONCERNS    - CONTINUE TO MONITOR DIETARY CHOL INTAKE  - CONTINUE CURRENT MEDICATION  - ENCOURAGED PHYSICAL ACTIVITY

## 2023-12-27 NOTE — LETTER
Diabetic Eye Exam Form    Date Requested: 23  Patient: Bijan Holland  Patient : 1965   Referring Provider: Galo Paredes MD      DIABETIC Eye Exam Date _______________________________      Type of Exam MUST be documented for Diabetic Eye Exams. Please CHECK ONE.     Retinal Exam       Dilated Retinal Exam       OCT       Optomap-Iris Exam      Fundus Photography       Left Eye - Please check Retinopathy or No Retinopathy        Exam did show retinopathy    Exam did not show retinopathy       Right Eye - Please check Retinopathy or No Retinopathy       Exam did show retinopathy    Exam did not show retinopathy       Comments __________________________________________________________    Practice Providing Exam ______________________________________________    Exam Performed By (print name) _______________________________________      Provider Signature ___________________________________________________      These reports are needed for  compliance.  Please fax this completed form and a copy of the Diabetic Eye Exam report to our office located at [address] [place] [address] as soon as possible via Fax [phone] attention Dayan: Phone [phone]  We thank you for your assistance in treating our mutual patient.

## 2023-12-27 NOTE — PROGRESS NOTES
Name: Bijan Holland      : 1965      MRN: [id number]  Encounter Provider: Galo Paredes MD  Encounter Date: 2023   Encounter department: [facility]    Assessment & Plan     1. Type 2 diabetes mellitus without complication, without long-term current use of insulin ([facility])  Assessment & Plan:    Lab Results   Component Value Date    HGBA1C 10.4 (H) 2023     COMPLIANT WITH MEDICATION  DENIES ANY NUMBNESS, TINGLING IN FEET    - DISCUSSED DIETARY MODIFICATION OF CARBOHYDRATES  - HGB A1C AND URINE STUDIES DUE TODAY  - FOOT CARE  - RV 3 MONTHS        2. Essential hypertension  Assessment & Plan:  STABLE  DENIES ANY CP, SOB, PALPITATIONS, OR HEADACHE  NOTES NO WATER RETENTION  COMPLIANT WITH MEDICATION  NO CONCERNS    - CONTINUE CURRENT TREATMENT PLAN  - MONITOR DIETARY SODIUM INTAKE  - ENCOURAGE PHYSICAL ACTIVITY  - RV 3 MONTHS        3. Mixed hyperlipidemia  Assessment & Plan:  WATCHING CHOLESTEROL INTAKE  COMPLIANT WITH MEDICATION  NO CONCERNS    - CONTINUE TO MONITOR DIETARY CHOL INTAKE  - CONTINUE CURRENT MEDICATION  - ENCOURAGED PHYSICAL ACTIVITY        4. Gastroesophageal reflux disease with esophagitis, unspecified whether hemorrhage  Assessment & Plan:  STABLE  DENIES ANY CP, INDIGESTION, OR COUGH  NOTES NO MELENA OR HEMATOCHEZIA  NO HEMATEMESIS  COMPLIANT WITH MEDICATION  NO CONCERNS    - CONTINUE CURRENT TREATMENT PLAN  - MEDICATION AS PRESCRIBED  - AVOID CAFFEINE, ALCOHOL OR SMOKING               Subjective      PATIENT RETURNS FOR ROUTINE EVALUATION OF PATIENT'S MEDICAL ISSUES    INDIVIDUAL MEDICAL ISSUES WITH THEIR CURRENT STATUS, ASSESSMENT AND PLANS ARE LISTED ABOVE            Review of Systems   Constitutional:  Negative for chills, fatigue and fever.   HENT:  Negative for congestion, ear discharge, ear pain, mouth sores, postnasal drip, sore throat and trouble swallowing.    Eyes:  Negative for pain, discharge and visual disturbance.   Respiratory:   Negative for cough, shortness of breath and wheezing.    Cardiovascular:  Negative for chest pain, palpitations and leg swelling.   Gastrointestinal:  Negative for abdominal distention, abdominal pain, blood in stool, diarrhea and nausea.   Endocrine: Negative for polydipsia, polyphagia and polyuria.   Genitourinary:  Negative for dysuria, frequency, hematuria and urgency.   Musculoskeletal:  Negative for arthralgias, gait problem and joint swelling.   Skin:  Negative for pallor and rash.   Neurological:  Negative for dizziness, syncope, speech difficulty, weakness, light-headedness, numbness and headaches.   Hematological:  Negative for adenopathy.   Psychiatric/Behavioral:  Negative for behavioral problems, confusion and sleep disturbance. The patient is not nervous/anxious.        Current Outpatient Medications on File Prior to Visit   Medication Sig   • albuterol (2.5 mg/3 mL) 0.083 % nebulizer solution INHALE THE CONTENTS OF 1 VIAL VIA NEBULIZER EVERY 6 HOURS AS NEEDED FOR WHEEZING OR SHORTNESS OF BREATH   • albuterol (PROVENTIL HFA,VENTOLIN HFA) 90 mcg/act inhaler Inhale 2 puffs every 4 (four) hours as needed for wheezing   • amLODIPine (NORVASC) 10 mg tablet TAKE 1 TABLET BY MOUTH EVERY DAY   • Cholecalciferol (VITAMIN D-3) 5000 units TABS Take 1 tablet by mouth daily   • Continuous Blood Gluc Sensor MISC Use Freestyle Calderon 3 Sensor   • cyclobenzaprine (FLEXERIL) 10 mg tablet Take 1 tablet (10 mg total) by mouth 3 (three) times a day as needed for muscle spasms BRAND ONLY PLEASE   • Farxiga 10 MG tablet TAKE 1 TABLET BY MOUTH EVERY DAY   • glipiZIDE (GLUCOTROL XL) 10 mg 24 hr tablet Take 1 tablet (10 mg total) by mouth daily   • Mometasone Furoate (Asmanex HFA) 100 MCG/ACT AERO Inhale 1 puff (100 mcg total) 2 (two) times a day (Patient taking differently: Inhale 1 puff if needed)   • Mounjaro 10 MG/0.5ML Inject 10 mg under the skin every 7 days   • pantoprazole (PROTONIX) 40 mg tablet Take 1 tablet by mouth  "daily   • rosuvastatin (CRESTOR) 5 mg tablet TAKE 1 TABLET DAILY   • [DISCONTINUED] methylPREDNISolone 4 MG tablet therapy pack Take 4 mg by mouth   • [DISCONTINUED] PreviDent 5000 Sensitive 1.1-5 % GEL    • [DISCONTINUED] atorvastatin (LIPITOR) 10 mg tablet  (Patient not taking: Reported on 9/20/2023)   • [DISCONTINUED] Mounjaro 15 MG/0.5ML  (Patient not taking: Reported on 12/27/2023)       Objective     /88 (BP Location: Left arm, Patient Position: Sitting, Cuff Size: Large)   Pulse 99   Temp 97.8 °F (36.6 °C) (Temporal)   Resp 14   Ht 5' 6.5\" (1.689 m)   Wt 97.1 kg (214 lb)   SpO2 96%   BMI 34.02 kg/m²     Physical Exam  Vitals reviewed.   Constitutional:       General: He is not in acute distress.     Appearance: Normal appearance. He is well-developed. He is obese. He is not ill-appearing.   HENT:      Head: Normocephalic and atraumatic.   Eyes:      General:         Right eye: No discharge.         Left eye: No discharge.      Conjunctiva/sclera: Conjunctivae normal.      Pupils: Pupils are equal, round, and reactive to light.   Neck:      Thyroid: No thyromegaly.      Vascular: No JVD.   Cardiovascular:      Rate and Rhythm: Normal rate and regular rhythm.      Heart sounds: Normal heart sounds. No murmur heard.  Pulmonary:      Effort: Pulmonary effort is normal.      Breath sounds: Normal breath sounds. No wheezing or rales.   Abdominal:      General: Bowel sounds are normal.      Palpations: Abdomen is soft. There is no mass.      Tenderness: There is no abdominal tenderness. There is no guarding or rebound.   Musculoskeletal:         General: No tenderness or deformity. Normal range of motion.      Cervical back: Neck supple.   Lymphadenopathy:      Cervical: No cervical adenopathy.   Skin:     General: Skin is warm and dry.      Findings: No erythema or rash.   Neurological:      General: No focal deficit present.      Mental Status: He is alert and oriented to person, place, and time. "   Psychiatric:         Mood and Affect: Mood normal.         Behavior: Behavior normal.         Thought Content: Thought content normal.         Judgment: Judgment normal.       Galo Paredes MD

## 2023-12-28 NOTE — TELEPHONE ENCOUNTER
Upon review of the In Basket request and the patient's chart, initial outreach has been made via fax to facility. Please see Contacts section for details.     Thank you  Dayan Cox MA

## 2024-01-02 NOTE — TELEPHONE ENCOUNTER
Upon review of the In Basket request we were able to locate, review, and update the patient chart as requested for Diabetic Eye Exam.    Any additional questions or concerns should be emailed to the Practice Liaisons via the appropriate education email address, please do not reply via In Basket.    Thank you  Dayan Cox MA

## 2024-01-09 DIAGNOSIS — J45.20 MILD INTERMITTENT ASTHMA WITHOUT COMPLICATION: ICD-10-CM

## 2024-01-09 RX ORDER — ALBUTEROL SULFATE 90 UG/1
2 AEROSOL, METERED RESPIRATORY (INHALATION) EVERY 4 HOURS PRN
Qty: 6.7 G | Refills: 3 | Status: SHIPPED | OUTPATIENT
Start: 2024-01-09

## 2024-01-09 NOTE — TELEPHONE ENCOUNTER
Patient called requesting a refill on Albuterol inhaler. Would like sent to [facility] in [facility]. Questions can be reached at [phone]

## 2024-02-07 DIAGNOSIS — E11.9 TYPE 2 DIABETES MELLITUS WITHOUT COMPLICATION, WITHOUT LONG-TERM CURRENT USE OF INSULIN (HCC): ICD-10-CM

## 2024-02-07 RX ORDER — GLIPIZIDE 10 MG/1
10 TABLET, FILM COATED, EXTENDED RELEASE ORAL DAILY
Qty: 90 TABLET | Refills: 1 | Status: SHIPPED | OUTPATIENT
Start: 2024-02-07

## 2024-02-26 DIAGNOSIS — I10 ESSENTIAL HYPERTENSION: ICD-10-CM

## 2024-02-26 DIAGNOSIS — E11.9 TYPE 2 DIABETES MELLITUS WITHOUT COMPLICATION, WITHOUT LONG-TERM CURRENT USE OF INSULIN (HCC): Primary | ICD-10-CM

## 2024-03-16 LAB
BUN SERPL-MCNC: 14 MG/DL (ref 7–25)
BUN/CREAT SERPL: ABNORMAL (CALC) (ref 6–22)
CALCIUM SERPL-MCNC: 9.9 MG/DL (ref 8.6–10.3)
CHLORIDE SERPL-SCNC: 104 MMOL/L (ref 98–110)
CO2 SERPL-SCNC: 28 MMOL/L (ref 20–32)
CREAT SERPL-MCNC: 0.91 MG/DL (ref 0.7–1.35)
GFR/BSA.PRED SERPLBLD CYS-BASED-ARV: 96 ML/MIN/1.73M2
GLUCOSE SERPL-MCNC: 162 MG/DL (ref 65–99)
HBA1C MFR BLD: 10.4 % OF TOTAL HGB
POTASSIUM SERPL-SCNC: 4.5 MMOL/L (ref 3.5–5.3)
SODIUM SERPL-SCNC: 139 MMOL/L (ref 135–146)

## 2024-03-26 RX ORDER — SODIUM FLUORIDE AND POTASSIUM NITRATE 5.8; 57.5 MG/ML; MG/ML
GEL, DENTIFRICE DENTAL
COMMUNITY
End: 2024-03-27 | Stop reason: ALTCHOICE

## 2024-03-26 RX ORDER — OXYCODONE AND ACETAMINOPHEN 7.5; 325 MG/1; MG/1
TABLET ORAL
COMMUNITY
End: 2024-03-27 | Stop reason: ALTCHOICE

## 2024-03-26 RX ORDER — CHLORHEXIDINE GLUCONATE ORAL RINSE 1.2 MG/ML
SOLUTION DENTAL
COMMUNITY
End: 2024-03-27 | Stop reason: ALTCHOICE

## 2024-03-26 RX ORDER — PANTOPRAZOLE SODIUM 20 MG/1
20 TABLET, DELAYED RELEASE ORAL DAILY
COMMUNITY
Start: 2024-01-02 | End: 2024-03-27 | Stop reason: DRUGHIGH

## 2024-03-26 RX ORDER — PIOGLITAZONEHYDROCHLORIDE 30 MG/1
30 TABLET ORAL DAILY
COMMUNITY
Start: 2024-01-24

## 2024-03-27 ENCOUNTER — OFFICE VISIT (OUTPATIENT)
Dept: FAMILY MEDICINE CLINIC | Facility: CLINIC | Age: 59
End: 2024-03-27
Payer: COMMERCIAL

## 2024-03-27 VITALS
OXYGEN SATURATION: 97 % | HEIGHT: 67 IN | WEIGHT: 217 LBS | SYSTOLIC BLOOD PRESSURE: 122 MMHG | DIASTOLIC BLOOD PRESSURE: 88 MMHG | TEMPERATURE: 97.5 F | BODY MASS INDEX: 34.06 KG/M2 | RESPIRATION RATE: 14 BRPM | HEART RATE: 94 BPM

## 2024-03-27 DIAGNOSIS — E78.2 MIXED HYPERLIPIDEMIA: ICD-10-CM

## 2024-03-27 DIAGNOSIS — E11.65 TYPE 2 DIABETES MELLITUS WITH HYPERGLYCEMIA, WITHOUT LONG-TERM CURRENT USE OF INSULIN (HCC): ICD-10-CM

## 2024-03-27 DIAGNOSIS — J45.20 MILD INTERMITTENT ASTHMA WITHOUT COMPLICATION: ICD-10-CM

## 2024-03-27 DIAGNOSIS — E11.9 TYPE 2 DIABETES MELLITUS WITHOUT COMPLICATION, WITHOUT LONG-TERM CURRENT USE OF INSULIN (HCC): Primary | ICD-10-CM

## 2024-03-27 DIAGNOSIS — I10 ESSENTIAL HYPERTENSION: ICD-10-CM

## 2024-03-27 PROCEDURE — 99214 OFFICE O/P EST MOD 30 MIN: CPT | Performed by: FAMILY MEDICINE

## 2024-03-27 RX ORDER — TIRZEPATIDE 15 MG/.5ML
15 INJECTION, SOLUTION SUBCUTANEOUS
COMMUNITY
Start: 2024-03-06

## 2024-03-27 NOTE — PROGRESS NOTES
Name: Bijan Holland      : 1965      MRN: [id number]  Encounter Provider: Galo Paredes MD  Encounter Date: 3/27/2024   Encounter department: [facility]    Assessment & Plan     1. Type 2 diabetes mellitus without complication, without long-term current use of insulin (HCC)  Assessment & Plan:    Lab Results   Component Value Date    HGBA1C 10.4 (H) 03/15/2024       COMPLIANT WITH MEDICATION  DENIES ANY NUMBNESS, TINGLING IN FEET    - DISCUSSED DIETARY MODIFICATION OF CARBOHYDRATES  - HGB A1C AND URINE STUDIES DUE TODAY  - FOOT CARE  - RV 3 MONTHS      Orders:  -     Hemoglobin A1C; Future; Expected date: 2024  -     Comprehensive metabolic panel; Future; Expected date: 2024  -     Lipid Panel with Direct LDL reflex; Future; Expected date: 2024  -     Comprehensive metabolic panel  -     Lipid Panel with Direct LDL reflex    2. Essential hypertension  Assessment & Plan:  STABLE  DENIES ANY CP, SOB, PALPITATIONS, OR HEADACHE  NOTES NO WATER RETENTION  COMPLIANT WITH MEDICATION  NO CONCERNS    - CONTINUE CURRENT TREATMENT PLAN  - MONITOR DIETARY SODIUM INTAKE  - ENCOURAGE PHYSICAL ACTIVITY  - RV 3 MONTHS        3. Mixed hyperlipidemia  Assessment & Plan:  WATCHING CHOLESTEROL INTAKE  COMPLIANT WITH MEDICATION  NO CONCERNS    - CONTINUE TO MONITOR DIETARY CHOL INTAKE  - CONTINUE CURRENT MEDICATION  - ENCOURAGED PHYSICAL ACTIVITY        4. Mild intermittent asthma without complication  Assessment & Plan:  STABLE      5. Type 2 diabetes mellitus with hyperglycemia, without long-term current use of insulin (HCC)        Depression Screening and Follow-up Plan: Patient was screened for depression during today's encounter. They screened negative with a PHQ-2 score of 0.        Subjective      PATIENT RETURNS FOR ROUTINE EVALUATION OF PATIENT'S MEDICAL ISSUES    INDIVIDUAL MEDICAL ISSUES WITH THEIR CURRENT STATUS, ASSESSMENT AND PLANS ARE LISTED  ABOVE            Review of Systems   Constitutional:  Negative for chills, fatigue and fever.   HENT:  Negative for congestion, ear discharge, ear pain, mouth sores, postnasal drip, sore throat and trouble swallowing.    Eyes:  Negative for pain, discharge and visual disturbance.   Respiratory:  Negative for cough, shortness of breath and wheezing.    Cardiovascular:  Negative for chest pain, palpitations and leg swelling.   Gastrointestinal:  Negative for abdominal distention, abdominal pain, blood in stool, diarrhea and nausea.   Endocrine: Negative for polydipsia, polyphagia and polyuria.   Genitourinary:  Negative for dysuria, frequency, hematuria and urgency.   Musculoskeletal:  Negative for arthralgias, gait problem and joint swelling.   Skin:  Negative for pallor and rash.   Neurological:  Negative for dizziness, syncope, speech difficulty, weakness, light-headedness, numbness and headaches.   Hematological:  Negative for adenopathy.   Psychiatric/Behavioral:  Negative for behavioral problems, confusion and sleep disturbance. The patient is not nervous/anxious.        Current Outpatient Medications on File Prior to Visit   Medication Sig   • albuterol (2.5 mg/3 mL) 0.083 % nebulizer solution INHALE THE CONTENTS OF 1 VIAL VIA NEBULIZER EVERY 6 HOURS AS NEEDED FOR WHEEZING OR SHORTNESS OF BREATH   • albuterol (PROVENTIL HFA,VENTOLIN HFA) 90 mcg/act inhaler Inhale 2 puffs every 4 (four) hours as needed for wheezing   • amLODIPine (NORVASC) 10 mg tablet TAKE 1 TABLET BY MOUTH EVERY DAY   • Cholecalciferol (VITAMIN D-3) 5000 units TABS Take 1 tablet by mouth daily   • Continuous Blood Gluc Sensor MISC Use Freestyle [place] 3 Sensor   • Farxiga 10 MG tablet TAKE 1 TABLET BY MOUTH EVERY DAY   • glipiZIDE (GLUCOTROL XL) 10 mg 24 hr tablet TAKE 1 TABLET DAILY   • Mometasone Furoate (Asmanex HFA) 100 MCG/ACT AERO Inhale 1 puff (100 mcg total) 2 (two) times a day (Patient taking differently: Inhale 1 puff if needed)   •  "Mounjaro 15 MG/0.5ML Inject 15 mg under the skin every 7 days   • pantoprazole (PROTONIX) 40 mg tablet Take 1 tablet by mouth daily   • pioglitazone (ACTOS) 30 mg tablet Take 30 mg by mouth daily   • rosuvastatin (CRESTOR) 5 mg tablet TAKE 1 TABLET DAILY   • [DISCONTINUED] chlorhexidine (PERIDEX) 0.12 % solution  (Patient not taking: Reported on 3/27/2024)   • [DISCONTINUED] cyclobenzaprine (FLEXERIL) 10 mg tablet Take 1 tablet (10 mg total) by mouth 3 (three) times a day as needed for muscle spasms BRAND ONLY PLEASE (Patient not taking: Reported on 3/27/2024)   • [DISCONTINUED] Mounjaro 10 MG/0.5ML Inject 10 mg under the skin every 7 days (Patient not taking: Reported on 3/27/2024)   • [DISCONTINUED] oxyCODONE-acetaminophen (PERCOCET) 7.5-325 MG per tablet  (Patient not taking: Reported on 3/27/2024)   • [DISCONTINUED] pantoprazole (PROTONIX) 20 mg tablet Take 20 mg by mouth daily (Patient not taking: Reported on 3/27/2024)   • [DISCONTINUED] Sod Fluoride-Potassium Nitrate (PreviDent 5000 Enamel Protect) 1.1-5 % GEL  (Patient not taking: Reported on 3/27/2024)       Objective     /88 (BP Location: Left arm, Patient Position: Sitting, Cuff Size: Large)   Pulse 94   Temp 97.5 °F (36.4 °C) (Temporal)   Resp 14   Ht 5' 6.5\" (1.689 m)   Wt 98.4 kg (217 lb)   SpO2 97%   BMI 34.50 kg/m²     Physical Exam  Vitals reviewed.   Constitutional:       General: He is not in acute distress.     Appearance: Normal appearance. He is well-developed. He is obese. He is not ill-appearing.   HENT:      Head: Normocephalic and atraumatic.   Eyes:      General:         Right eye: No discharge.         Left eye: No discharge.      Conjunctiva/sclera: Conjunctivae normal.      Pupils: Pupils are equal, round, and reactive to light.   Neck:      Thyroid: No thyromegaly.      Vascular: No JVD.   Cardiovascular:      Rate and Rhythm: Normal rate and regular rhythm.      Pulses: no weak pulses.           Dorsalis pedis pulses are " 2+ on the right side and 2+ on the left side.        Posterior tibial pulses are 2+ on the right side and 2+ on the left side.      Heart sounds: Normal heart sounds. No murmur heard.  Pulmonary:      Effort: Pulmonary effort is normal.      Breath sounds: Normal breath sounds. No wheezing or rales.   Abdominal:      General: Bowel sounds are normal.      Palpations: Abdomen is soft. There is no mass.      Tenderness: There is no abdominal tenderness. There is no guarding or rebound.   Musculoskeletal:         General: No tenderness or deformity. Normal range of motion.      Cervical back: Neck supple.   Feet:      Right foot:      Skin integrity: No ulcer, skin breakdown, erythema, warmth, callus or dry skin.      Left foot:      Skin integrity: No ulcer, skin breakdown, erythema, warmth, callus or dry skin.   Lymphadenopathy:      Cervical: No cervical adenopathy.   Skin:     General: Skin is warm and dry.      Findings: No erythema or rash.   Neurological:      General: No focal deficit present.      Mental Status: He is alert and oriented to person, place, and time.   Psychiatric:         Mood and Affect: Mood normal.         Behavior: Behavior normal.         Thought Content: Thought content normal.         Judgment: Judgment normal.       Diabetic Foot Exam    Patient's shoes and socks removed.    Right Foot/Ankle   Right Foot Inspection  Skin Exam: skin normal and skin intact. No dry skin, no warmth, no callus, no erythema, no maceration, no abnormal color, no pre-ulcer, no ulcer and no callus.     Toe Exam: ROM and strength within normal limits.     Sensory   Monofilament testing: intact    Vascular  The right DP pulse is 2+. The right PT pulse is 2+.     Left Foot/Ankle  Left Foot Inspection  Skin Exam: skin normal and skin intact. No dry skin, no warmth, no erythema, no maceration, normal color, no pre-ulcer, no ulcer and no callus.     Toe Exam: ROM and strength within normal limits.     Sensory    Monofilament testing: intact    Vascular  The left DP pulse is 2+. The left PT pulse is 2+.     Assign Risk Category  No deformity present  No loss of protective sensation  No weak pulses  Risk: 0      Galo Paredes MD

## 2024-03-27 NOTE — PATIENT INSTRUCTIONS
CONTINUE CURRENT TREATMENT PLAN  MONITOR DIETARY SODIUM, CHOL, AND CARBOHYDRATE INTAKE  ENCOURAGE PHYSICAL ACTIVITY  FOOT CARE    RV 3 M, SOONER PRN      Recent Results (from the past 672 hour(s))   Hemoglobin A1c (w/out EAG)    Collection Time: 03/15/24  7:28 AM   Result Value Ref Range    Hemoglobin A1C 10.4 (H) <5.7 % of total Hgb   Basic metabolic panel    Collection Time: 03/15/24  7:28 AM   Result Value Ref Range    Glucose, Random 162 (H) 65 - 99 mg/dL    BUN 14 7 - 25 mg/dL    Creatinine 0.91 0.70 - 1.35 mg/dL    eGFR 96 > OR = 60 mL/min/1.73m2    SL AMB BUN/CREATININE RATIO SEE NOTE: 6 - 22 (calc)    Sodium 139 135 - 146 mmol/L    Potassium 4.5 3.5 - 5.3 mmol/L    Chloride 104 98 - 110 mmol/L    CO2 28 20 - 32 mmol/L    Calcium 9.9 8.6 - 10.3 mg/dL       Type 2 Diabetes Management for Adults   AMBULATORY CARE:   Type 2 diabetes  is a disease that affects how your body uses glucose (sugar). Either your body cannot make enough insulin, or it cannot use the insulin correctly. It is important to keep diabetes controlled to prevent damage to your heart, blood vessels, and other organs. Management will help you feel well and enjoy your daily activities. Your diabetes care team providers can help you make a plan to fit diabetes care into your schedule. Your plan can change over time to fit your needs and your family's needs.       Have someone call your local emergency number (911 in the [place]) if:   You cannot be woken.    You have signs of diabetic ketoacidosis:     confusion, fatigue    vomiting    rapid heartbeat    fruity smelling breath    extreme thirst    dry mouth and skin    You have any of the following signs of a heart attack:      Squeezing, pressure, or pain in your chest    You may  also have any of the following:     Discomfort or pain in your back, neck, jaw, stomach, or arm    Shortness of breath    Nausea or vomiting    Lightheadedness or a sudden cold sweat    You have any of the following signs  of a stroke:      Numbness or drooping on one side of your face     Weakness in an arm or leg    Confusion or difficulty speaking    Dizziness, a severe headache, or vision loss    Call your doctor or diabetes care team provider if:   You have a sore or wound that will not heal.    You have a change in the amount you urinate.    Your blood sugar levels are higher than your target goals.    You often have lower blood sugar levels than your target goals.    Your skin is red, dry, warm, or swollen.    You have trouble coping with diabetes, or you feel anxious or depressed.    You have trouble following any part of your care plan, such as your meal plan.    You have questions or concerns about your condition or care.    What you need to know about high blood sugar levels:  High blood sugar levels may not cause any symptoms. You may feel more thirsty or urinate more often than usual. Over time, high blood sugar levels can damage your nerves, blood vessels, tissues, and organs. The following can increase your blood sugar levels:  Large meals or large amounts of carbohydrates at one time    Less physical activity    Stress    Illness    A lower dose of diabetes medicine or insulin, or a late dose    What you need to know about low blood sugar levels:  Symptoms include feeling shaky, dizzy, irritable, or confused. You can prevent symptoms by keeping your blood sugar levels from going too low.  Treat a low blood sugar level right away:      Drink 4 ounces of juice or have 1 tube of glucose gel.    Check your blood sugar level again 10 to 15 minutes later.    When the level goes back to normal, eat a meal or snack to prevent another decrease.       Keep glucose gel, raisins, or hard candy with you at all times to treat a low blood sugar level.     Your blood sugar level can get too low if you take diabetes medicine or insulin and do not eat enough food.     If you use insulin, check your blood sugar level before you exercise.       If your blood sugar level is below 100 mg/dL, eat 4 crackers or 2 ounces of raisins, or drink 4 ounces of juice.    Check your level every 30 minutes if you exercise longer than 1 hour.    You may need a snack during or after exercise.    What you can do to manage your blood sugar levels:   Check your blood sugar levels as directed and as needed.  Several items are available to use to check your levels. You may need to check by testing a drop of blood in a glucose monitor. You may instead be given a continuous glucose monitoring (CGM) device. The device is worn at all times. The CGM checks your blood sugar level every 5 minutes. It sends results to an electronic device such as a smart phone. A CGM can be used with or without an insulin pump. You and your diabetes care team providers will decide on the best method for you. The goal for blood sugar levels before meals  is between 80 and 130 mg/dL and 2 hours after eating  is lower than 180 mg/dL.            Make healthy food choices.  Work with a dietitian to create a meal plan that works for you and your schedule. A dietitian can help you learn how to eat the right amount of carbohydrates (sugar and starchy foods) during your meals and snacks. Examples of carbohydrates are breads, cereals, rice, pasta, fruit, low-fat dairy, and sweets. Carbohydrates can raise your blood sugar level if you eat too many at one time.         Eat high-fiber foods as directed.  Fiber helps improve blood sugar levels. Fiber also lowers your risk for heart disease and other problems diabetes can cause. Examples of high-fiber foods include vegetables, whole-grain bread, and beans such as landa beans. Your dietitian can tell you how much fiber to have each day.         Get regular physical activity.  Physical activity can help you get to your target blood sugar level goal and manage your weight. Get at least 150 minutes of moderate to vigorous aerobic physical activity each week.  Resistance training, such as lifting weights, should be done 3 times each week. Do not miss more than 2 days of physical activity in a row. Do not sit longer than 30 minutes at a time. Your healthcare provider can help you create an activity plan. The plan can include the best activities for you and can help you build your strength and endurance.            Maintain a healthy weight.  Ask your team what a healthy weight is for you. A healthy weight can help you control diabetes and prevent heart disease. Ask your team to help you create a weight-loss plan, if needed. Even a loss of 3% to 7% of your excess body weight can help make a difference in managing diabetes. Your team will help you set a weight-loss goal, such as 10 to 15 pounds, or 5% of your extra weight. Together you and your team can set manageable weight-loss goals.    Take your diabetes medicine or insulin as directed.  You may need diabetes medicine, insulin, or both to help control your blood sugar levels. Your healthcare provider will teach you how and when to take your diabetes medicine or insulin. You will also be taught about side effects oral diabetes medicine can cause. Insulin may be injected or given through a pump or pen. You and your providers will decide on the best method for you:    An insulin pump  is an implanted device that gives your insulin 24 hours a day. An insulin pump prevents the need for multiple insulin injections in a day.         An insulin pen  is a device prefilled with the right amount of insulin.         You and your family members will be taught how to draw up and give insulin  if this is the best method for you. Your providers will also teach you how to dispose of needles and syringes.    You will learn how much insulin you need  and when to give it. You will be taught when not to give insulin. You will also be taught what to do if your blood sugar level drops too low. This may happen if you take insulin and do not eat  the right amount of carbohydrates.    More ways to manage type 2 diabetes:   Wear medical alert identification.  Wear medical alert jewelry or carry a card that says you have diabetes. Ask your provider where to get these items.         Do not smoke.  Nicotine and other chemicals in cigarettes and cigars can cause lung and blood vessel damage. It also makes it more difficult to manage your diabetes. Ask your provider for information if you currently smoke and need help to quit. Do not use e-cigarettes or smokeless tobacco in place of cigarettes or to help you quit. They still contain nicotine.    Check your feet each day for cuts, scratches, calluses, or other wounds.  Look for redness and swelling, and feel for warmth. Wear shoes that fit well. Check your shoes for rocks or other objects that can hurt your feet. Do not walk barefoot or wear shoes without socks. Wear cotton socks to help keep your feet dry.         Ask about vaccines you may need.  You have a higher risk for serious illness if you get the flu, pneumonia, COVID-19, or hepatitis. Ask your provider if you should get vaccines to prevent these or other diseases, and when to get the vaccines.    Talk to your provider if you become stressed about diabetes care.  Sometimes being able to fit diabetes care into your life can cause increased stress. The stress can cause you not to take care of yourself properly. Your provider can help by offering tips about self-care. A mental health provider can listen and offer help with self-care issues. Other types of counseling can help you make nutrition or physical activity changes.    Have your A1c checked as directed.  Your provider may check your A1c every 3 months, or 2 times each year if your diabetes is controlled. An A1c test shows the average amount of sugar in your blood over the past 2 to 3 months. Your provider will tell you what your A1c level should be.    Have screening tests as directed.  Your provider  may recommend screening for complications of diabetes and other conditions that may develop. Some screenings may begin right away and some may happen within the first 5 years of diagnosis:    Examples of diabetes complications  include kidney problems, high cholesterol, high blood pressure, blood vessel problems, eye problems, and sleep apnea.    You may be screened for a low vitamin B level  if you take oral diabetes medicine for a long time.    You may be screened for polycystic ovarian syndrome (PCOS)  if you are of childbearing age.    Follow up with your doctor or diabetes care team providers as directed:  You may need to have blood tests done before your follow-up visit. The test results will show if changes need to be made in your treatment or self-care. Talk to your provider if you cannot afford your medicine. Write down your questions so you remember to ask them during your visits.  © Copyright [employer] 2023 Information is for End User's use only and may not be sold, redistributed or otherwise used for commercial purposes.  The above information is an  only. It is not intended as medical advice for individual conditions or treatments. Talk to your doctor, nurse or pharmacist before following any medical regimen to see if it is safe and effective for you.

## 2024-03-27 NOTE — ASSESSMENT & PLAN NOTE
WATCHING CHOLESTEROL INTAKE  COMPLIANT WITH MEDICATION  NO CONCERNS    - CONTINUE TO MONITOR DIETARY CHOL INTAKE  - CONTINUE CURRENT MEDICATION  - ENCOURAGED PHYSICAL ACTIVITY     yes

## 2024-03-27 NOTE — LETTER
SLY B      Current Outpatient Medications:     pantoprazole (PROTONIX) 20 mg tablet, Take 20 mg by mouth daily, Disp: , Rfl:     pioglitazone (ACTOS) 30 mg tablet, Take 30 mg by mouth daily, Disp: , Rfl:     albuterol (2.5 mg/3 mL) 0.083 % nebulizer solution, INHALE THE CONTENTS OF 1 VIAL VIA NEBULIZER EVERY 6 HOURS AS NEEDED FOR WHEEZING OR SHORTNESS OF BREATH, Disp: 180 mL, Rfl: 0    albuterol (PROVENTIL HFA,VENTOLIN HFA) 90 mcg/act inhaler, Inhale 2 puffs every 4 (four) hours as needed for wheezing, Disp: 6.7 g, Rfl: 3    amLODIPine (NORVASC) 10 mg tablet, TAKE 1 TABLET BY MOUTH EVERY DAY, Disp: 90 tablet, Rfl: 1    chlorhexidine (PERIDEX) 0.12 % solution, , Disp: , Rfl:     Cholecalciferol (VITAMIN D-3) 5000 units TABS, Take 1 tablet by mouth daily, Disp: , Rfl:     Continuous Blood Gluc Sensor MISC, Use Freestyle [place] 3 Sensor, Disp: , Rfl:     cyclobenzaprine (FLEXERIL) 10 mg tablet, Take 1 tablet (10 mg total) by mouth 3 (three) times a day as needed for muscle spasms BRAND ONLY PLEASE, Disp: 45 tablet, Rfl: 1    Farxiga 10 MG tablet, TAKE 1 TABLET BY MOUTH EVERY DAY, Disp: 90 tablet, Rfl: 3    glipiZIDE (GLUCOTROL XL) 10 mg 24 hr tablet, TAKE 1 TABLET DAILY, Disp: 90 tablet, Rfl: 1    Mometasone Furoate (Asmanex HFA) 100 MCG/ACT AERO, Inhale 1 puff (100 mcg total) 2 (two) times a day (Patient taking differently: Inhale 1 puff if needed), Disp: 13 g, Rfl: 3    Mounjaro 10 MG/0.5ML, Inject 10 mg under the skin every 7 days, Disp: , Rfl:     oxyCODONE-acetaminophen (PERCOCET) 7.5-325 MG per tablet, , Disp: , Rfl:     pantoprazole (PROTONIX) 40 mg tablet, Take 1 tablet by mouth daily, Disp: , Rfl:     rosuvastatin (CRESTOR) 5 mg tablet, TAKE 1 TABLET DAILY, Disp: 90 tablet, Rfl: 1    Sod Fluoride-Potassium Nitrate (PreviDent 5000 Enamel Protect) 1.1-5 % GEL, , Disp: , Rfl:       Recent Results (from the past 672 hour(s))   Hemoglobin A1c (w/out EAG)    Collection Time: 03/15/24  7:28 AM   Result Value Ref  Range    Hemoglobin A1C 10.4 (H) <5.7 % of total Hgb   Basic metabolic panel    Collection Time: 03/15/24  7:28 AM   Result Value Ref Range    Glucose, Random 162 (H) 65 - 99 mg/dL    BUN 14 7 - 25 mg/dL    Creatinine 0.91 0.70 - 1.35 mg/dL    eGFR 96 > OR = 60 mL/min/1.73m2    SL AMB BUN/CREATININE RATIO SEE NOTE: 6 - 22 (calc)    Sodium 139 135 - 146 mmol/L    Potassium 4.5 3.5 - 5.3 mmol/L    Chloride 104 98 - 110 mmol/L    CO2 28 20 - 32 mmol/L    Calcium 9.9 8.6 - 10.3 mg/dL

## 2024-03-27 NOTE — ASSESSMENT & PLAN NOTE
Lab Results   Component Value Date    HGBA1C 10.4 (H) 03/15/2024       COMPLIANT WITH MEDICATION  DENIES ANY NUMBNESS, TINGLING IN FEET    - DISCUSSED DIETARY MODIFICATION OF CARBOHYDRATES  - HGB A1C AND URINE STUDIES DUE TODAY  - FOOT CARE  - RV 3 MONTHS

## 2024-03-28 ENCOUNTER — TELEPHONE (OUTPATIENT)
Dept: ADMINISTRATIVE | Facility: OTHER | Age: 59
End: 2024-03-28

## 2024-03-28 NOTE — TELEPHONE ENCOUNTER
----- Message from Leo Rosario sent at 3/27/2024  3:35 PM EDT -----  Regarding: CARE GAP REQUST - Colonoscopy  03/27/24 3:35 PM    Hello, our patient attached above has had CRC: Colonoscopy completed/performed. Please assist in updating the patient chart by making an External outreach to [facility] (Dr. Le) facility located in [place], [place]. The date of service is 2023.    Thank you,  Leo Rosario, [facility]  [facility]

## 2024-03-28 NOTE — TELEPHONE ENCOUNTER
Upon review of the In Basket request we were able to locate, review, and update the patient chart as requested for CRC: Colonoscopy.    Any additional questions or concerns should be emailed to the Practice Liaisons via the appropriate education email address, please do not reply via In Basket.    Thank you  Missy Perdomo

## 2024-05-07 DIAGNOSIS — E78.2 MIXED HYPERLIPIDEMIA: ICD-10-CM

## 2024-05-07 RX ORDER — ROSUVASTATIN CALCIUM 5 MG/1
5 TABLET, COATED ORAL DAILY
Qty: 90 TABLET | Refills: 1 | Status: SHIPPED | OUTPATIENT
Start: 2024-05-07

## 2024-05-12 DIAGNOSIS — E11.9 TYPE 2 DIABETES MELLITUS WITHOUT COMPLICATION, WITHOUT LONG-TERM CURRENT USE OF INSULIN (HCC): ICD-10-CM

## 2024-05-12 RX ORDER — DAPAGLIFLOZIN 10 MG/1
TABLET, FILM COATED ORAL
Qty: 90 TABLET | Refills: 3 | Status: SHIPPED | OUTPATIENT
Start: 2024-05-12

## 2024-06-05 ENCOUNTER — OFFICE VISIT (OUTPATIENT)
Dept: FAMILY MEDICINE CLINIC | Facility: CLINIC | Age: 59
End: 2024-06-05
Payer: COMMERCIAL

## 2024-06-05 VITALS
TEMPERATURE: 97.3 F | RESPIRATION RATE: 18 BRPM | BODY MASS INDEX: 33.43 KG/M2 | HEART RATE: 85 BPM | OXYGEN SATURATION: 94 % | WEIGHT: 213 LBS | DIASTOLIC BLOOD PRESSURE: 92 MMHG | HEIGHT: 67 IN | SYSTOLIC BLOOD PRESSURE: 128 MMHG

## 2024-06-05 DIAGNOSIS — H00.014 HORDEOLUM EXTERNUM OF LEFT UPPER EYELID: Primary | ICD-10-CM

## 2024-06-05 PROBLEM — M72.2 PLANTAR FIBROMATOSIS: Status: RESOLVED | Noted: 2018-10-24 | Resolved: 2024-06-05

## 2024-06-05 PROCEDURE — 99213 OFFICE O/P EST LOW 20 MIN: CPT | Performed by: FAMILY MEDICINE

## 2024-06-05 RX ORDER — AMOXICILLIN AND CLAVULANATE POTASSIUM 500; 125 MG/1; MG/1
1 TABLET, FILM COATED ORAL EVERY 12 HOURS SCHEDULED
Qty: 10 TABLET | Refills: 0 | Status: SHIPPED | OUTPATIENT
Start: 2024-06-05 | End: 2024-06-15

## 2024-06-05 RX ORDER — LANCETS
EACH MISCELLANEOUS
COMMUNITY

## 2024-06-05 RX ORDER — TOBRAMYCIN AND DEXAMETHASONE 3; 1 MG/ML; MG/ML
2 SUSPENSION/ DROPS OPHTHALMIC 4 TIMES DAILY
Qty: 2.5 ML | Refills: 0 | Status: SHIPPED | OUTPATIENT
Start: 2024-06-05 | End: 2024-06-10

## 2024-06-05 NOTE — PROGRESS NOTES
Ambulatory Visit  Name: Bijan Holland      : 1965      MRN: [id number]  Encounter Provider: Galo Paredes MD  Encounter Date: 2024   Encounter department: [facility]    Assessment & Plan   1. Hordeolum externum of left upper eyelid  -     tobramycin-dexamethasone (TOBRADEX) ophthalmic suspension; Administer 2 drops into the left eye 4 (four) times a day for 5 days  -     amoxicillin-clavulanate (AUGMENTIN) 500-125 mg per tablet; Take 1 tablet by mouth every 12 (twelve) hours for 10 days       History of Present Illness     Eye Problem   The left eye is affected. This is a new problem. The current episode started in the past 7 days. The problem has been gradually worsening. There was no injury mechanism. The pain is mild. There is No known exposure to pink eye. He Does not wear contacts. Associated symptoms include blurred vision, an eye discharge and eye redness. Pertinent negatives include no double vision, fever, foreign body sensation, itching, nausea, photophobia, recent URI, vomiting or weakness. He has tried eye drops for the symptoms. The treatment provided mild relief.       Review of Systems   Constitutional:  Negative for chills, fatigue and fever.   HENT:  Negative for sore throat.    Eyes:  Positive for blurred vision, discharge and redness. Negative for double vision, photophobia and itching.   Respiratory:  Negative for cough and chest tightness.    Cardiovascular:  Negative for chest pain and palpitations.   Gastrointestinal:  Negative for abdominal pain, diarrhea, nausea and vomiting.   Musculoskeletal:  Negative for arthralgias and gait problem.   Neurological:  Negative for dizziness, weakness and headaches.   Hematological:  Negative for adenopathy.   Psychiatric/Behavioral:  The patient is not nervous/anxious.        Objective     /92 (BP Location: Left arm, Patient Position: Sitting, Cuff Size: Large)   Pulse 85   Temp (!) 97.3 °F (36.3  "°C) (Temporal)   Resp 18   Ht 5' 6.5\" (1.689 m)   Wt 96.6 kg (213 lb)   SpO2 94%   BMI 33.86 kg/m²     Physical Exam    L UPPER LID STYE      Administrative Statements     "

## 2024-06-05 NOTE — PATIENT INSTRUCTIONS
WARM COMPRESS TO AREA  TYLENOL / ADVIL FOR DISCOMFORT  MEDICATION AS DIRECTED    IF SYMPTOMS PERSIST - CALL

## 2024-06-14 DIAGNOSIS — I10 ESSENTIAL HYPERTENSION: ICD-10-CM

## 2024-06-14 RX ORDER — AMLODIPINE BESYLATE 10 MG/1
TABLET ORAL
Qty: 90 TABLET | Refills: 1 | Status: SHIPPED | OUTPATIENT
Start: 2024-06-14

## 2024-06-28 ENCOUNTER — RA CDI HCC (OUTPATIENT)
Dept: OTHER | Facility: HOSPITAL | Age: 59
End: 2024-06-28

## 2024-06-28 LAB
ALBUMIN SERPL-MCNC: 4.3 G/DL (ref 3.6–5.1)
ALBUMIN/GLOB SERPL: 1.4 (CALC) (ref 1–2.5)
ALP SERPL-CCNC: 85 U/L (ref 35–144)
ALT SERPL-CCNC: 25 U/L (ref 9–46)
AST SERPL-CCNC: 19 U/L (ref 10–35)
BILIRUB SERPL-MCNC: 0.4 MG/DL (ref 0.2–1.2)
BUN SERPL-MCNC: 10 MG/DL (ref 7–25)
BUN/CREAT SERPL: ABNORMAL (CALC) (ref 6–22)
CALCIUM SERPL-MCNC: 9.3 MG/DL (ref 8.6–10.3)
CHLORIDE SERPL-SCNC: 106 MMOL/L (ref 98–110)
CHOLEST SERPL-MCNC: 130 MG/DL
CHOLEST/HDLC SERPL: 2.5 (CALC)
CO2 SERPL-SCNC: 25 MMOL/L (ref 20–32)
CREAT SERPL-MCNC: 0.95 MG/DL (ref 0.7–1.35)
GFR/BSA.PRED SERPLBLD CYS-BASED-ARV: 92 ML/MIN/1.73M2
GLOBULIN SER CALC-MCNC: 3 G/DL (CALC) (ref 1.9–3.7)
GLUCOSE SERPL-MCNC: 128 MG/DL (ref 65–99)
HBA1C MFR BLD: 8.2 % OF TOTAL HGB
HDLC SERPL-MCNC: 52 MG/DL
LDLC SERPL CALC-MCNC: 59 MG/DL (CALC)
NONHDLC SERPL-MCNC: 78 MG/DL (CALC)
POTASSIUM SERPL-SCNC: 4.1 MMOL/L (ref 3.5–5.3)
PROT SERPL-MCNC: 7.3 G/DL (ref 6.1–8.1)
SODIUM SERPL-SCNC: 140 MMOL/L (ref 135–146)
TRIGL SERPL-MCNC: 103 MG/DL

## 2024-06-28 NOTE — PROGRESS NOTES
HCC coding opportunities          Chart Reviewed number of suggestions sent to Provider: 1   E11.36, H25.813  See 11/17/23 eye exam assessment  Patients Insurance        Commercial Insurance: [employer] Commercial [employer]

## 2024-07-05 ENCOUNTER — OFFICE VISIT (OUTPATIENT)
Dept: FAMILY MEDICINE CLINIC | Facility: CLINIC | Age: 59
End: 2024-07-05
Payer: COMMERCIAL

## 2024-07-05 VITALS
SYSTOLIC BLOOD PRESSURE: 120 MMHG | BODY MASS INDEX: 33.59 KG/M2 | WEIGHT: 214 LBS | OXYGEN SATURATION: 96 % | DIASTOLIC BLOOD PRESSURE: 86 MMHG | TEMPERATURE: 97.7 F | HEIGHT: 67 IN | HEART RATE: 85 BPM | RESPIRATION RATE: 16 BRPM

## 2024-07-05 DIAGNOSIS — E11.9 TYPE 2 DIABETES MELLITUS WITHOUT COMPLICATION, WITHOUT LONG-TERM CURRENT USE OF INSULIN (HCC): Primary | ICD-10-CM

## 2024-07-05 DIAGNOSIS — J45.20 MILD INTERMITTENT ASTHMA WITHOUT COMPLICATION: ICD-10-CM

## 2024-07-05 DIAGNOSIS — I10 ESSENTIAL HYPERTENSION: ICD-10-CM

## 2024-07-05 DIAGNOSIS — E78.2 MIXED HYPERLIPIDEMIA: ICD-10-CM

## 2024-07-05 PROBLEM — E11.65 TYPE 2 DIABETES MELLITUS WITH HYPERGLYCEMIA, WITHOUT LONG-TERM CURRENT USE OF INSULIN (HCC): Status: RESOLVED | Noted: 2024-03-27 | Resolved: 2024-07-05

## 2024-07-05 PROCEDURE — 99214 OFFICE O/P EST MOD 30 MIN: CPT | Performed by: FAMILY MEDICINE

## 2024-07-05 NOTE — PROGRESS NOTES
Ambulatory Visit  Name: Bijan Holland      : 1965      MRN: [id number]  Encounter Provider: Galo Paredes MD  Encounter Date: 2024   Encounter department: [facility]    Assessment & Plan   1. Type 2 diabetes mellitus without complication, without long-term current use of insulin (HCC)  Assessment & Plan:    Lab Results   Component Value Date    HGBA1C 8.2 (H) 2024       COMPLIANT WITH MEDICATION  DENIES ANY NUMBNESS, TINGLING IN FEET    - DISCUSSED DIETARY MODIFICATION OF CARBOHYDRATES  - HGB A1C AND URINE STUDIES DUE TODAY  - FOOT CARE  - RV 3 MONTHS    2. Essential hypertension  Assessment & Plan:  STABLE  DENIES ANY CP, SOB, PALPITATIONS, OR HEADACHE  NOTES NO WATER RETENTION  COMPLIANT WITH MEDICATION  NO CONCERNS    - CONTINUE CURRENT TREATMENT PLAN  - MONITOR DIETARY SODIUM INTAKE  - ENCOURAGE PHYSICAL ACTIVITY  - RV 3 MONTHS    3. Mixed hyperlipidemia  Assessment & Plan:  WATCHING CHOLESTEROL INTAKE  COMPLIANT WITH MEDICATION  NO CONCERNS    - CONTINUE TO MONITOR DIETARY CHOL INTAKE  - CONTINUE CURRENT MEDICATION  - ENCOURAGED PHYSICAL ACTIVITY    4. Mild intermittent asthma without complication  Assessment & Plan:  STABLE           History of Present Illness     PATIENT RETURNS FOR ROUTINE EVALUATION OF PATIENT'S MEDICAL ISSUES    INDIVIDUAL MEDICAL ISSUES WITH THEIR CURRENT STATUS, ASSESSMENT AND PLANS ARE LISTED ABOVE            Review of Systems   Constitutional:  Negative for chills, fatigue and fever.   HENT:  Negative for congestion, ear discharge, ear pain, mouth sores, postnasal drip, sore throat and trouble swallowing.    Eyes:  Negative for pain, discharge and visual disturbance.   Respiratory:  Negative for cough, shortness of breath and wheezing.    Cardiovascular:  Negative for chest pain, palpitations and leg swelling.   Gastrointestinal:  Negative for abdominal distention, abdominal pain, blood in stool, diarrhea and nausea.   Endocrine:  Negative for polydipsia, polyphagia and polyuria.   Genitourinary:  Negative for dysuria, frequency, hematuria and urgency.   Musculoskeletal:  Negative for arthralgias, gait problem and joint swelling.   Skin:  Negative for pallor and rash.   Neurological:  Negative for dizziness, syncope, speech difficulty, weakness, light-headedness, numbness and headaches.   Hematological:  Negative for adenopathy.   Psychiatric/Behavioral:  Negative for behavioral problems, confusion and sleep disturbance. The patient is not nervous/anxious.      Past Medical History:   Diagnosis Date   • Asthma    • Diabetes mellitus (HCC)    • High cholesterol    • Hypertension      Past Surgical History:   Procedure Laterality Date   • EPIDURAL BLOCK INJECTION Right 5/26/2023    Procedure: L4- L5  TRANSFORAMINAL epidural steroid injection ([id number]);  Surgeon: Annette Blackwood MD;  Location: [facility] MAIN OR;  Service: Pain Management      Family History   Problem Relation Age of Onset   • Breast cancer Mother    • No Known Problems Father    • Mental illness Neg Hx    • Substance Abuse Neg Hx      Social History     Tobacco Use   • Smoking status: Former     Current packs/day: 0.00     Average packs/day: 0.3 packs/day for 20.0 years (5.0 ttl pk-yrs)     Types: Cigarettes     Start date: 1989     Quit date: 2009     Years since quitting: 15.5     Passive exposure: Never   • Smokeless tobacco: Never   • Tobacco comments:     Pt smoked about 2-3 cigarettes per day   Vaping Use   • Vaping status: Never Used   Substance and Sexual Activity   • Alcohol use: Yes     Comment: couple times per month   • Drug use: No   • Sexual activity: Not on file     Current Outpatient Medications on File Prior to Visit   Medication Sig   • albuterol (2.5 mg/3 mL) 0.083 % nebulizer solution INHALE THE CONTENTS OF 1 VIAL VIA NEBULIZER EVERY 6 HOURS AS NEEDED FOR WHEEZING OR SHORTNESS OF BREATH   • albuterol (PROVENTIL HFA,VENTOLIN HFA) 90 mcg/act inhaler Inhale 2  "puffs every 4 (four) hours as needed for wheezing   • amLODIPine (NORVASC) 10 mg tablet TAKE 1 TABLET BY MOUTH EVERY DAY   • Cholecalciferol (VITAMIN D-3) 5000 units TABS Take 1 tablet by mouth daily   • Continuous Blood Gluc Sensor MISC Use Freestyle Calderon 3 Sensor   • Farxiga 10 MG tablet TAKE 1 TABLET BY MOUTH EVERY DAY   • glipiZIDE (GLUCOTROL XL) 10 mg 24 hr tablet TAKE 1 TABLET DAILY   • glucose blood test strip    • Microlet Lancets MISC    • Mometasone Furoate (Asmanex HFA) 100 MCG/ACT AERO Inhale 1 puff (100 mcg total) 2 (two) times a day (Patient taking differently: Inhale 1 puff if needed)   • Mounjaro 15 MG/0.5ML Inject 15 mg under the skin every 7 days   • rosuvastatin (CRESTOR) 5 mg tablet TAKE 1 TABLET DAILY   • [DISCONTINUED] pantoprazole (PROTONIX) 40 mg tablet Take 1 tablet by mouth daily (Patient not taking: Reported on 7/5/2024)   • [DISCONTINUED] tobramycin-dexamethasone (TOBRADEX) ophthalmic suspension Administer 2 drops into the left eye 4 (four) times a day for 5 days     No Known Allergies  Immunization History   Administered Date(s) Administered   • COVID-19 MODERNA VACC 0.5 ML IM 01/30/2021, 02/27/2021, 11/02/2021   • Influenza Quadrivalent, 6-35 Months IM 09/27/2017   • Influenza, injectable, quadrivalent, preservative free 0.5 mL 10/23/2023   • Influenza, recombinant, quadrivalent,injectable, preservative free 10/24/2018, 10/11/2019, 09/15/2020, 09/30/2021, 12/15/2022   • Tdap 09/15/2016     Objective     /86 (BP Location: Left arm, Patient Position: Sitting, Cuff Size: Standard)   Pulse 85   Temp 97.7 °F (36.5 °C) (Temporal)   Resp 16   Ht 5' 6.5\" (1.689 m)   Wt 97.1 kg (214 lb)   SpO2 96%   BMI 34.02 kg/m²     Physical Exam  Constitutional:       General: He is not in acute distress.     Appearance: Normal appearance. He is well-developed. He is obese. He is not ill-appearing.   HENT:      Head: Normocephalic and atraumatic.      Right Ear: Tympanic membrane and external " ear normal.      Left Ear: Tympanic membrane and external ear normal.      Nose: Nose normal.      Mouth/Throat:      Mouth: Mucous membranes are moist.   Eyes:      General:         Right eye: No discharge.         Left eye: No discharge.      Conjunctiva/sclera: Conjunctivae normal.      Pupils: Pupils are equal, round, and reactive to light.   Neck:      Thyroid: No thyromegaly.      Vascular: No JVD.   Cardiovascular:      Rate and Rhythm: Normal rate and regular rhythm.      Heart sounds: Normal heart sounds. No murmur heard.  Pulmonary:      Effort: Pulmonary effort is normal.      Breath sounds: Normal breath sounds. No wheezing or rales.   Abdominal:      General: Bowel sounds are normal.      Palpations: Abdomen is soft. There is no mass.      Tenderness: There is no abdominal tenderness. There is no guarding or rebound.   Musculoskeletal:         General: No tenderness or deformity. Normal range of motion.      Cervical back: Neck supple.   Lymphadenopathy:      Cervical: No cervical adenopathy.   Skin:     General: Skin is warm and dry.      Findings: No erythema or rash.   Neurological:      General: No focal deficit present.      Mental Status: He is alert and oriented to person, place, and time.      Cranial Nerves: No cranial nerve deficit.      Sensory: No sensory deficit.      Motor: No weakness or abnormal muscle tone.      Coordination: Coordination normal.      Gait: Gait normal.      Deep Tendon Reflexes: Reflexes are normal and symmetric. Reflexes normal.   Psychiatric:         Mood and Affect: Mood normal.         Behavior: Behavior normal.         Thought Content: Thought content normal.         Judgment: Judgment normal.       Administrative Statements

## 2024-07-05 NOTE — PATIENT INSTRUCTIONS
CONTINUE CURRENT TREATMENT PLAN  MONITOR DIETARY SODIUM, CHOL, AND CARBOHYDRATE INTAKE  ENCOURAGE PHYSICAL ACTIVITY  FOOT CARE    RV 3 M, SOONER PRN      Recent Results (from the past 672 hour(s))   Comprehensive metabolic panel    Collection Time: 06/27/24  8:09 AM   Result Value Ref Range    Glucose, Random 128 (H) 65 - 99 mg/dL    BUN 10 7 - 25 mg/dL    Creatinine 0.95 0.70 - 1.35 mg/dL    eGFR 92 > OR = 60 mL/min/1.73m2    SL AMB BUN/CREATININE RATIO SEE NOTE: 6 - 22 (calc)    Sodium 140 135 - 146 mmol/L    Potassium 4.1 3.5 - 5.3 mmol/L    Chloride 106 98 - 110 mmol/L    CO2 25 20 - 32 mmol/L    Calcium 9.3 8.6 - 10.3 mg/dL    Protein, Total 7.3 6.1 - 8.1 g/dL    Albumin 4.3 3.6 - 5.1 g/dL    Globulin 3.0 1.9 - 3.7 g/dL (calc)    Albumin/Globulin Ratio 1.4 1.0 - 2.5 (calc)    TOTAL BILIRUBIN 0.4 0.2 - 1.2 mg/dL    Alkaline Phosphatase 85 35 - 144 U/L    AST 19 10 - 35 U/L    ALT 25 9 - 46 U/L   Lipid Panel with Direct LDL reflex    Collection Time: 06/27/24  8:09 AM   Result Value Ref Range    Total Cholesterol 130 <200 mg/dL    HDL 52 > OR = 40 mg/dL    Triglycerides 103 <150 mg/dL    LDL Calculated 59 mg/dL (calc)    Chol HDLC Ratio 2.5 <5.0 (calc)    Non-HDL Cholesterol 78 <130 mg/dL (calc)   Hemoglobin A1c (w/out EAG)    Collection Time: 06/27/24  8:13 AM   Result Value Ref Range    Hemoglobin A1C 8.2 (H) <5.7 % of total Hgb

## 2024-07-05 NOTE — ASSESSMENT & PLAN NOTE
Lab Results   Component Value Date    HGBA1C 8.2 (H) 06/27/2024       COMPLIANT WITH MEDICATION  DENIES ANY NUMBNESS, TINGLING IN FEET    - DISCUSSED DIETARY MODIFICATION OF CARBOHYDRATES  - HGB A1C AND URINE STUDIES DUE TODAY  - FOOT CARE  - RV 3 MONTHS

## 2024-07-05 NOTE — LETTER
Current Outpatient Medications:     albuterol (2.5 mg/3 mL) 0.083 % nebulizer solution, INHALE THE CONTENTS OF 1 VIAL VIA NEBULIZER EVERY 6 HOURS AS NEEDED FOR WHEEZING OR SHORTNESS OF BREATH, Disp: 180 mL, Rfl: 0    albuterol (PROVENTIL HFA,VENTOLIN HFA) 90 mcg/act inhaler, Inhale 2 puffs every 4 (four) hours as needed for wheezing, Disp: 6.7 g, Rfl: 3    amLODIPine (NORVASC) 10 mg tablet, TAKE 1 TABLET BY MOUTH EVERY DAY, Disp: 90 tablet, Rfl: 1    Cholecalciferol (VITAMIN D-3) 5000 units TABS, Take 1 tablet by mouth daily, Disp: , Rfl:     Continuous Blood Gluc Sensor MISC, Use Freestyle Calderon 3 Sensor, Disp: , Rfl:     Farxiga 10 MG tablet, TAKE 1 TABLET BY MOUTH EVERY DAY, Disp: 90 tablet, Rfl: 3    glipiZIDE (GLUCOTROL XL) 10 mg 24 hr tablet, TAKE 1 TABLET DAILY, Disp: 90 tablet, Rfl: 1    glucose blood test strip, , Disp: , Rfl:     Microlet Lancets MISC, , Disp: , Rfl:     Mometasone Furoate (Asmanex HFA) 100 MCG/ACT AERO, Inhale 1 puff (100 mcg total) 2 (two) times a day (Patient taking differently: Inhale 1 puff if needed), Disp: 13 g, Rfl: 3    Mounjaro 15 MG/0.5ML, Inject 15 mg under the skin every 7 days, Disp: , Rfl:     rosuvastatin (CRESTOR) 5 mg tablet, TAKE 1 TABLET DAILY, Disp: 90 tablet, Rfl: 1      Recent Results (from the past 672 hour(s))   Comprehensive metabolic panel    Collection Time: 06/27/24  8:09 AM   Result Value Ref Range    Glucose, Random 128 (H) 65 - 99 mg/dL    BUN 10 7 - 25 mg/dL    Creatinine 0.95 0.70 - 1.35 mg/dL    eGFR 92 > OR = 60 mL/min/1.73m2    SL AMB BUN/CREATININE RATIO SEE NOTE: 6 - 22 (calc)    Sodium 140 135 - 146 mmol/L    Potassium 4.1 3.5 - 5.3 mmol/L    Chloride 106 98 - 110 mmol/L    CO2 25 20 - 32 mmol/L    Calcium 9.3 8.6 - 10.3 mg/dL    Protein, Total 7.3 6.1 - 8.1 g/dL    Albumin 4.3 3.6 - 5.1 g/dL    Globulin 3.0 1.9 - 3.7 g/dL (calc)    Albumin/Globulin Ratio 1.4 1.0 - 2.5 (calc)    TOTAL BILIRUBIN 0.4 0.2 - 1.2 mg/dL    Alkaline Phosphatase 85 35  - 144 U/L    AST 19 10 - 35 U/L    ALT 25 9 - 46 U/L   Lipid Panel with Direct LDL reflex    Collection Time: 06/27/24  8:09 AM   Result Value Ref Range    Total Cholesterol 130 <200 mg/dL    HDL 52 > OR = 40 mg/dL    Triglycerides 103 <150 mg/dL    LDL Calculated 59 mg/dL (calc)    Chol HDLC Ratio 2.5 <5.0 (calc)    Non-HDL Cholesterol 78 <130 mg/dL (calc)   Hemoglobin A1c (w/out EAG)    Collection Time: 06/27/24  8:13 AM   Result Value Ref Range    Hemoglobin A1C 8.2 (H) <5.7 % of total Hgb

## 2024-08-03 DIAGNOSIS — E11.9 TYPE 2 DIABETES MELLITUS WITHOUT COMPLICATION, WITHOUT LONG-TERM CURRENT USE OF INSULIN (HCC): ICD-10-CM

## 2024-08-03 RX ORDER — GLIPIZIDE 10 MG/1
10 TABLET, FILM COATED, EXTENDED RELEASE ORAL DAILY
Qty: 90 TABLET | Refills: 1 | Status: SHIPPED | OUTPATIENT
Start: 2024-08-03

## 2024-08-07 DIAGNOSIS — I10 ESSENTIAL HYPERTENSION: ICD-10-CM

## 2024-08-07 DIAGNOSIS — Z12.5 ENCOUNTER FOR PROSTATE CANCER SCREENING: ICD-10-CM

## 2024-08-07 DIAGNOSIS — E11.9 TYPE 2 DIABETES MELLITUS WITHOUT COMPLICATION, WITHOUT LONG-TERM CURRENT USE OF INSULIN (HCC): ICD-10-CM

## 2024-08-07 DIAGNOSIS — Z00.00 ENCOUNTER FOR PHYSICAL EXAMINATION: Primary | ICD-10-CM

## 2024-08-07 DIAGNOSIS — Z13.29 SCREENING FOR THYROID DISORDER: ICD-10-CM

## 2024-08-07 DIAGNOSIS — E78.2 MIXED HYPERLIPIDEMIA: ICD-10-CM

## 2024-10-14 NOTE — TELEPHONE ENCOUNTER
Would like you to call him  He said you changed his meds and now he is having vision issues  I offered him an appt but he refused stating he wanted to talk to you first   He said he will come in if you want to see him after he talks to you    [phone] Thanks Neurology

## 2024-10-25 DIAGNOSIS — E78.2 MIXED HYPERLIPIDEMIA: ICD-10-CM

## 2024-10-25 RX ORDER — ROSUVASTATIN CALCIUM 5 MG/1
5 TABLET, COATED ORAL DAILY
Qty: 90 TABLET | Refills: 1 | Status: SHIPPED | OUTPATIENT
Start: 2024-10-25

## 2024-12-05 DIAGNOSIS — I10 ESSENTIAL HYPERTENSION: ICD-10-CM

## 2024-12-06 RX ORDER — AMLODIPINE BESYLATE 10 MG/1
10 TABLET ORAL DAILY
Qty: 90 TABLET | Refills: 1 | Status: SHIPPED | OUTPATIENT
Start: 2024-12-06

## 2025-01-10 ENCOUNTER — TELEPHONE (OUTPATIENT)
Dept: FAMILY MEDICINE CLINIC | Facility: CLINIC | Age: 60
End: 2025-01-10

## 2025-01-10 DIAGNOSIS — Z13.29 SCREENING FOR THYROID DISORDER: ICD-10-CM

## 2025-01-10 DIAGNOSIS — E11.9 TYPE 2 DIABETES MELLITUS WITHOUT COMPLICATION, WITHOUT LONG-TERM CURRENT USE OF INSULIN (HCC): ICD-10-CM

## 2025-01-10 DIAGNOSIS — K21.00 GASTROESOPHAGEAL REFLUX DISEASE WITH ESOPHAGITIS, UNSPECIFIED WHETHER HEMORRHAGE: ICD-10-CM

## 2025-01-10 DIAGNOSIS — Z00.00 ENCOUNTER FOR PHYSICAL EXAMINATION: Primary | ICD-10-CM

## 2025-01-10 DIAGNOSIS — E78.2 MIXED HYPERLIPIDEMIA: ICD-10-CM

## 2025-01-10 DIAGNOSIS — R06.02 SHORTNESS OF BREATH: ICD-10-CM

## 2025-01-10 DIAGNOSIS — Z12.5 ENCOUNTER FOR PROSTATE CANCER SCREENING: ICD-10-CM

## 2025-01-10 RX ORDER — ALBUTEROL SULFATE 0.83 MG/ML
2.5 SOLUTION RESPIRATORY (INHALATION) EVERY 4 HOURS PRN
Qty: 180 ML | Refills: 0 | Status: SHIPPED | OUTPATIENT
Start: 2025-01-10

## 2025-01-10 NOTE — TELEPHONE ENCOUNTER
Patient called to follow up on his Rx, please review and send the script;     [facility] [place] [id number] - [place] - [address]  P: [phone]  F: [phone]    he has an appointment schedule and is aware of the lab work that needs to be done.

## 2025-01-10 NOTE — TELEPHONE ENCOUNTER
.HCMEDREFILL    albuterol (2.5 mg/3 mL) 0.083 % nebulizer solution   Inhale contests into nebulizer  180 ml  Has no refills  [place] pozo

## 2025-01-25 DIAGNOSIS — E11.9 TYPE 2 DIABETES MELLITUS WITHOUT COMPLICATION, WITHOUT LONG-TERM CURRENT USE OF INSULIN (HCC): ICD-10-CM

## 2025-01-27 RX ORDER — PIOGLITAZONE 30 MG/1
TABLET ORAL
COMMUNITY

## 2025-01-27 RX ORDER — PANTOPRAZOLE SODIUM 40 MG/1
1 TABLET, DELAYED RELEASE ORAL DAILY
COMMUNITY
Start: 2024-11-12

## 2025-01-27 RX ORDER — GLIPIZIDE 10 MG/1
10 TABLET, FILM COATED, EXTENDED RELEASE ORAL DAILY
Qty: 90 TABLET | Refills: 1 | Status: SHIPPED | OUTPATIENT
Start: 2025-01-27

## 2025-01-27 NOTE — PATIENT INSTRUCTIONS
DISCUSSED HEALTH MAINTENENCE ISSUES  BW WILL BE REVIEWED  ENCOURAGED HEALTHY DIET AND EXERCISE  COLONOSCOPY WILL BE ORDERED  RV FOR ANNUAL HEALTH EXAM IN 1 YEAR  RV SOONER IF THERE ARE ANY CONCERNS      RV 3M    No results found for this or any previous visit (from the past 4 weeks).

## 2025-01-28 ENCOUNTER — TELEPHONE (OUTPATIENT)
Dept: ADMINISTRATIVE | Facility: OTHER | Age: 60
End: 2025-01-28

## 2025-01-28 ENCOUNTER — OFFICE VISIT (OUTPATIENT)
Dept: FAMILY MEDICINE CLINIC | Facility: CLINIC | Age: 60
End: 2025-01-28
Payer: COMMERCIAL

## 2025-01-28 VITALS
TEMPERATURE: 98.1 F | HEIGHT: 67 IN | WEIGHT: 223 LBS | SYSTOLIC BLOOD PRESSURE: 136 MMHG | OXYGEN SATURATION: 97 % | HEART RATE: 93 BPM | DIASTOLIC BLOOD PRESSURE: 92 MMHG | RESPIRATION RATE: 18 BRPM | BODY MASS INDEX: 35 KG/M2

## 2025-01-28 DIAGNOSIS — I10 ESSENTIAL HYPERTENSION: ICD-10-CM

## 2025-01-28 DIAGNOSIS — Z12.5 ENCOUNTER FOR PROSTATE CANCER SCREENING: ICD-10-CM

## 2025-01-28 DIAGNOSIS — Z13.29 SCREENING FOR HYPOTHYROIDISM: ICD-10-CM

## 2025-01-28 DIAGNOSIS — E11.9 TYPE 2 DIABETES MELLITUS WITHOUT COMPLICATION, WITHOUT LONG-TERM CURRENT USE OF INSULIN (HCC): ICD-10-CM

## 2025-01-28 DIAGNOSIS — E78.2 MIXED HYPERLIPIDEMIA: ICD-10-CM

## 2025-01-28 DIAGNOSIS — Z00.00 ROUTINE GENERAL MEDICAL EXAMINATION AT A HEALTH CARE FACILITY: Primary | ICD-10-CM

## 2025-01-28 DIAGNOSIS — K21.00 GASTROESOPHAGEAL REFLUX DISEASE WITH ESOPHAGITIS, UNSPECIFIED WHETHER HEMORRHAGE: ICD-10-CM

## 2025-01-28 DIAGNOSIS — J45.20 MILD INTERMITTENT ASTHMA WITHOUT COMPLICATION: ICD-10-CM

## 2025-01-28 DIAGNOSIS — E11.36 TYPE 2 DIABETES MELLITUS WITH CATARACT (HCC): ICD-10-CM

## 2025-01-28 DIAGNOSIS — Z12.11 COLON CANCER SCREENING: ICD-10-CM

## 2025-01-28 LAB
ALBUMIN SERPL-MCNC: 4.3 G/DL (ref 3.6–5.1)
ALBUMIN/CREAT UR: 17 MG/G CREAT
ALBUMIN/GLOB SERPL: 1.4 (CALC) (ref 1–2.5)
ALP SERPL-CCNC: 86 U/L (ref 35–144)
ALT SERPL-CCNC: 30 U/L (ref 9–46)
AST SERPL-CCNC: 22 U/L (ref 10–35)
BASOPHILS # BLD AUTO: 29 CELLS/UL (ref 0–200)
BASOPHILS NFR BLD AUTO: 0.7 %
BILIRUB SERPL-MCNC: 0.5 MG/DL (ref 0.2–1.2)
BUN SERPL-MCNC: 15 MG/DL (ref 7–25)
BUN/CREAT SERPL: ABNORMAL (CALC) (ref 6–22)
CALCIUM SERPL-MCNC: 9.4 MG/DL (ref 8.6–10.3)
CHLORIDE SERPL-SCNC: 105 MMOL/L (ref 98–110)
CHOLEST SERPL-MCNC: 160 MG/DL
CHOLEST/HDLC SERPL: 2.9 (CALC)
CO2 SERPL-SCNC: 27 MMOL/L (ref 20–32)
CREAT SERPL-MCNC: 0.96 MG/DL (ref 0.7–1.35)
CREAT UR-MCNC: 92 MG/DL (ref 20–320)
EOSINOPHIL # BLD AUTO: 71 CELLS/UL (ref 15–500)
EOSINOPHIL NFR BLD AUTO: 1.7 %
ERYTHROCYTE [DISTWIDTH] IN BLOOD BY AUTOMATED COUNT: 14.3 % (ref 11–15)
GFR/BSA.PRED SERPLBLD CYS-BASED-ARV: 90 ML/MIN/1.73M2
GLOBULIN SER CALC-MCNC: 3 G/DL (CALC) (ref 1.9–3.7)
GLUCOSE SERPL-MCNC: 159 MG/DL (ref 65–99)
HBA1C MFR BLD: 9.5 % OF TOTAL HGB
HCT VFR BLD AUTO: 53.3 % (ref 38.5–50)
HDLC SERPL-MCNC: 56 MG/DL
HGB BLD-MCNC: 17.5 G/DL (ref 13.2–17.1)
LDLC SERPL CALC-MCNC: 78 MG/DL (CALC)
LYMPHOCYTES # BLD AUTO: 1520 CELLS/UL (ref 850–3900)
LYMPHOCYTES NFR BLD AUTO: 36.2 %
MCH RBC QN AUTO: 26.9 PG (ref 27–33)
MCHC RBC AUTO-ENTMCNC: 32.8 G/DL (ref 32–36)
MCV RBC AUTO: 81.9 FL (ref 80–100)
MICROALBUMIN UR-MCNC: 1.6 MG/DL
MONOCYTES # BLD AUTO: 349 CELLS/UL (ref 200–950)
MONOCYTES NFR BLD AUTO: 8.3 %
NEUTROPHILS # BLD AUTO: 2230 CELLS/UL (ref 1500–7800)
NEUTROPHILS NFR BLD AUTO: 53.1 %
NONHDLC SERPL-MCNC: 104 MG/DL (CALC)
PLATELET # BLD AUTO: 326 THOUSAND/UL (ref 140–400)
PMV BLD REES-ECKER: 8.9 FL (ref 7.5–12.5)
POTASSIUM SERPL-SCNC: 4.3 MMOL/L (ref 3.5–5.3)
PROT SERPL-MCNC: 7.3 G/DL (ref 6.1–8.1)
PSA SERPL-MCNC: 1.54 NG/ML
RBC # BLD AUTO: 6.51 MILLION/UL (ref 4.2–5.8)
SL AMB POCT FECES OCC BLD: NORMAL
SODIUM SERPL-SCNC: 139 MMOL/L (ref 135–146)
TRIGL SERPL-MCNC: 161 MG/DL
TSH SERPL-ACNC: 1.63 MIU/L (ref 0.4–4.5)
WBC # BLD AUTO: 4.2 THOUSAND/UL (ref 3.8–10.8)

## 2025-01-28 PROCEDURE — 99396 PREV VISIT EST AGE 40-64: CPT | Performed by: FAMILY MEDICINE

## 2025-01-28 PROCEDURE — 82270 OCCULT BLOOD FECES: CPT | Performed by: FAMILY MEDICINE

## 2025-01-28 PROCEDURE — 93000 ELECTROCARDIOGRAM COMPLETE: CPT | Performed by: FAMILY MEDICINE

## 2025-01-28 NOTE — PROGRESS NOTES
Depression Screening and Follow-up Plan: Patient was screened for depression during today's encounter. They screened negative with a PHQ-2 score of 0.    FAMILY PRACTICE HEALTH MAINTENANCE OFFICE VISIT  [facility] - [facility]    NAME: Bijan Holland  AGE: 60 y.o. SEX: male  : 1965     DATE: 2025    Assessment and Plan     Problem List Items Addressed This Visit        Cardiovascular and Mediastinum    Essential hypertension    Relevant Orders    POCT ECG (Completed)    Comprehensive metabolic panel       Respiratory    Mild intermittent asthma without complication       Digestive    Gastro-esophageal reflux disease with esophagitis    Relevant Medications    pantoprazole (PROTONIX) 40 mg tablet       Endocrine    Type 2 diabetes mellitus without complication, without long-term current use of insulin (HCC)    Relevant Medications    pioglitazone (ACTOS) 30 mg tablet    Other Relevant Orders    Comprehensive metabolic panel    Hemoglobin A1C    Lipid Panel with Direct LDL reflex    Type 2 diabetes mellitus with cataract (HCC)    Relevant Medications    pioglitazone (ACTOS) 30 mg tablet       Other    Mixed hyperlipidemia    Relevant Orders    Lipid Panel with Direct LDL reflex   Other Visit Diagnoses       Routine general medical examination at a health care facility    -  Primary      Screening for hypothyroidism          Encounter for prostate cancer screening          Colon cancer screening        Relevant Orders    POCT hemoccult screening (Completed)               Return in about 6 months (around 2025) for Recheck.        Chief Complaint     Chief Complaint   Patient presents with   • Physical Exam     Annual physical exam with diabetic foot exam RH       History of Present Illness     DISCUSSED HEALTH ISSUES  REVIEWED MEDICAL RECORD  NO CONCERNS AT THIS TIME            Well Adult Physical   Patient here for a comprehensive physical exam.      Diet  and Physical Activity  Diet: well balanced diet  Weight concerns: Patient has class 2 obesity (BMI 35.0-39.9)  Exercise: infrequently      Depression Screen  PHQ-2/9 Depression Screening    Little interest or pleasure in doing things: 0 - not at all  Feeling down, depressed, or hopeless: 0 - not at all  PHQ-2 Score: 0  PHQ-2 Interpretation: Negative depression screen          General Health  Hearing: Normal:  bilateral  Vision: no vision problems and wears glasses  Dental: regular dental visits    Reproductive Health          The following portions of the patient's history were reviewed and updated as appropriate: allergies, current medications, past family history, past medical history, past social history, past surgical history and problem list.    Review of Systems     Review of Systems   Constitutional:  Negative for chills, fatigue and fever.   HENT:  Negative for congestion, ear discharge, ear pain, mouth sores, postnasal drip, sore throat and trouble swallowing.    Eyes:  Negative for pain, discharge and visual disturbance.   Respiratory:  Negative for cough, shortness of breath and wheezing.    Cardiovascular:  Negative for chest pain, palpitations and leg swelling.   Gastrointestinal:  Negative for abdominal distention, abdominal pain, blood in stool, diarrhea and nausea.   Endocrine: Negative for polydipsia, polyphagia and polyuria.   Genitourinary:  Negative for dysuria, frequency, hematuria and urgency.   Musculoskeletal:  Negative for arthralgias, gait problem and joint swelling.   Skin:  Negative for pallor and rash.   Neurological:  Negative for dizziness, syncope, speech difficulty, weakness, light-headedness, numbness and headaches.   Hematological:  Negative for adenopathy.   Psychiatric/Behavioral:  Negative for behavioral problems, confusion and sleep disturbance. The patient is not nervous/anxious.        Past Medical History     Past Medical History:   Diagnosis Date   • Asthma    • Diabetes  mellitus (HCC)    • High cholesterol    • Hypertension        Past Surgical History     Past Surgical History:   Procedure Laterality Date   • EPIDURAL BLOCK INJECTION Right 2023    Procedure: L4- L5  TRANSFORAMINAL epidural steroid injection ([id number]);  Surgeon: Annette Blackwood MD;  Location: [facility] MAIN OR;  Service: Pain Management        Social History     Social History     Socioeconomic History   • Marital status: /Civil Union     Spouse name: None   • Number of children: None   • Years of education: None   • Highest education level: None   Occupational History   • None   Tobacco Use   • Smoking status: Former     Current packs/day: 0.00     Average packs/day: 0.3 packs/day for 20.0 years (5.0 ttl pk-yrs)     Types: Cigarettes     Start date:      Quit date:      Years since quittin.0     Passive exposure: Never   • Smokeless tobacco: Never   • Tobacco comments:     Pt smoked about 2-3 cigarettes per day   Vaping Use   • Vaping status: Never Used   Substance and Sexual Activity   • Alcohol use: Yes     Comment: couple times per month   • Drug use: No   • Sexual activity: None   Other Topics Concern   • None   Social History Narrative   • None     Social Drivers of Health     Financial Resource Strain: Not on file   Food Insecurity: Not on file   Transportation Needs: Not on file   Physical Activity: Not on file   Stress: Not on file   Social Connections: Not on file   Intimate Partner Violence: Not on file   Housing Stability: Not on file       Family History     Family History   Problem Relation Age of Onset   • Breast cancer Mother    • No Known Problems Father    • Mental illness Neg Hx    • Substance Abuse Neg Hx        Current Medications       Current Outpatient Medications:   •  albuterol (2.5 mg/3 mL) 0.083 % nebulizer solution, Take 3 mL (2.5 mg total) by nebulization every 4 (four) hours as needed for wheezing or shortness of breath, Disp: 180 mL, Rfl: 0  •  albuterol  "(PROVENTIL HFA,VENTOLIN HFA) 90 mcg/act inhaler, Inhale 2 puffs every 4 (four) hours as needed for wheezing, Disp: 6.7 g, Rfl: 3  •  amLODIPine (NORVASC) 10 mg tablet, TAKE 1 TABLET BY MOUTH EVERY DAY, Disp: 90 tablet, Rfl: 1  •  Cholecalciferol (VITAMIN D-3) 5000 units TABS, Take 1 tablet by mouth daily, Disp: , Rfl:   •  Continuous Blood Gluc Sensor MISC, Use Freestyle Calderon 3 Sensor, Disp: , Rfl:   •  Farxiga 10 MG tablet, TAKE 1 TABLET BY MOUTH EVERY DAY, Disp: 90 tablet, Rfl: 3  •  glipiZIDE (GLUCOTROL XL) 10 mg 24 hr tablet, TAKE 1 TABLET DAILY, Disp: 90 tablet, Rfl: 1  •  glucose blood test strip, , Disp: , Rfl:   •  Microlet Lancets MISC, , Disp: , Rfl:   •  Mometasone Furoate (Asmanex HFA) 100 MCG/ACT AERO, Inhale 1 puff (100 mcg total) 2 (two) times a day, Disp: 13 g, Rfl: 3  •  Mounjaro 15 MG/0.5ML, Inject 15 mg under the skin every 7 days, Disp: , Rfl:   •  pantoprazole (PROTONIX) 40 mg tablet, Take 1 tablet by mouth in the morning, Disp: , Rfl:   •  pioglitazone (ACTOS) 30 mg tablet, , Disp: , Rfl:   •  rosuvastatin (CRESTOR) 5 mg tablet, TAKE 1 TABLET DAILY, Disp: 90 tablet, Rfl: 1     Allergies     No Known Allergies    Objective     /92 (BP Location: Left arm, Patient Position: Sitting, Cuff Size: Large)   Pulse 93   Temp 98.1 °F (36.7 °C) (Temporal)   Resp 18   Ht 5' 6.5\" (1.689 m)   Wt 101 kg (223 lb)   SpO2 97%   BMI 35.45 kg/m²      Physical Exam  Constitutional:       General: He is not in acute distress.     Appearance: Normal appearance. He is well-developed. He is obese. He is not ill-appearing.   HENT:      Head: Normocephalic and atraumatic.      Right Ear: Tympanic membrane and external ear normal.      Left Ear: Tympanic membrane and external ear normal.      Nose: Nose normal.      Mouth/Throat:      Mouth: Mucous membranes are moist.   Eyes:      General:         Right eye: No discharge.         Left eye: No discharge.      Conjunctiva/sclera: Conjunctivae normal.      " Pupils: Pupils are equal, round, and reactive to light.   Neck:      Thyroid: No thyromegaly.      Vascular: No JVD.   Cardiovascular:      Rate and Rhythm: Normal rate and regular rhythm.      Pulses: no weak pulses.           Dorsalis pedis pulses are 1+ on the right side and 1+ on the left side.        Posterior tibial pulses are 1+ on the right side and 1+ on the left side.      Heart sounds: Normal heart sounds. No murmur heard.  Pulmonary:      Effort: Pulmonary effort is normal.      Breath sounds: Normal breath sounds. No wheezing or rales.   Abdominal:      General: Bowel sounds are normal.      Palpations: Abdomen is soft. There is no mass.      Tenderness: There is no abdominal tenderness. There is no guarding or rebound.   Genitourinary:     Penis: Normal.       Testes: Normal.      Prostate: Normal.      Rectum: Normal. Guaiac result negative.   Musculoskeletal:         General: No tenderness or deformity. Normal range of motion.      Cervical back: Neck supple.   Feet:      Right foot:      Skin integrity: No ulcer, skin breakdown, erythema, warmth, callus or dry skin.      Left foot:      Skin integrity: No ulcer, skin breakdown, erythema, warmth, callus or dry skin.   Lymphadenopathy:      Cervical: No cervical adenopathy.   Skin:     General: Skin is warm and dry.      Findings: No erythema or rash.   Neurological:      General: No focal deficit present.      Mental Status: He is alert and oriented to person, place, and time.      Cranial Nerves: No cranial nerve deficit.      Sensory: No sensory deficit.      Motor: No weakness or abnormal muscle tone.      Coordination: Coordination normal.      Gait: Gait normal.      Deep Tendon Reflexes: Reflexes are normal and symmetric. Reflexes normal.   Psychiatric:         Mood and Affect: Mood normal.         Behavior: Behavior normal.         Thought Content: Thought content normal.         Judgment: Judgment normal.           Diabetic Foot  Exam    Patient's shoes and socks removed.    Right Foot/Ankle   Right Foot Inspection  Skin Exam: skin normal and skin intact. No dry skin, no warmth, no callus, no erythema, no maceration, no abnormal color, no pre-ulcer, no ulcer and no callus.     Toe Exam: ROM and strength within normal limits.     Sensory   Monofilament testing: intact    Vascular  The right DP pulse is 1+. The right PT pulse is 1+.     Left Foot/Ankle  Left Foot Inspection  Skin Exam: skin normal and skin intact. No dry skin, no warmth, no erythema, no maceration, normal color, no pre-ulcer, no ulcer and no callus.     Toe Exam: ROM and strength within normal limits.     Sensory   Monofilament testing: intact    Vascular  The left DP pulse is 1+. The left PT pulse is 1+.     Assign Risk Category  No deformity present  No loss of protective sensation  No weak pulses  Risk: 0      No results found.    Health Maintenance     Health Maintenance   Topic Date Due   • Pneumococcal Vaccine: Pediatrics (0 to 5 Years) and At-Risk Patients (6 to 64 Years) (1 of 2 - PCV) Never done   • Zoster Vaccine (1 of 2) Never done   • Annual Physical  02/08/2023   • PT PLAN OF CARE  03/04/2023   • RSV Vaccine for Pregnant Patients and Patients Age 60+ Years (1 - Risk 60-74 years 1-dose series) Never done   • Influenza Vaccine (1) 09/01/2024   • COVID-19 Vaccine (4 - 2024-25 season) 09/01/2024   • DM Eye Exam  01/17/2025   • HEMOGLOBIN A1C  04/27/2025   • Kidney Health Evaluation: GFR  01/27/2026   • Kidney Health Evaluation: Albumin/Creatinine Ratio  01/27/2026   • Depression Screening  01/28/2026   • Diabetic Foot Exam  01/28/2026   • DTaP,Tdap,and Td Vaccines (2 - Td or Tdap) 09/15/2026   • Colorectal Cancer Screening  09/18/2028   • HIV Screening  Completed   • Hepatitis C Screening  Completed   • Meningococcal B Vaccine  Aged Out   • RSV Vaccine age 0-20 Months  Aged Out   • HIB Vaccine  Aged Out   • IPV Vaccine  Aged Out   • Hepatitis A Vaccine  Aged Out   •  Meningococcal ACWY Vaccine  Aged Out   • HPV Vaccine  Aged Out     Immunization History   Administered Date(s) Administered   • COVID-19 MODERNA VACC 0.5 ML IM 01/30/2021, 02/27/2021, 11/02/2021   • Influenza Quadrivalent, 6-35 Months IM 09/27/2017   • Influenza, injectable, quadrivalent, preservative free 0.5 mL 10/23/2023   • Influenza, recombinant, quadrivalent,injectable, preservative free 10/24/2018, 10/11/2019, 09/15/2020, 09/30/2021, 12/15/2022   • Tdap 09/15/2016       Galo Paredes MD  [facility]

## 2025-01-28 NOTE — LETTER
SLY B      Current Outpatient Medications:     albuterol (2.5 mg/3 mL) 0.083 % nebulizer solution, Take 3 mL (2.5 mg total) by nebulization every 4 (four) hours as needed for wheezing or shortness of breath, Disp: 180 mL, Rfl: 0    albuterol (PROVENTIL HFA,VENTOLIN HFA) 90 mcg/act inhaler, Inhale 2 puffs every 4 (four) hours as needed for wheezing, Disp: 6.7 g, Rfl: 3    amLODIPine (NORVASC) 10 mg tablet, TAKE 1 TABLET BY MOUTH EVERY DAY, Disp: 90 tablet, Rfl: 1    Cholecalciferol (VITAMIN D-3) 5000 units TABS, Take 1 tablet by mouth daily, Disp: , Rfl:     Continuous Blood Gluc Sensor MISC, Use Freestyle Calderon 3 Sensor, Disp: , Rfl:     Farxiga 10 MG tablet, TAKE 1 TABLET BY MOUTH EVERY DAY, Disp: 90 tablet, Rfl: 3    glipiZIDE (GLUCOTROL XL) 10 mg 24 hr tablet, TAKE 1 TABLET DAILY, Disp: 90 tablet, Rfl: 1    glucose blood test strip, , Disp: , Rfl:     Microlet Lancets MISC, , Disp: , Rfl:     Mometasone Furoate (Asmanex HFA) 100 MCG/ACT AERO, Inhale 1 puff (100 mcg total) 2 (two) times a day, Disp: 13 g, Rfl: 3    Mounjaro 15 MG/0.5ML, Inject 15 mg under the skin every 7 days, Disp: , Rfl:     pantoprazole (PROTONIX) 40 mg tablet, Take 1 tablet by mouth in the morning, Disp: , Rfl:     pioglitazone (ACTOS) 30 mg tablet, , Disp: , Rfl:     rosuvastatin (CRESTOR) 5 mg tablet, TAKE 1 TABLET DAILY, Disp: 90 tablet, Rfl: 1      Recent Results (from the past 4 weeks)   CBC and differential    Collection Time: 01/27/25 11:03 AM   Result Value Ref Range    White Blood Cell Count 4.2 3.8 - 10.8 Thousand/uL    Red Blood Cell Count 6.51 (H) 4.20 - 5.80 Million/uL    Hemoglobin 17.5 (H) 13.2 - 17.1 g/dL    HCT 53.3 (H) 38.5 - 50.0 %    MCV 81.9 80.0 - 100.0 fL    MCH 26.9 (L) 27.0 - 33.0 pg    MCHC 32.8 32.0 - 36.0 g/dL    RDW 14.3 11.0 - 15.0 %    Platelet Count 326 140 - 400 Thousand/uL    SL AMB MPV 8.9 7.5 - 12.5 fL    Neutrophils (Absolute) 2,230 1,500 - 7,800 cells/uL    Lymphocytes (Absolute) 1,520 850 - 3,900  cells/uL    Monocytes (Absolute) 349 200 - 950 cells/uL    Eosinophils (Absolute) 71 15 - 500 cells/uL    Basophils ABS 29 0 - 200 cells/uL    Neutrophils 53.1 %    Lymphocytes 36.2 %    Monocytes 8.3 %    Eosinophils 1.7 %    Basophils PCT 0.7 %   Comprehensive metabolic panel    Collection Time: 01/27/25 11:03 AM   Result Value Ref Range    Glucose, Random 159 (H) 65 - 99 mg/dL    BUN 15 7 - 25 mg/dL    Creatinine 0.96 0.70 - 1.35 mg/dL    eGFR 90 > OR = 60 mL/min/1.73m2    SL AMB BUN/CREATININE RATIO SEE NOTE: 6 - 22 (calc)    Sodium 139 135 - 146 mmol/L    Potassium 4.3 3.5 - 5.3 mmol/L    Chloride 105 98 - 110 mmol/L    CO2 27 20 - 32 mmol/L    Calcium 9.4 8.6 - 10.3 mg/dL    Protein, Total 7.3 6.1 - 8.1 g/dL    Albumin 4.3 3.6 - 5.1 g/dL    Globulin 3.0 1.9 - 3.7 g/dL (calc)    Albumin/Globulin Ratio 1.4 1.0 - 2.5 (calc)    TOTAL BILIRUBIN 0.5 0.2 - 1.2 mg/dL    Alkaline Phosphatase 86 35 - 144 U/L    AST 22 10 - 35 U/L    ALT 30 9 - 46 U/L   Lipid panel    Collection Time: 01/27/25 11:03 AM   Result Value Ref Range    Total Cholesterol 160 <200 mg/dL    HDL 56 > OR = 40 mg/dL    Triglycerides 161 (H) <150 mg/dL    LDL Calculated 78 mg/dL (calc)    Chol HDLC Ratio 2.9 <5.0 (calc)    Non-HDL Cholesterol 104 <130 mg/dL (calc)   PSA, Total Screen    Collection Time: 01/27/25 11:03 AM   Result Value Ref Range    Prostate Specific Antigen Total 1.54 < OR = 4.00 ng/mL   TSH, 3rd generation    Collection Time: 01/27/25 11:03 AM   Result Value Ref Range    TSH 1.63 0.40 - 4.50 mIU/L   Microalbumin, Random Urine (W/Creatinine)    Collection Time: 01/27/25 11:03 AM   Result Value Ref Range    Creatinine, Urine 92 20 - 320 mg/dL    Albumin,U,Random 1.6 See Note: mg/dL    Microalb/Creat Ratio 17 <30 mg/g creat   Hemoglobin A1c (w/out EAG)    Collection Time: 01/27/25 11:03 AM   Result Value Ref Range    Hemoglobin A1C 9.5 (H) <5.7 % of total Hgb

## 2025-01-28 NOTE — TELEPHONE ENCOUNTER
Upon review of the In Basket request we were able to locate, review, and update the patient chart as requested for Diabetic Eye Exam.    Any additional questions or concerns should be emailed to the Practice Liaisons via the appropriate education email address, please do not reply via In Basket.    Thank you  Aubrey Araiza MA   [facility]

## 2025-01-28 NOTE — TELEPHONE ENCOUNTER
----- Message from Rin SMITH sent at 1/28/2025  8:27 AM EST -----  Regarding: CARE GAP REQUEST - [facility] -  01/28/25 8:27 AM    Hello, our patient Bijan Holland has had Diabetic Eye Exam completed/performed. Please assist in updating the patient chart by pulling the document from the Media Tab. The date of service is 01/02/2024.     Thank you,  Rin Monte [facility]

## 2025-02-12 ENCOUNTER — OFFICE VISIT (OUTPATIENT)
Dept: FAMILY MEDICINE CLINIC | Facility: CLINIC | Age: 60
End: 2025-02-12
Payer: COMMERCIAL

## 2025-02-12 ENCOUNTER — NURSE TRIAGE (OUTPATIENT)
Age: 60
End: 2025-02-12

## 2025-02-12 ENCOUNTER — TELEPHONE (OUTPATIENT)
Age: 60
End: 2025-02-12

## 2025-02-12 VITALS
DIASTOLIC BLOOD PRESSURE: 100 MMHG | OXYGEN SATURATION: 97 % | HEIGHT: 67 IN | BODY MASS INDEX: 35.16 KG/M2 | RESPIRATION RATE: 22 BRPM | TEMPERATURE: 97.1 F | SYSTOLIC BLOOD PRESSURE: 132 MMHG | WEIGHT: 224 LBS | HEART RATE: 90 BPM

## 2025-02-12 DIAGNOSIS — R10.10 PAIN OF UPPER ABDOMEN: Primary | ICD-10-CM

## 2025-02-12 DIAGNOSIS — R10.9 ACUTE RIGHT FLANK PAIN: ICD-10-CM

## 2025-02-12 DIAGNOSIS — E11.9 TYPE 2 DIABETES MELLITUS WITHOUT COMPLICATION, WITHOUT LONG-TERM CURRENT USE OF INSULIN (HCC): ICD-10-CM

## 2025-02-12 DIAGNOSIS — I10 ESSENTIAL HYPERTENSION: ICD-10-CM

## 2025-02-12 DIAGNOSIS — J45.20 MILD INTERMITTENT ASTHMA WITHOUT COMPLICATION: ICD-10-CM

## 2025-02-12 LAB
SL AMB  POCT GLUCOSE, UA: >1000
SL AMB LEUKOCYTE ESTERASE,UA: NEGATIVE
SL AMB POCT BILIRUBIN,UA: NEGATIVE
SL AMB POCT BLOOD,UA: NEGATIVE
SL AMB POCT CLARITY,UA: CLEAR
SL AMB POCT COLOR,UA: ABNORMAL
SL AMB POCT KETONES,UA: NEGATIVE
SL AMB POCT NITRITE,UA: NEGATIVE
SL AMB POCT PH,UA: 6
SL AMB POCT SPECIFIC GRAVITY,UA: 1.01
SL AMB POCT URINE PROTEIN: NEGATIVE
SL AMB POCT UROBILINOGEN: NORMAL

## 2025-02-12 PROCEDURE — 99214 OFFICE O/P EST MOD 30 MIN: CPT | Performed by: FAMILY MEDICINE

## 2025-02-12 PROCEDURE — 96372 THER/PROPH/DIAG INJ SC/IM: CPT

## 2025-02-12 PROCEDURE — 81002 URINALYSIS NONAUTO W/O SCOPE: CPT | Performed by: FAMILY MEDICINE

## 2025-02-12 RX ORDER — KETOROLAC TROMETHAMINE 30 MG/ML
60 INJECTION, SOLUTION INTRAMUSCULAR; INTRAVENOUS ONCE
Status: COMPLETED | OUTPATIENT
Start: 2025-02-12 | End: 2025-02-12

## 2025-02-12 RX ORDER — IBUPROFEN 600 MG/1
600 TABLET, FILM COATED ORAL EVERY 6 HOURS PRN
Qty: 48 TABLET | Refills: 0 | Status: SHIPPED | OUTPATIENT
Start: 2025-02-12 | End: 2025-02-12

## 2025-02-12 RX ADMIN — KETOROLAC TROMETHAMINE 60 MG: 30 INJECTION, SOLUTION INTRAMUSCULAR; INTRAVENOUS at 14:47

## 2025-02-12 NOTE — PROGRESS NOTES
Name: Bijan Holland      : 1965      MRN: [id number]  Encounter Provider: Galo Paredes MD  Encounter Date: 2025   Encounter department: [facility]    Assessment & Plan  Pain of upper abdomen    PATIENT STATES ABOUT 3 DAYS AGO HE BEGAN TO EXPERIENCE RUQ ABD PAIN WHICH WAS SHARP  THIS PAIN HAS NOW MIGRATED TO HIS R FLANK  SHARP COMES AND GOES  MANY TIMES ASSOCIATED WITH CERTAIN MOVEMENTS  SL PLEURITIC IN NATURE    DENIES ANY CP, SOB, PALPITATIONS  NO LEG SWELLING OR PAIN  DENIES ANY NVD  NO URINARY FREQ, BURNING OR BLOOD  NO FEVER OR CHILLS  NO MELENA OR HEMATOCHEZIA    Orders:  •  POCT urine dip  •  Urine culture  •  US abdomen complete; Future  •  ketorolac (TORADOL) 60 mg/2 mL IM injection 60 mg  •  CBC and differential  •  Comprehensive metabolic panel  •  Sedimentation rate, automated  •  C-reactive protein    Acute right flank pain    Orders:  •  US abdomen complete; Future  •  ketorolac (TORADOL) 60 mg/2 mL IM injection 60 mg  •  CBC and differential  •  Comprehensive metabolic panel  •  Sedimentation rate, automated  •  C-reactive protein    Type 2 diabetes mellitus without complication, without long-term current use of insulin ([facility])    Lab Results   Component Value Date    HGBA1C 9.5 (H) 2025          Essential hypertension         Mild intermittent asthma without complication              History of Present Illness     HPI  Review of Systems   Constitutional:  Negative for chills, fatigue and fever.   HENT:  Negative for congestion, ear discharge, ear pain, mouth sores, postnasal drip, sore throat and trouble swallowing.    Eyes:  Negative for pain, discharge and visual disturbance.   Respiratory:  Negative for cough, shortness of breath and wheezing.    Cardiovascular:  Negative for chest pain, palpitations and leg swelling.   Gastrointestinal:  Positive for abdominal pain. Negative for abdominal distention, blood in stool, diarrhea and nausea.    Endocrine: Negative for polydipsia, polyphagia and polyuria.   Genitourinary:  Negative for dysuria, frequency, hematuria and urgency.   Musculoskeletal:  Negative for arthralgias, gait problem and joint swelling.   Skin:  Negative for pallor and rash.   Neurological:  Negative for dizziness, syncope, speech difficulty, weakness, light-headedness, numbness and headaches.   Hematological:  Negative for adenopathy.   Psychiatric/Behavioral:  Negative for behavioral problems, confusion and sleep disturbance. The patient is not nervous/anxious.      Past Medical History:   Diagnosis Date   • Asthma    • Diabetes mellitus (HCC)    • High cholesterol    • Hypertension      Past Surgical History:   Procedure Laterality Date   • EPIDURAL BLOCK INJECTION Right 2023    Procedure: L4- L5  TRANSFORAMINAL epidural steroid injection ([id number]);  Surgeon: Annette Blackwood MD;  Location: [facility] MAIN OR;  Service: Pain Management      Family History   Problem Relation Age of Onset   • Breast cancer Mother    • No Known Problems Father    • Mental illness Neg Hx    • Substance Abuse Neg Hx      Social History     Tobacco Use   • Smoking status: Former     Current packs/day: 0.00     Average packs/day: 0.3 packs/day for 20.0 years (5.0 ttl pk-yrs)     Types: Cigarettes     Start date:      Quit date:      Years since quittin.1     Passive exposure: Never   • Smokeless tobacco: Never   • Tobacco comments:     Pt smoked about 2-3 cigarettes per day   Vaping Use   • Vaping status: Never Used   Substance and Sexual Activity   • Alcohol use: Yes     Comment: couple times per month   • Drug use: No   • Sexual activity: Not on file     Current Outpatient Medications on File Prior to Visit   Medication Sig   • albuterol (2.5 mg/3 mL) 0.083 % nebulizer solution Take 3 mL (2.5 mg total) by nebulization every 4 (four) hours as needed for wheezing or shortness of breath   • albuterol (PROVENTIL HFA,VENTOLIN HFA) 90 mcg/act  "inhaler Inhale 2 puffs every 4 (four) hours as needed for wheezing   • amLODIPine (NORVASC) 10 mg tablet TAKE 1 TABLET BY MOUTH EVERY DAY   • Cholecalciferol (VITAMIN D-3) 5000 units TABS Take 1 tablet by mouth daily   • Continuous Blood Gluc Sensor MISC Use Freestyle Calderon 3 Sensor   • Farxiga 10 MG tablet TAKE 1 TABLET BY MOUTH EVERY DAY   • glipiZIDE (GLUCOTROL XL) 10 mg 24 hr tablet TAKE 1 TABLET DAILY   • glucose blood test strip    • Microlet Lancets MISC    • Mometasone Furoate (Asmanex HFA) 100 MCG/ACT AERO Inhale 1 puff (100 mcg total) 2 (two) times a day   • Mounjaro 15 MG/0.5ML Inject 15 mg under the skin every 7 days   • pantoprazole (PROTONIX) 40 mg tablet Take 1 tablet by mouth in the morning   • pioglitazone (ACTOS) 30 mg tablet    • rosuvastatin (CRESTOR) 5 mg tablet TAKE 1 TABLET DAILY     No Known Allergies  Immunization History   Administered Date(s) Administered   • COVID-19 MODERNA VACC 0.5 ML IM 01/30/2021, 02/27/2021, 11/02/2021   • Influenza Quadrivalent, 6-35 Months IM 09/27/2017   • Influenza, injectable, quadrivalent, preservative free 0.5 mL 10/23/2023   • Influenza, recombinant, quadrivalent,injectable, preservative free 10/24/2018, 10/11/2019, 09/15/2020, 09/30/2021, 12/15/2022   • Tdap 09/15/2016     Objective   /100 (BP Location: Left arm, Patient Position: Sitting, Cuff Size: Large)   Pulse 90   Temp (!) 97.1 °F (36.2 °C) (Temporal)   Resp 22   Ht 5' 6.5\" (1.689 m)   Wt 102 kg (224 lb)   SpO2 97%   BMI 35.61 kg/m²     Physical Exam  Vitals reviewed.   Constitutional:       General: He is not in acute distress.     Appearance: Normal appearance. He is well-developed. He is obese. He is not ill-appearing.   HENT:      Head: Normocephalic and atraumatic.   Eyes:      General:         Right eye: No discharge.         Left eye: No discharge.      Conjunctiva/sclera: Conjunctivae normal.      Pupils: Pupils are equal, round, and reactive to light.   Neck:      Thyroid: No " thyromegaly.      Vascular: No JVD.   Cardiovascular:      Rate and Rhythm: Normal rate and regular rhythm.      Heart sounds: Normal heart sounds. No murmur heard.  Pulmonary:      Effort: Pulmonary effort is normal.      Breath sounds: Normal breath sounds. No wheezing or rales.   Abdominal:      General: Bowel sounds are normal.      Palpations: Abdomen is soft. There is no mass.      Tenderness: There is no abdominal tenderness. There is no guarding or rebound.   Musculoskeletal:         General: No tenderness or deformity. Normal range of motion.      Cervical back: Neck supple.   Lymphadenopathy:      Cervical: No cervical adenopathy.   Skin:     General: Skin is warm and dry.      Findings: No erythema or rash.   Neurological:      General: No focal deficit present.      Mental Status: He is alert and oriented to person, place, and time.   Psychiatric:         Mood and Affect: Mood normal.         Behavior: Behavior normal.         Thought Content: Thought content normal.         Judgment: Judgment normal.

## 2025-02-12 NOTE — TELEPHONE ENCOUNTER
I called and spoke to SR and gave them a verbal.    I called [employer] [facility] to cancel the one sent to them and they do not see it yet.  They stated I have to give them 24 hours.    Can you cancel it electronically with [employer]?

## 2025-02-13 ENCOUNTER — TELEPHONE (OUTPATIENT)
Dept: FAMILY MEDICINE CLINIC | Facility: CLINIC | Age: 60
End: 2025-02-13

## 2025-02-13 LAB
ALBUMIN SERPL-MCNC: 4.3 G/DL (ref 3.6–5.1)
ALBUMIN/GLOB SERPL: 1.5 (CALC) (ref 1–2.5)
ALP SERPL-CCNC: 107 U/L (ref 35–144)
ALT SERPL-CCNC: 21 U/L (ref 9–46)
AST SERPL-CCNC: 18 U/L (ref 10–35)
BASOPHILS # BLD AUTO: 28 CELLS/UL (ref 0–200)
BASOPHILS NFR BLD AUTO: 0.6 %
BILIRUB SERPL-MCNC: 0.4 MG/DL (ref 0.2–1.2)
BUN SERPL-MCNC: 14 MG/DL (ref 7–25)
BUN/CREAT SERPL: ABNORMAL (CALC) (ref 6–22)
CALCIUM SERPL-MCNC: 8.8 MG/DL (ref 8.6–10.3)
CHLORIDE SERPL-SCNC: 107 MMOL/L (ref 98–110)
CO2 SERPL-SCNC: 18 MMOL/L (ref 20–32)
CREAT SERPL-MCNC: 0.94 MG/DL (ref 0.7–1.35)
CRP SERPL-MCNC: 3.8 MG/L
EOSINOPHIL # BLD AUTO: 71 CELLS/UL (ref 15–500)
EOSINOPHIL NFR BLD AUTO: 1.5 %
ERYTHROCYTE [DISTWIDTH] IN BLOOD BY AUTOMATED COUNT: 14 % (ref 11–15)
ERYTHROCYTE [SEDIMENTATION RATE] IN BLOOD BY WESTERGREN METHOD: 2 MM/H
GFR/BSA.PRED SERPLBLD CYS-BASED-ARV: 92 ML/MIN/1.73M2
GLOBULIN SER CALC-MCNC: 2.8 G/DL (CALC) (ref 1.9–3.7)
GLUCOSE SERPL-MCNC: 261 MG/DL (ref 65–99)
HCT VFR BLD AUTO: 50.9 % (ref 38.5–50)
HGB BLD-MCNC: 17 G/DL (ref 13.2–17.1)
LYMPHOCYTES # BLD AUTO: 1377 CELLS/UL (ref 850–3900)
LYMPHOCYTES NFR BLD AUTO: 29.3 %
MCH RBC QN AUTO: 27.4 PG (ref 27–33)
MCHC RBC AUTO-ENTMCNC: 33.4 G/DL (ref 32–36)
MCV RBC AUTO: 82.1 FL (ref 80–100)
MONOCYTES # BLD AUTO: 423 CELLS/UL (ref 200–950)
MONOCYTES NFR BLD AUTO: 9 %
NEUTROPHILS # BLD AUTO: 2801 CELLS/UL (ref 1500–7800)
NEUTROPHILS NFR BLD AUTO: 59.6 %
PLATELET # BLD AUTO: 276 THOUSAND/UL (ref 140–400)
PMV BLD REES-ECKER: 9.7 FL (ref 7.5–12.5)
POTASSIUM SERPL-SCNC: 4 MMOL/L (ref 3.5–5.3)
PROT SERPL-MCNC: 7.1 G/DL (ref 6.1–8.1)
RBC # BLD AUTO: 6.2 MILLION/UL (ref 4.2–5.8)
SODIUM SERPL-SCNC: 138 MMOL/L (ref 135–146)
WBC # BLD AUTO: 4.7 THOUSAND/UL (ref 3.8–10.8)

## 2025-02-13 NOTE — TELEPHONE ENCOUNTER
Lm for Bijan to call back.    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  We are still waiting on the u/s results from [facility].      I advised Bijan to call back with how he is feeling.

## 2025-02-13 NOTE — TELEPHONE ENCOUNTER
Mago from [facility] with results    1. Heterogeneous liver with possible mass in the caudate lobe mri liver with/wo contrast is recommended for further evaluation    2. Kidneys are normal in size and location with no hydronephrosis or large stones    Moderate gallbladder sludge with no gallbladder wall thickening.

## 2025-02-14 ENCOUNTER — TELEPHONE (OUTPATIENT)
Dept: FAMILY MEDICINE CLINIC | Facility: CLINIC | Age: 60
End: 2025-02-14

## 2025-02-14 DIAGNOSIS — R16.0 LIVER MASS: Primary | ICD-10-CM

## 2025-02-14 DIAGNOSIS — R10.9 RIGHT FLANK PAIN: ICD-10-CM

## 2025-02-14 NOTE — TELEPHONE ENCOUNTER
Update: Bijan says he keeping up with Dr Paredes's instructions.  Bijan states he's having a little trouble sleeping, but other than that, doing ok.

## 2025-02-14 NOTE — TELEPHONE ENCOUNTER
MRI abd with & without  2.   Needs to schedule ASAP per Dr Paredes due to mass found on u/s  3.   [facility] [address] [facility] [id number]. [phone]  4.   NPI # [id number]  5.   Bijan ph# [phone]

## 2025-02-23 ENCOUNTER — HOSPITAL ENCOUNTER (OUTPATIENT)
Dept: MRI IMAGING | Facility: HOSPITAL | Age: 60
Discharge: HOME | End: 2025-02-23
Payer: COMMERCIAL

## 2025-02-23 DIAGNOSIS — R10.9 RIGHT FLANK PAIN: ICD-10-CM

## 2025-02-23 DIAGNOSIS — R16.0 LIVER MASS: ICD-10-CM

## 2025-02-23 PROCEDURE — A9585 GADOBUTROL INJECTION: HCPCS | Performed by: FAMILY MEDICINE

## 2025-02-23 PROCEDURE — 74183 MRI ABD W/O CNTR FLWD CNTR: CPT

## 2025-02-23 RX ORDER — GADOBUTROL 604.72 MG/ML
9 INJECTION INTRAVENOUS
Status: COMPLETED | OUTPATIENT
Start: 2025-02-23 | End: 2025-02-23

## 2025-02-23 RX ADMIN — GADOBUTROL 9 ML: 604.72 INJECTION INTRAVENOUS at 10:40

## 2025-02-27 ENCOUNTER — TELEPHONE (OUTPATIENT)
Age: 60
End: 2025-02-27

## 2025-02-27 NOTE — TELEPHONE ENCOUNTER
[place] with  radiology called in to follow up as the radiologist is reading MRI results currently. [place] stated the radiologist saw noted patient has had a an US done previously and a mass was found. [place] stated they cannot find any record of this scan and asked if Dr. Paredes could give them a call back at [phone] [address] to discuss.    Please advise, thank you

## 2025-02-27 NOTE — TELEPHONE ENCOUNTER
Pt called in asking for update on MRI results. Advised pt that they are still in process. Pt is asking if the office and call to try to expedite them as he is anxious about the results. Please advise.

## 2025-02-27 NOTE — TELEPHONE ENCOUNTER
Spoke to Jasmyne at  Radiology.  Jasmyne states it was done at [place].  Jasmyne states there is a note in the file that it hasn't been read yet because they are waiting for an outside u/s report.  Dr Paredes, see previous message to you that radiology dept would like you to call them back to discuss.

## 2025-02-28 NOTE — TELEPHONE ENCOUNTER
"Dr Paredes asked for me to try to follow up on getting his MRI read.      The ultrasound is in his chart.  It is under imaging \"External imaging\" and under Media 2/13/25.    I just tried 2 x times for the reading room in [place] and it rang so long it disconnected me.  I will try after lunch time.  "

## 2025-02-28 NOTE — TELEPHONE ENCOUNTER
MRI is resulted.  I messaged Dr. Paredes to let him know.    I did call Bijan to let him know and that  will be looking at it.

## 2025-02-28 NOTE — TELEPHONE ENCOUNTER
Received a message from     Please call Bill.  MRI just shows some benign liver cysts which are common.  No Concerns!    Called Bijan and spoke to him.  Relayed Dr. Paredes's message to him.      Dr. Paredes - can you just confirm this message.

## 2025-02-28 NOTE — TELEPHONE ENCOUNTER
Tried again and no answer after A LOT of rings    Called [place] [facility] to see if they can help with getting through to [place] Reading room.  No one answered and I was transferred to voicemail.  I left a message for help

## 2025-04-25 DIAGNOSIS — E78.2 MIXED HYPERLIPIDEMIA: ICD-10-CM

## 2025-04-25 RX ORDER — ROSUVASTATIN CALCIUM 5 MG/1
5 TABLET, COATED ORAL DAILY
Qty: 90 TABLET | Refills: 1 | Status: SHIPPED | OUTPATIENT
Start: 2025-04-25

## 2025-05-04 DIAGNOSIS — E11.9 TYPE 2 DIABETES MELLITUS WITHOUT COMPLICATION, WITHOUT LONG-TERM CURRENT USE OF INSULIN (HCC): ICD-10-CM

## 2025-05-05 RX ORDER — DAPAGLIFLOZIN 10 MG/1
10 TABLET, FILM COATED ORAL DAILY
Qty: 90 TABLET | Refills: 1 | Status: SHIPPED | OUTPATIENT
Start: 2025-05-05

## 2025-06-07 DIAGNOSIS — I10 ESSENTIAL HYPERTENSION: ICD-10-CM

## 2025-06-08 RX ORDER — AMLODIPINE BESYLATE 10 MG/1
10 TABLET ORAL DAILY
Qty: 90 TABLET | Refills: 1 | Status: SHIPPED | OUTPATIENT
Start: 2025-06-08

## 2025-07-17 DIAGNOSIS — E11.9 TYPE 2 DIABETES MELLITUS WITHOUT COMPLICATION, WITHOUT LONG-TERM CURRENT USE OF INSULIN (HCC): ICD-10-CM

## 2025-07-18 RX ORDER — GLIPIZIDE 10 MG/1
10 TABLET, FILM COATED, EXTENDED RELEASE ORAL DAILY
Qty: 90 TABLET | Refills: 1 | Status: SHIPPED | OUTPATIENT
Start: 2025-07-18

## 2025-07-24 ENCOUNTER — TELEPHONE (OUTPATIENT)
Age: 60
End: 2025-07-24

## 2025-07-24 NOTE — TELEPHONE ENCOUNTER
Patient called in to inquire about lab orders prior to upcoming OV 7/29. RN identified 3 orders; 2 were sent to [facility]. Advised of fasting requirement for lipid panel and CMP. Advised to print HgA1c and take with him to [facility] to have it done. No further action required.

## 2025-07-26 LAB
ALBUMIN SERPL-MCNC: 4.3 G/DL (ref 3.6–5.1)
ALBUMIN/GLOB SERPL: 1.6 (CALC) (ref 1–2.5)
ALP SERPL-CCNC: 72 U/L (ref 35–144)
ALT SERPL-CCNC: 18 U/L (ref 9–46)
AST SERPL-CCNC: 18 U/L (ref 10–35)
BASOPHILS # BLD AUTO: 29 CELLS/UL (ref 0–200)
BASOPHILS NFR BLD AUTO: 0.6 %
BILIRUB SERPL-MCNC: 0.4 MG/DL (ref 0.2–1.2)
BUN SERPL-MCNC: 10 MG/DL (ref 7–25)
BUN/CREAT SERPL: ABNORMAL (CALC) (ref 6–22)
CALCIUM SERPL-MCNC: 9.1 MG/DL (ref 8.6–10.3)
CHLORIDE SERPL-SCNC: 105 MMOL/L (ref 98–110)
CHOLEST SERPL-MCNC: 127 MG/DL
CHOLEST/HDLC SERPL: 2.5 (CALC)
CO2 SERPL-SCNC: 24 MMOL/L (ref 20–32)
CREAT SERPL-MCNC: 0.88 MG/DL (ref 0.7–1.35)
EOSINOPHIL # BLD AUTO: 162 CELLS/UL (ref 15–500)
EOSINOPHIL NFR BLD AUTO: 3.3 %
ERYTHROCYTE [DISTWIDTH] IN BLOOD BY AUTOMATED COUNT: 13.7 % (ref 11–15)
GFR/BSA.PRED SERPLBLD CYS-BASED-ARV: 98 ML/MIN/1.73M2
GLOBULIN SER CALC-MCNC: 2.7 G/DL (CALC) (ref 1.9–3.7)
GLUCOSE SERPL-MCNC: 122 MG/DL (ref 65–99)
HBA1C MFR BLD: 9.9 %
HCT VFR BLD AUTO: 53.5 % (ref 38.5–50)
HDLC SERPL-MCNC: 51 MG/DL
HGB BLD-MCNC: 17.2 G/DL (ref 13.2–17.1)
LDLC SERPL CALC-MCNC: 54 MG/DL (CALC)
LYMPHOCYTES # BLD AUTO: 1740 CELLS/UL (ref 850–3900)
LYMPHOCYTES NFR BLD AUTO: 35.5 %
MCH RBC QN AUTO: 26.7 PG (ref 27–33)
MCHC RBC AUTO-ENTMCNC: 32.1 G/DL (ref 32–36)
MCV RBC AUTO: 83.2 FL (ref 80–100)
MONOCYTES # BLD AUTO: 441 CELLS/UL (ref 200–950)
MONOCYTES NFR BLD AUTO: 9 %
NEUTROPHILS # BLD AUTO: 2528 CELLS/UL (ref 1500–7800)
NEUTROPHILS NFR BLD AUTO: 51.6 %
NONHDLC SERPL-MCNC: 76 MG/DL (CALC)
PLATELET # BLD AUTO: 304 THOUSAND/UL (ref 140–400)
PMV BLD REES-ECKER: 9.2 FL (ref 7.5–12.5)
POTASSIUM SERPL-SCNC: 4.3 MMOL/L (ref 3.5–5.3)
PROT SERPL-MCNC: 7 G/DL (ref 6.1–8.1)
PSA SERPL-MCNC: 1.32 NG/ML
RBC # BLD AUTO: 6.43 MILLION/UL (ref 4.2–5.8)
SODIUM SERPL-SCNC: 139 MMOL/L (ref 135–146)
TRIGL SERPL-MCNC: 140 MG/DL
TSH SERPL-ACNC: 1.9 MIU/L (ref 0.4–4.5)
WBC # BLD AUTO: 4.9 THOUSAND/UL (ref 3.8–10.8)

## 2025-07-29 ENCOUNTER — OFFICE VISIT (OUTPATIENT)
Dept: FAMILY MEDICINE CLINIC | Facility: CLINIC | Age: 60
End: 2025-07-29
Payer: COMMERCIAL

## 2025-07-29 VITALS
OXYGEN SATURATION: 97 % | HEIGHT: 67 IN | BODY MASS INDEX: 34.88 KG/M2 | RESPIRATION RATE: 18 BRPM | WEIGHT: 222.2 LBS | TEMPERATURE: 98.1 F | DIASTOLIC BLOOD PRESSURE: 82 MMHG | SYSTOLIC BLOOD PRESSURE: 132 MMHG | HEART RATE: 83 BPM

## 2025-07-29 DIAGNOSIS — E78.2 MIXED HYPERLIPIDEMIA: ICD-10-CM

## 2025-07-29 DIAGNOSIS — E11.36 TYPE 2 DIABETES MELLITUS WITH CATARACT (HCC): Primary | ICD-10-CM

## 2025-07-29 DIAGNOSIS — J45.20 MILD INTERMITTENT ASTHMA WITHOUT COMPLICATION: ICD-10-CM

## 2025-07-29 DIAGNOSIS — I10 ESSENTIAL HYPERTENSION: ICD-10-CM

## 2025-07-29 PROCEDURE — 99214 OFFICE O/P EST MOD 30 MIN: CPT | Performed by: FAMILY MEDICINE

## (undated) DEVICE — NEEDLE SPINAL 22G X 5IN QUINCKE

## (undated) DEVICE — SMALL NEEDLE COUNTER NEST

## (undated) DEVICE — NEEDLE BLUNT 18 G X 1 1/2 W FILTER

## (undated) DEVICE — GLOVE UNDER SRG SKINSENSE 7.5

## (undated) DEVICE — TRAY EPID CONT PERIFIX 18G X 3.5IN 5ML CLSD TIP DRAPE

## (undated) DEVICE — WIPES BABY PAMPERS SENSITIVE 36/PK

## (undated) DEVICE — TOWEL SET X-RAY

## (undated) DEVICE — PLASTIC ADHESIVE BANDAGE: Brand: CURITY

## (undated) DEVICE — CHLORAPREP APPLICATOR TINTED 10.5ML ONE-STEP

## (undated) DEVICE — SYRINGE 5ML LL

## (undated) DEVICE — SKIN MARKER DUAL TIP WITH RULER CAP, FLEXIBLE RULER AND LABELS: Brand: DEVON

## (undated) DEVICE — RADIOLOGY STERILE LABELS: Brand: CENTURION